# Patient Record
Sex: MALE | Race: WHITE | NOT HISPANIC OR LATINO
[De-identification: names, ages, dates, MRNs, and addresses within clinical notes are randomized per-mention and may not be internally consistent; named-entity substitution may affect disease eponyms.]

---

## 2020-01-01 ENCOUNTER — RESULT REVIEW (OUTPATIENT)
Age: 80
End: 2020-01-01

## 2020-01-01 ENCOUNTER — OUTPATIENT (OUTPATIENT)
Dept: OUTPATIENT SERVICES | Facility: HOSPITAL | Age: 80
LOS: 1 days | End: 2020-01-01
Payer: MEDICARE

## 2020-01-01 ENCOUNTER — TRANSCRIPTION ENCOUNTER (OUTPATIENT)
Age: 80
End: 2020-01-01

## 2020-01-01 ENCOUNTER — APPOINTMENT (OUTPATIENT)
Dept: MRI IMAGING | Facility: HOSPITAL | Age: 80
End: 2020-01-01
Payer: MEDICARE

## 2020-01-01 ENCOUNTER — INPATIENT (INPATIENT)
Facility: HOSPITAL | Age: 80
LOS: 12 days | DRG: 291 | End: 2020-11-06
Attending: INTERNAL MEDICINE | Admitting: INTERNAL MEDICINE
Payer: MEDICARE

## 2020-01-01 ENCOUNTER — APPOINTMENT (OUTPATIENT)
Dept: CT IMAGING | Facility: HOSPITAL | Age: 80
End: 2020-01-01
Payer: MEDICARE

## 2020-01-01 ENCOUNTER — INPATIENT (INPATIENT)
Facility: HOSPITAL | Age: 80
LOS: 4 days | Discharge: HOME | DRG: 186 | End: 2020-09-23
Attending: STUDENT IN AN ORGANIZED HEALTH CARE EDUCATION/TRAINING PROGRAM | Admitting: STUDENT IN AN ORGANIZED HEALTH CARE EDUCATION/TRAINING PROGRAM
Payer: MEDICARE

## 2020-01-01 VITALS
HEART RATE: 72 BPM | OXYGEN SATURATION: 94 % | HEIGHT: 72 IN | SYSTOLIC BLOOD PRESSURE: 135 MMHG | TEMPERATURE: 99 F | RESPIRATION RATE: 18 BRPM | WEIGHT: 139.99 LBS | DIASTOLIC BLOOD PRESSURE: 73 MMHG

## 2020-01-01 VITALS
DIASTOLIC BLOOD PRESSURE: 67 MMHG | RESPIRATION RATE: 20 BRPM | HEART RATE: 79 BPM | OXYGEN SATURATION: 89 % | SYSTOLIC BLOOD PRESSURE: 112 MMHG

## 2020-01-01 VITALS
SYSTOLIC BLOOD PRESSURE: 130 MMHG | DIASTOLIC BLOOD PRESSURE: 66 MMHG | TEMPERATURE: 98 F | OXYGEN SATURATION: 90 % | HEART RATE: 79 BPM

## 2020-01-01 VITALS
SYSTOLIC BLOOD PRESSURE: 96 MMHG | DIASTOLIC BLOOD PRESSURE: 46 MMHG | OXYGEN SATURATION: 57 % | RESPIRATION RATE: 26 BRPM | HEART RATE: 57 BPM

## 2020-01-01 DIAGNOSIS — R04.2 HEMOPTYSIS: ICD-10-CM

## 2020-01-01 DIAGNOSIS — Z00.8 ENCOUNTER FOR OTHER GENERAL EXAMINATION: ICD-10-CM

## 2020-01-01 DIAGNOSIS — R09.02 HYPOXEMIA: ICD-10-CM

## 2020-01-01 LAB
-  AMPICILLIN: SIGNIFICANT CHANGE UP
-  GENTAMICIN SYNERGY: SIGNIFICANT CHANGE UP
-  VANCOMYCIN: SIGNIFICANT CHANGE UP
A1C WITH ESTIMATED AVERAGE GLUCOSE RESULT: 5.4 % — SIGNIFICANT CHANGE UP (ref 4–5.6)
ABO RH CONFIRMATION: SIGNIFICANT CHANGE UP
ALBUMIN FLD-MCNC: 1.2 G/DL — SIGNIFICANT CHANGE UP
ALBUMIN FLD-MCNC: 1.3 G/DL — SIGNIFICANT CHANGE UP
ALBUMIN SERPL ELPH-MCNC: 1.4 G/DL — LOW (ref 3.3–5)
ALBUMIN SERPL ELPH-MCNC: 2 G/DL — LOW (ref 3.3–5)
ALBUMIN SERPL ELPH-MCNC: 2.9 G/DL — LOW (ref 3.3–5)
ALBUMIN SERPL ELPH-MCNC: 2.9 G/DL — LOW (ref 3.3–5)
ALP SERPL-CCNC: 100 U/L — SIGNIFICANT CHANGE UP (ref 40–120)
ALP SERPL-CCNC: 101 U/L — SIGNIFICANT CHANGE UP (ref 40–120)
ALP SERPL-CCNC: 103 U/L — SIGNIFICANT CHANGE UP (ref 40–120)
ALP SERPL-CCNC: 140 U/L — HIGH (ref 40–120)
ALP SERPL-CCNC: 94 U/L — SIGNIFICANT CHANGE UP (ref 40–120)
ALP SERPL-CCNC: 95 U/L — SIGNIFICANT CHANGE UP (ref 40–120)
ALT FLD-CCNC: 10 U/L — SIGNIFICANT CHANGE UP (ref 10–45)
ALT FLD-CCNC: 15 U/L — SIGNIFICANT CHANGE UP (ref 10–45)
ALT FLD-CCNC: 18 U/L — SIGNIFICANT CHANGE UP (ref 10–45)
ALT FLD-CCNC: 20 U/L — SIGNIFICANT CHANGE UP (ref 10–45)
ALT FLD-CCNC: 27 U/L — SIGNIFICANT CHANGE UP (ref 10–45)
ALT FLD-CCNC: 9 U/L — LOW (ref 10–45)
ANION GAP SERPL CALC-SCNC: 1 MMOL/L — LOW (ref 5–17)
ANION GAP SERPL CALC-SCNC: 10 MMOL/L — SIGNIFICANT CHANGE UP (ref 5–17)
ANION GAP SERPL CALC-SCNC: 11 MMOL/L — SIGNIFICANT CHANGE UP (ref 5–17)
ANION GAP SERPL CALC-SCNC: 12 MMOL/L — SIGNIFICANT CHANGE UP (ref 5–17)
ANION GAP SERPL CALC-SCNC: 4 MMOL/L — LOW (ref 5–17)
ANION GAP SERPL CALC-SCNC: 4 MMOL/L — LOW (ref 5–17)
ANION GAP SERPL CALC-SCNC: 5 MMOL/L — SIGNIFICANT CHANGE UP (ref 5–17)
ANION GAP SERPL CALC-SCNC: 5 MMOL/L — SIGNIFICANT CHANGE UP (ref 5–17)
ANION GAP SERPL CALC-SCNC: 6 MMOL/L — SIGNIFICANT CHANGE UP (ref 5–17)
ANION GAP SERPL CALC-SCNC: 7 MMOL/L — SIGNIFICANT CHANGE UP (ref 5–17)
ANION GAP SERPL CALC-SCNC: 8 MMOL/L — SIGNIFICANT CHANGE UP (ref 5–17)
ANION GAP SERPL CALC-SCNC: 9 MMOL/L — SIGNIFICANT CHANGE UP (ref 5–17)
APPEARANCE UR: ABNORMAL
APPEARANCE UR: CLEAR — SIGNIFICANT CHANGE UP
APTT BLD: 39.9 SEC — HIGH (ref 27.5–35.5)
APTT BLD: 48.7 SEC — HIGH (ref 27.5–35.5)
AST SERPL-CCNC: 11 U/L — SIGNIFICANT CHANGE UP (ref 10–40)
AST SERPL-CCNC: 13 U/L — SIGNIFICANT CHANGE UP (ref 10–40)
AST SERPL-CCNC: 24 U/L — SIGNIFICANT CHANGE UP (ref 10–40)
AST SERPL-CCNC: 28 U/L — SIGNIFICANT CHANGE UP (ref 10–40)
B PERT IGG+IGM PNL SER: ABNORMAL
B PERT IGG+IGM PNL SER: CLEAR — SIGNIFICANT CHANGE UP
BACTERIA # UR AUTO: ABNORMAL /HPF
BACTERIA # UR AUTO: ABNORMAL /HPF
BASOPHILS # BLD AUTO: 0.01 K/UL — SIGNIFICANT CHANGE UP (ref 0–0.2)
BASOPHILS # BLD AUTO: 0.02 K/UL — SIGNIFICANT CHANGE UP (ref 0–0.2)
BASOPHILS # BLD AUTO: 0.02 K/UL — SIGNIFICANT CHANGE UP (ref 0–0.2)
BASOPHILS # BLD AUTO: 0.03 K/UL — SIGNIFICANT CHANGE UP (ref 0–0.2)
BASOPHILS # BLD AUTO: 0.05 K/UL — SIGNIFICANT CHANGE UP (ref 0–0.2)
BASOPHILS NFR BLD AUTO: 0.1 % — SIGNIFICANT CHANGE UP (ref 0–2)
BASOPHILS NFR BLD AUTO: 0.2 % — SIGNIFICANT CHANGE UP (ref 0–2)
BASOPHILS NFR BLD AUTO: 0.2 % — SIGNIFICANT CHANGE UP (ref 0–2)
BASOPHILS NFR BLD AUTO: 0.4 % — SIGNIFICANT CHANGE UP (ref 0–2)
BASOPHILS NFR BLD AUTO: 0.5 % — SIGNIFICANT CHANGE UP (ref 0–2)
BILIRUB SERPL-MCNC: 0.2 MG/DL — SIGNIFICANT CHANGE UP (ref 0.2–1.2)
BILIRUB SERPL-MCNC: 0.2 MG/DL — SIGNIFICANT CHANGE UP (ref 0.2–1.2)
BILIRUB SERPL-MCNC: 0.3 MG/DL — SIGNIFICANT CHANGE UP (ref 0.2–1.2)
BILIRUB SERPL-MCNC: 0.4 MG/DL — SIGNIFICANT CHANGE UP (ref 0.2–1.2)
BILIRUB UR-MCNC: NEGATIVE — SIGNIFICANT CHANGE UP
BILIRUB UR-MCNC: NEGATIVE — SIGNIFICANT CHANGE UP
BLD GP AB SCN SERPL QL: SIGNIFICANT CHANGE UP
BLD GP AB SCN SERPL QL: SIGNIFICANT CHANGE UP
BUN SERPL-MCNC: 11 MG/DL — SIGNIFICANT CHANGE UP (ref 7–23)
BUN SERPL-MCNC: 12 MG/DL — SIGNIFICANT CHANGE UP (ref 7–23)
BUN SERPL-MCNC: 13 MG/DL — SIGNIFICANT CHANGE UP (ref 7–23)
BUN SERPL-MCNC: 13 MG/DL — SIGNIFICANT CHANGE UP (ref 7–23)
BUN SERPL-MCNC: 14 MG/DL — SIGNIFICANT CHANGE UP (ref 7–23)
BUN SERPL-MCNC: 14 MG/DL — SIGNIFICANT CHANGE UP (ref 7–23)
BUN SERPL-MCNC: 15 MG/DL — SIGNIFICANT CHANGE UP (ref 7–23)
BUN SERPL-MCNC: 17 MG/DL — SIGNIFICANT CHANGE UP (ref 7–23)
BUN SERPL-MCNC: 18 MG/DL — SIGNIFICANT CHANGE UP (ref 7–23)
BUN SERPL-MCNC: 19 MG/DL — SIGNIFICANT CHANGE UP (ref 7–23)
BUN SERPL-MCNC: 21 MG/DL — SIGNIFICANT CHANGE UP (ref 7–23)
BUN SERPL-MCNC: 22 MG/DL — SIGNIFICANT CHANGE UP (ref 7–23)
BUN SERPL-MCNC: 24 MG/DL — HIGH (ref 7–23)
BUN SERPL-MCNC: 24 MG/DL — HIGH (ref 7–23)
BUN SERPL-MCNC: 26 MG/DL — HIGH (ref 7–23)
CALCIUM SERPL-MCNC: 8.5 MG/DL — SIGNIFICANT CHANGE UP (ref 8.4–10.5)
CALCIUM SERPL-MCNC: 8.6 MG/DL — SIGNIFICANT CHANGE UP (ref 8.4–10.5)
CALCIUM SERPL-MCNC: 8.6 MG/DL — SIGNIFICANT CHANGE UP (ref 8.4–10.5)
CALCIUM SERPL-MCNC: 8.7 MG/DL — SIGNIFICANT CHANGE UP (ref 8.4–10.5)
CALCIUM SERPL-MCNC: 8.8 MG/DL — SIGNIFICANT CHANGE UP (ref 8.4–10.5)
CALCIUM SERPL-MCNC: 8.9 MG/DL — SIGNIFICANT CHANGE UP (ref 8.4–10.5)
CALCIUM SERPL-MCNC: 9 MG/DL — SIGNIFICANT CHANGE UP (ref 8.4–10.5)
CALCIUM SERPL-MCNC: 9.1 MG/DL — SIGNIFICANT CHANGE UP (ref 8.4–10.5)
CHLORIDE SERPL-SCNC: 100 MMOL/L — SIGNIFICANT CHANGE UP (ref 96–108)
CHLORIDE SERPL-SCNC: 100 MMOL/L — SIGNIFICANT CHANGE UP (ref 96–108)
CHLORIDE SERPL-SCNC: 101 MMOL/L — SIGNIFICANT CHANGE UP (ref 96–108)
CHLORIDE SERPL-SCNC: 101 MMOL/L — SIGNIFICANT CHANGE UP (ref 96–108)
CHLORIDE SERPL-SCNC: 102 MMOL/L — SIGNIFICANT CHANGE UP (ref 96–108)
CHLORIDE SERPL-SCNC: 104 MMOL/L — SIGNIFICANT CHANGE UP (ref 96–108)
CHLORIDE SERPL-SCNC: 104 MMOL/L — SIGNIFICANT CHANGE UP (ref 96–108)
CHLORIDE SERPL-SCNC: 105 MMOL/L — SIGNIFICANT CHANGE UP (ref 96–108)
CHLORIDE SERPL-SCNC: 106 MMOL/L — SIGNIFICANT CHANGE UP (ref 96–108)
CHLORIDE SERPL-SCNC: 109 MMOL/L — HIGH (ref 96–108)
CHLORIDE SERPL-SCNC: 90 MMOL/L — LOW (ref 96–108)
CHLORIDE SERPL-SCNC: 94 MMOL/L — LOW (ref 96–108)
CHLORIDE SERPL-SCNC: 95 MMOL/L — LOW (ref 96–108)
CHLORIDE SERPL-SCNC: 96 MMOL/L — SIGNIFICANT CHANGE UP (ref 96–108)
CHLORIDE SERPL-SCNC: 96 MMOL/L — SIGNIFICANT CHANGE UP (ref 96–108)
CHLORIDE SERPL-SCNC: 97 MMOL/L — SIGNIFICANT CHANGE UP (ref 96–108)
CHLORIDE SERPL-SCNC: 98 MMOL/L — SIGNIFICANT CHANGE UP (ref 96–108)
CK MB BLD-MCNC: 8.9 % — HIGH (ref 0–3.5)
CK MB CFR SERPL CALC: 1.6 NG/ML — SIGNIFICANT CHANGE UP (ref 0.5–10)
CK SERPL-CCNC: 18 U/L — LOW (ref 30–200)
CO2 BLDA-SCNC: 24 MMOL/L — SIGNIFICANT CHANGE UP (ref 22–30)
CO2 BLDA-SCNC: 26 MMOL/L — SIGNIFICANT CHANGE UP (ref 22–30)
CO2 BLDA-SCNC: 30 MMOL/L — SIGNIFICANT CHANGE UP (ref 22–30)
CO2 BLDA-SCNC: 32 MMOL/L — HIGH (ref 22–30)
CO2 SERPL-SCNC: 25 MMOL/L — SIGNIFICANT CHANGE UP (ref 22–31)
CO2 SERPL-SCNC: 26 MMOL/L — SIGNIFICANT CHANGE UP (ref 22–31)
CO2 SERPL-SCNC: 27 MMOL/L — SIGNIFICANT CHANGE UP (ref 22–31)
CO2 SERPL-SCNC: 27 MMOL/L — SIGNIFICANT CHANGE UP (ref 22–31)
CO2 SERPL-SCNC: 28 MMOL/L — SIGNIFICANT CHANGE UP (ref 22–31)
CO2 SERPL-SCNC: 29 MMOL/L — SIGNIFICANT CHANGE UP (ref 22–31)
CO2 SERPL-SCNC: 29 MMOL/L — SIGNIFICANT CHANGE UP (ref 22–31)
CO2 SERPL-SCNC: 30 MMOL/L — SIGNIFICANT CHANGE UP (ref 22–31)
CO2 SERPL-SCNC: 31 MMOL/L — SIGNIFICANT CHANGE UP (ref 22–31)
CO2 SERPL-SCNC: 31 MMOL/L — SIGNIFICANT CHANGE UP (ref 22–31)
CO2 SERPL-SCNC: 32 MMOL/L — HIGH (ref 22–31)
CO2 SERPL-SCNC: 32 MMOL/L — HIGH (ref 22–31)
CO2 SERPL-SCNC: 33 MMOL/L — HIGH (ref 22–31)
CO2 SERPL-SCNC: 33 MMOL/L — HIGH (ref 22–31)
CO2 SERPL-SCNC: 34 MMOL/L — HIGH (ref 22–31)
COLOR FLD: YELLOW — SIGNIFICANT CHANGE UP
COLOR FLD: YELLOW — SIGNIFICANT CHANGE UP
COLOR SPEC: YELLOW — SIGNIFICANT CHANGE UP
COLOR SPEC: YELLOW — SIGNIFICANT CHANGE UP
COMMENT - URINE: SIGNIFICANT CHANGE UP
CREAT SERPL-MCNC: 1.1 MG/DL — SIGNIFICANT CHANGE UP (ref 0.5–1.3)
CREAT SERPL-MCNC: 1.18 MG/DL — SIGNIFICANT CHANGE UP (ref 0.5–1.3)
CREAT SERPL-MCNC: 1.19 MG/DL — SIGNIFICANT CHANGE UP (ref 0.5–1.3)
CREAT SERPL-MCNC: 1.2 MG/DL — SIGNIFICANT CHANGE UP (ref 0.5–1.3)
CREAT SERPL-MCNC: 1.21 MG/DL — SIGNIFICANT CHANGE UP (ref 0.5–1.3)
CREAT SERPL-MCNC: 1.24 MG/DL — SIGNIFICANT CHANGE UP (ref 0.5–1.3)
CREAT SERPL-MCNC: 1.24 MG/DL — SIGNIFICANT CHANGE UP (ref 0.5–1.3)
CREAT SERPL-MCNC: 1.25 MG/DL — SIGNIFICANT CHANGE UP (ref 0.5–1.3)
CREAT SERPL-MCNC: 1.26 MG/DL — SIGNIFICANT CHANGE UP (ref 0.5–1.3)
CREAT SERPL-MCNC: 1.26 MG/DL — SIGNIFICANT CHANGE UP (ref 0.5–1.3)
CREAT SERPL-MCNC: 1.27 MG/DL — SIGNIFICANT CHANGE UP (ref 0.5–1.3)
CREAT SERPL-MCNC: 1.28 MG/DL — SIGNIFICANT CHANGE UP (ref 0.5–1.3)
CREAT SERPL-MCNC: 1.3 MG/DL — SIGNIFICANT CHANGE UP (ref 0.5–1.3)
CREAT SERPL-MCNC: 1.34 MG/DL — HIGH (ref 0.5–1.3)
CREAT SERPL-MCNC: 1.35 MG/DL — HIGH (ref 0.5–1.3)
CREAT SERPL-MCNC: 1.39 MG/DL — HIGH (ref 0.5–1.3)
CREAT SERPL-MCNC: 1.41 MG/DL — HIGH (ref 0.5–1.3)
CREAT SERPL-MCNC: 1.42 MG/DL — HIGH (ref 0.5–1.3)
CREAT SERPL-MCNC: 1.42 MG/DL — HIGH (ref 0.5–1.3)
CREAT SERPL-MCNC: 1.46 MG/DL — HIGH (ref 0.5–1.3)
CREAT SERPL-MCNC: 1.5 MG/DL — HIGH (ref 0.5–1.3)
CREAT SERPL-MCNC: 1.61 MG/DL — HIGH (ref 0.5–1.3)
CULTURE RESULTS: NO GROWTH — SIGNIFICANT CHANGE UP
CULTURE RESULTS: NO GROWTH — SIGNIFICANT CHANGE UP
CULTURE RESULTS: SIGNIFICANT CHANGE UP
D DIMER BLD IA.RAPID-MCNC: 3496 NG/ML DDU — HIGH
DIFF PNL FLD: ABNORMAL
DIFF PNL FLD: ABNORMAL
ENTEROCOC DNA BLD POS QL NAA+NON-PROBE: SIGNIFICANT CHANGE UP
ENTEROCOC DNA BLD POS QL NAA+NON-PROBE: SIGNIFICANT CHANGE UP
EOSINOPHIL # BLD AUTO: 0.06 K/UL — SIGNIFICANT CHANGE UP (ref 0–0.5)
EOSINOPHIL # BLD AUTO: 0.09 K/UL — SIGNIFICANT CHANGE UP (ref 0–0.5)
EOSINOPHIL # BLD AUTO: 0.11 K/UL — SIGNIFICANT CHANGE UP (ref 0–0.5)
EOSINOPHIL # BLD AUTO: 0.14 K/UL — SIGNIFICANT CHANGE UP (ref 0–0.5)
EOSINOPHIL # BLD AUTO: 0.34 K/UL — SIGNIFICANT CHANGE UP (ref 0–0.5)
EOSINOPHIL # FLD: 0 % — SIGNIFICANT CHANGE UP
EOSINOPHIL NFR BLD AUTO: 0.6 % — SIGNIFICANT CHANGE UP (ref 0–6)
EOSINOPHIL NFR BLD AUTO: 0.9 % — SIGNIFICANT CHANGE UP (ref 0–6)
EOSINOPHIL NFR BLD AUTO: 1.2 % — SIGNIFICANT CHANGE UP (ref 0–6)
EOSINOPHIL NFR BLD AUTO: 1.4 % — SIGNIFICANT CHANGE UP (ref 0–6)
EOSINOPHIL NFR BLD AUTO: 4.1 % — SIGNIFICANT CHANGE UP (ref 0–6)
EPI CELLS # UR: SIGNIFICANT CHANGE UP
EPI CELLS # UR: SIGNIFICANT CHANGE UP
ESTIMATED AVERAGE GLUCOSE: 108 MG/DL — SIGNIFICANT CHANGE UP (ref 68–114)
FLUID INTAKE SUBSTANCE CLASS: SIGNIFICANT CHANGE UP
FLUID INTAKE SUBSTANCE CLASS: SIGNIFICANT CHANGE UP
FLUID SEGMENTED GRANULOCYTES: 20 % — SIGNIFICANT CHANGE UP
FLUID SEGMENTED GRANULOCYTES: 5 % — SIGNIFICANT CHANGE UP
FOLATE+VIT B12 SERBLD-IMP: 0 % — SIGNIFICANT CHANGE UP
GAS PNL BLDA: SIGNIFICANT CHANGE UP
GLUCOSE BLDC GLUCOMTR-MCNC: 148 MG/DL — HIGH (ref 70–99)
GLUCOSE BLDC GLUCOMTR-MCNC: 50 MG/DL — CRITICAL LOW (ref 70–99)
GLUCOSE FLD-MCNC: 102 MG/DL — SIGNIFICANT CHANGE UP
GLUCOSE FLD-MCNC: 107 MG/DL — SIGNIFICANT CHANGE UP
GLUCOSE SERPL-MCNC: 108 MG/DL — HIGH (ref 70–99)
GLUCOSE SERPL-MCNC: 111 MG/DL — HIGH (ref 70–99)
GLUCOSE SERPL-MCNC: 113 MG/DL — HIGH (ref 70–99)
GLUCOSE SERPL-MCNC: 114 MG/DL — HIGH (ref 70–99)
GLUCOSE SERPL-MCNC: 122 MG/DL — HIGH (ref 70–99)
GLUCOSE SERPL-MCNC: 124 MG/DL — HIGH (ref 70–99)
GLUCOSE SERPL-MCNC: 127 MG/DL — HIGH (ref 70–99)
GLUCOSE SERPL-MCNC: 153 MG/DL — HIGH (ref 70–99)
GLUCOSE SERPL-MCNC: 43 MG/DL — CRITICAL LOW (ref 70–99)
GLUCOSE SERPL-MCNC: 71 MG/DL — SIGNIFICANT CHANGE UP (ref 70–99)
GLUCOSE SERPL-MCNC: 73 MG/DL — SIGNIFICANT CHANGE UP (ref 70–99)
GLUCOSE SERPL-MCNC: 78 MG/DL — SIGNIFICANT CHANGE UP (ref 70–99)
GLUCOSE SERPL-MCNC: 84 MG/DL — SIGNIFICANT CHANGE UP (ref 70–99)
GLUCOSE SERPL-MCNC: 85 MG/DL — SIGNIFICANT CHANGE UP (ref 70–99)
GLUCOSE SERPL-MCNC: 86 MG/DL — SIGNIFICANT CHANGE UP (ref 70–99)
GLUCOSE SERPL-MCNC: 87 MG/DL — SIGNIFICANT CHANGE UP (ref 70–99)
GLUCOSE SERPL-MCNC: 87 MG/DL — SIGNIFICANT CHANGE UP (ref 70–99)
GLUCOSE SERPL-MCNC: 92 MG/DL — SIGNIFICANT CHANGE UP (ref 70–99)
GLUCOSE SERPL-MCNC: 92 MG/DL — SIGNIFICANT CHANGE UP (ref 70–99)
GLUCOSE SERPL-MCNC: 95 MG/DL — SIGNIFICANT CHANGE UP (ref 70–99)
GLUCOSE SERPL-MCNC: 96 MG/DL — SIGNIFICANT CHANGE UP (ref 70–99)
GLUCOSE SERPL-MCNC: 97 MG/DL — SIGNIFICANT CHANGE UP (ref 70–99)
GLUCOSE UR QL: NEGATIVE — SIGNIFICANT CHANGE UP
GLUCOSE UR QL: NEGATIVE — SIGNIFICANT CHANGE UP
GRAM STN FLD: SIGNIFICANT CHANGE UP
HCT VFR BLD CALC: 20.1 % — CRITICAL LOW (ref 39–50)
HCT VFR BLD CALC: 22.4 % — LOW (ref 39–50)
HCT VFR BLD CALC: 24.3 % — LOW (ref 39–50)
HCT VFR BLD CALC: 24.6 % — LOW (ref 39–50)
HCT VFR BLD CALC: 24.7 % — LOW (ref 39–50)
HCT VFR BLD CALC: 24.9 % — LOW (ref 39–50)
HCT VFR BLD CALC: 25.2 % — LOW (ref 39–50)
HCT VFR BLD CALC: 25.4 % — LOW (ref 39–50)
HCT VFR BLD CALC: 25.5 % — LOW (ref 39–50)
HCT VFR BLD CALC: 25.9 % — LOW (ref 39–50)
HCT VFR BLD CALC: 26.2 % — LOW (ref 39–50)
HCT VFR BLD CALC: 26.7 % — LOW (ref 39–50)
HCT VFR BLD CALC: 27 % — LOW (ref 39–50)
HCT VFR BLD CALC: 27.1 % — LOW (ref 39–50)
HCT VFR BLD CALC: 27.7 % — LOW (ref 39–50)
HCT VFR BLD CALC: 27.8 % — LOW (ref 39–50)
HCT VFR BLD CALC: 28.3 % — LOW (ref 39–50)
HCT VFR BLD CALC: 28.3 % — LOW (ref 39–50)
HCT VFR BLD CALC: 28.6 % — LOW (ref 39–50)
HGB BLD-MCNC: 6.3 G/DL — CRITICAL LOW (ref 13–17)
HGB BLD-MCNC: 6.8 G/DL — CRITICAL LOW (ref 13–17)
HGB BLD-MCNC: 7.3 G/DL — LOW (ref 13–17)
HGB BLD-MCNC: 7.4 G/DL — LOW (ref 13–17)
HGB BLD-MCNC: 7.8 G/DL — LOW (ref 13–17)
HGB BLD-MCNC: 8 G/DL — LOW (ref 13–17)
HGB BLD-MCNC: 8.1 G/DL — LOW (ref 13–17)
HGB BLD-MCNC: 8.2 G/DL — LOW (ref 13–17)
HGB BLD-MCNC: 8.3 G/DL — LOW (ref 13–17)
HGB BLD-MCNC: 8.3 G/DL — LOW (ref 13–17)
HGB BLD-MCNC: 8.5 G/DL — LOW (ref 13–17)
HGB BLD-MCNC: 8.8 G/DL — LOW (ref 13–17)
HGB BLD-MCNC: 8.9 G/DL — LOW (ref 13–17)
HGB BLD-MCNC: 9 G/DL — LOW (ref 13–17)
HGB BLD-MCNC: 9.1 G/DL — LOW (ref 13–17)
HGB BLD-MCNC: 9.1 G/DL — LOW (ref 13–17)
HOROWITZ INDEX BLDA+IHG-RTO: SIGNIFICANT CHANGE UP
IMM GRANULOCYTES NFR BLD AUTO: 0.8 % — SIGNIFICANT CHANGE UP (ref 0–1.5)
IMM GRANULOCYTES NFR BLD AUTO: 0.9 % — SIGNIFICANT CHANGE UP (ref 0–1.5)
IMM GRANULOCYTES NFR BLD AUTO: 1.2 % — SIGNIFICANT CHANGE UP (ref 0–1.5)
INR BLD: 1.1 RATIO — SIGNIFICANT CHANGE UP (ref 0.88–1.16)
INR BLD: 1.16 RATIO — SIGNIFICANT CHANGE UP (ref 0.88–1.16)
INR BLD: 1.17 RATIO — HIGH (ref 0.88–1.16)
KETONES UR-MCNC: NEGATIVE — SIGNIFICANT CHANGE UP
KETONES UR-MCNC: NEGATIVE — SIGNIFICANT CHANGE UP
LACTATE SERPL-SCNC: 0.8 MMOL/L — SIGNIFICANT CHANGE UP (ref 0.7–2)
LDH SERPL L TO P-CCNC: 202 U/L — SIGNIFICANT CHANGE UP (ref 50–242)
LDH SERPL L TO P-CCNC: 217 U/L — SIGNIFICANT CHANGE UP (ref 50–242)
LDH SERPL L TO P-CCNC: 75 U/L — SIGNIFICANT CHANGE UP
LDH SERPL L TO P-CCNC: 80 U/L — SIGNIFICANT CHANGE UP
LEGIONELLA AG UR QL: NEGATIVE — SIGNIFICANT CHANGE UP
LEUKOCYTE ESTERASE UR-ACNC: ABNORMAL
LEUKOCYTE ESTERASE UR-ACNC: ABNORMAL
LYMPHOCYTES # BLD AUTO: 0.38 K/UL — LOW (ref 1–3.3)
LYMPHOCYTES # BLD AUTO: 0.41 K/UL — LOW (ref 1–3.3)
LYMPHOCYTES # BLD AUTO: 0.45 K/UL — LOW (ref 1–3.3)
LYMPHOCYTES # BLD AUTO: 0.46 K/UL — LOW (ref 1–3.3)
LYMPHOCYTES # BLD AUTO: 0.51 K/UL — LOW (ref 1–3.3)
LYMPHOCYTES # BLD AUTO: 3.4 % — LOW (ref 13–44)
LYMPHOCYTES # BLD AUTO: 3.9 % — LOW (ref 13–44)
LYMPHOCYTES # BLD AUTO: 4.6 % — LOW (ref 13–44)
LYMPHOCYTES # BLD AUTO: 6 % — LOW (ref 13–44)
LYMPHOCYTES # BLD AUTO: 6.2 % — LOW (ref 13–44)
LYMPHOCYTES # FLD: 15 % — SIGNIFICANT CHANGE UP
LYMPHOCYTES # FLD: 25 % — SIGNIFICANT CHANGE UP
MAGNESIUM SERPL-MCNC: 1.8 MG/DL — SIGNIFICANT CHANGE UP (ref 1.6–2.6)
MAGNESIUM SERPL-MCNC: 1.9 MG/DL — SIGNIFICANT CHANGE UP (ref 1.6–2.6)
MAGNESIUM SERPL-MCNC: 2.1 MG/DL — SIGNIFICANT CHANGE UP (ref 1.6–2.6)
MAGNESIUM SERPL-MCNC: 2.2 MG/DL — SIGNIFICANT CHANGE UP (ref 1.6–2.6)
MAGNESIUM SERPL-MCNC: 2.3 MG/DL — SIGNIFICANT CHANGE UP (ref 1.6–2.6)
MAGNESIUM SERPL-MCNC: 2.4 MG/DL — SIGNIFICANT CHANGE UP (ref 1.6–2.6)
MCHC RBC-ENTMCNC: 23.5 PG — LOW (ref 27–34)
MCHC RBC-ENTMCNC: 23.8 PG — LOW (ref 27–34)
MCHC RBC-ENTMCNC: 24 PG — LOW (ref 27–34)
MCHC RBC-ENTMCNC: 24.1 PG — LOW (ref 27–34)
MCHC RBC-ENTMCNC: 24.2 PG — LOW (ref 27–34)
MCHC RBC-ENTMCNC: 24.4 PG — LOW (ref 27–34)
MCHC RBC-ENTMCNC: 24.5 PG — LOW (ref 27–34)
MCHC RBC-ENTMCNC: 24.6 PG — LOW (ref 27–34)
MCHC RBC-ENTMCNC: 24.7 PG — LOW (ref 27–34)
MCHC RBC-ENTMCNC: 24.8 PG — LOW (ref 27–34)
MCHC RBC-ENTMCNC: 24.9 PG — LOW (ref 27–34)
MCHC RBC-ENTMCNC: 25.2 PG — LOW (ref 27–34)
MCHC RBC-ENTMCNC: 25.4 PG — LOW (ref 27–34)
MCHC RBC-ENTMCNC: 27.9 PG — SIGNIFICANT CHANGE UP (ref 27–34)
MCHC RBC-ENTMCNC: 28.2 PG — SIGNIFICANT CHANGE UP (ref 27–34)
MCHC RBC-ENTMCNC: 28.4 PG — SIGNIFICANT CHANGE UP (ref 27–34)
MCHC RBC-ENTMCNC: 28.6 PG — SIGNIFICANT CHANGE UP (ref 27–34)
MCHC RBC-ENTMCNC: 28.8 PG — SIGNIFICANT CHANGE UP (ref 27–34)
MCHC RBC-ENTMCNC: 28.9 PG — SIGNIFICANT CHANGE UP (ref 27–34)
MCHC RBC-ENTMCNC: 28.9 PG — SIGNIFICANT CHANGE UP (ref 27–34)
MCHC RBC-ENTMCNC: 29.6 GM/DL — LOW (ref 32–36)
MCHC RBC-ENTMCNC: 29.6 GM/DL — LOW (ref 32–36)
MCHC RBC-ENTMCNC: 30 GM/DL — LOW (ref 32–36)
MCHC RBC-ENTMCNC: 30.4 GM/DL — LOW (ref 32–36)
MCHC RBC-ENTMCNC: 30.5 GM/DL — LOW (ref 32–36)
MCHC RBC-ENTMCNC: 30.5 GM/DL — LOW (ref 32–36)
MCHC RBC-ENTMCNC: 30.7 GM/DL — LOW (ref 32–36)
MCHC RBC-ENTMCNC: 30.7 GM/DL — LOW (ref 32–36)
MCHC RBC-ENTMCNC: 30.8 GM/DL — LOW (ref 32–36)
MCHC RBC-ENTMCNC: 31 GM/DL — LOW (ref 32–36)
MCHC RBC-ENTMCNC: 31.1 GM/DL — LOW (ref 32–36)
MCHC RBC-ENTMCNC: 31.3 GM/DL — LOW (ref 32–36)
MCHC RBC-ENTMCNC: 31.3 GM/DL — LOW (ref 32–36)
MCHC RBC-ENTMCNC: 31.7 GM/DL — LOW (ref 32–36)
MCHC RBC-ENTMCNC: 31.8 GM/DL — LOW (ref 32–36)
MCHC RBC-ENTMCNC: 32.2 GM/DL — SIGNIFICANT CHANGE UP (ref 32–36)
MCHC RBC-ENTMCNC: 32.3 GM/DL — SIGNIFICANT CHANGE UP (ref 32–36)
MCHC RBC-ENTMCNC: 32.4 GM/DL — SIGNIFICANT CHANGE UP (ref 32–36)
MCHC RBC-ENTMCNC: 32.5 GM/DL — SIGNIFICANT CHANGE UP (ref 32–36)
MCHC RBC-ENTMCNC: 32.8 GM/DL — SIGNIFICANT CHANGE UP (ref 32–36)
MCHC RBC-ENTMCNC: 32.9 GM/DL — SIGNIFICANT CHANGE UP (ref 32–36)
MCHC RBC-ENTMCNC: 33 GM/DL — SIGNIFICANT CHANGE UP (ref 32–36)
MCV RBC AUTO: 76.6 FL — LOW (ref 80–100)
MCV RBC AUTO: 77.3 FL — LOW (ref 80–100)
MCV RBC AUTO: 78.3 FL — LOW (ref 80–100)
MCV RBC AUTO: 78.4 FL — LOW (ref 80–100)
MCV RBC AUTO: 78.6 FL — LOW (ref 80–100)
MCV RBC AUTO: 79.2 FL — LOW (ref 80–100)
MCV RBC AUTO: 79.4 FL — LOW (ref 80–100)
MCV RBC AUTO: 79.6 FL — LOW (ref 80–100)
MCV RBC AUTO: 80.1 FL — SIGNIFICANT CHANGE UP (ref 80–100)
MCV RBC AUTO: 80.2 FL — SIGNIFICANT CHANGE UP (ref 80–100)
MCV RBC AUTO: 80.4 FL — SIGNIFICANT CHANGE UP (ref 80–100)
MCV RBC AUTO: 80.5 FL — SIGNIFICANT CHANGE UP (ref 80–100)
MCV RBC AUTO: 80.6 FL — SIGNIFICANT CHANGE UP (ref 80–100)
MCV RBC AUTO: 81.3 FL — SIGNIFICANT CHANGE UP (ref 80–100)
MCV RBC AUTO: 82.2 FL — SIGNIFICANT CHANGE UP (ref 80–100)
MCV RBC AUTO: 86.9 FL — SIGNIFICANT CHANGE UP (ref 80–100)
MCV RBC AUTO: 87.4 FL — SIGNIFICANT CHANGE UP (ref 80–100)
MCV RBC AUTO: 87.7 FL — SIGNIFICANT CHANGE UP (ref 80–100)
MCV RBC AUTO: 87.9 FL — SIGNIFICANT CHANGE UP (ref 80–100)
MCV RBC AUTO: 87.9 FL — SIGNIFICANT CHANGE UP (ref 80–100)
MCV RBC AUTO: 88.1 FL — SIGNIFICANT CHANGE UP (ref 80–100)
MCV RBC AUTO: 89 FL — SIGNIFICANT CHANGE UP (ref 80–100)
MESOTHL CELL # FLD: 0 % — SIGNIFICANT CHANGE UP
METHOD TYPE: SIGNIFICANT CHANGE UP
MONOCYTES # BLD AUTO: 0.29 K/UL — SIGNIFICANT CHANGE UP (ref 0–0.9)
MONOCYTES # BLD AUTO: 0.35 K/UL — SIGNIFICANT CHANGE UP (ref 0–0.9)
MONOCYTES # BLD AUTO: 0.42 K/UL — SIGNIFICANT CHANGE UP (ref 0–0.9)
MONOCYTES # BLD AUTO: 0.61 K/UL — SIGNIFICANT CHANGE UP (ref 0–0.9)
MONOCYTES # BLD AUTO: 0.67 K/UL — SIGNIFICANT CHANGE UP (ref 0–0.9)
MONOCYTES NFR BLD AUTO: 3 % — SIGNIFICANT CHANGE UP (ref 2–14)
MONOCYTES NFR BLD AUTO: 4.3 % — SIGNIFICANT CHANGE UP (ref 2–14)
MONOCYTES NFR BLD AUTO: 5 % — SIGNIFICANT CHANGE UP (ref 2–14)
MONOCYTES NFR BLD AUTO: 5.5 % — SIGNIFICANT CHANGE UP (ref 2–14)
MONOCYTES NFR BLD AUTO: 6.8 % — SIGNIFICANT CHANGE UP (ref 2–14)
MONOS+MACROS # FLD: 65 % — SIGNIFICANT CHANGE UP
MONOS+MACROS # FLD: 70 % — SIGNIFICANT CHANGE UP
NEUTROPHILS # BLD AUTO: 10.83 K/UL — HIGH (ref 1.8–7.4)
NEUTROPHILS # BLD AUTO: 6.58 K/UL — SIGNIFICANT CHANGE UP (ref 1.8–7.4)
NEUTROPHILS # BLD AUTO: 6.93 K/UL — SIGNIFICANT CHANGE UP (ref 1.8–7.4)
NEUTROPHILS # BLD AUTO: 8.49 K/UL — HIGH (ref 1.8–7.4)
NEUTROPHILS # BLD AUTO: 8.81 K/UL — HIGH (ref 1.8–7.4)
NEUTROPHILS NFR BLD AUTO: 84.3 % — HIGH (ref 43–77)
NEUTROPHILS NFR BLD AUTO: 85.8 % — HIGH (ref 43–77)
NEUTROPHILS NFR BLD AUTO: 86.7 % — HIGH (ref 43–77)
NEUTROPHILS NFR BLD AUTO: 89.3 % — HIGH (ref 43–77)
NEUTROPHILS NFR BLD AUTO: 90.9 % — HIGH (ref 43–77)
NITRITE UR-MCNC: NEGATIVE — SIGNIFICANT CHANGE UP
NITRITE UR-MCNC: NEGATIVE — SIGNIFICANT CHANGE UP
NON-GYNECOLOGICAL CYTOLOGY STUDY: SIGNIFICANT CHANGE UP
NON-GYNECOLOGICAL CYTOLOGY STUDY: SIGNIFICANT CHANGE UP
NRBC # BLD: 0 /100 WBCS — SIGNIFICANT CHANGE UP (ref 0–0)
NRBC # FLD: 0 — SIGNIFICANT CHANGE UP
NT-PROBNP SERPL-SCNC: 1113 PG/ML — HIGH (ref 0–300)
NT-PROBNP SERPL-SCNC: 3345 PG/ML — HIGH (ref 0–300)
OB PNL STL: POSITIVE
ORGANISM # SPEC MICROSCOPIC CNT: SIGNIFICANT CHANGE UP
OTHER CELLS FLD MANUAL: 0 % — SIGNIFICANT CHANGE UP
PCO2 BLDA: 37 MMHG — SIGNIFICANT CHANGE UP (ref 32–46)
PCO2 BLDA: 39 MMHG — SIGNIFICANT CHANGE UP (ref 32–46)
PCO2 BLDA: 42 MMHG — SIGNIFICANT CHANGE UP (ref 32–46)
PCO2 BLDA: 47 MMHG — HIGH (ref 32–46)
PH BLDA: 7.41 — SIGNIFICANT CHANGE UP (ref 7.35–7.45)
PH BLDA: 7.41 — SIGNIFICANT CHANGE UP (ref 7.35–7.45)
PH BLDA: 7.42 — SIGNIFICANT CHANGE UP (ref 7.35–7.45)
PH BLDA: 7.45 — SIGNIFICANT CHANGE UP (ref 7.35–7.45)
PH FLD: 7.7 — SIGNIFICANT CHANGE UP
PH FLD: 7.8 — SIGNIFICANT CHANGE UP
PH UR: 6 — SIGNIFICANT CHANGE UP (ref 5–8)
PH UR: 6.5 — SIGNIFICANT CHANGE UP (ref 5–8)
PHOSPHATE SERPL-MCNC: 2.1 MG/DL — LOW (ref 2.5–4.5)
PHOSPHATE SERPL-MCNC: 2.6 MG/DL — SIGNIFICANT CHANGE UP (ref 2.5–4.5)
PHOSPHATE SERPL-MCNC: 2.8 MG/DL — SIGNIFICANT CHANGE UP (ref 2.5–4.5)
PHOSPHATE SERPL-MCNC: 2.9 MG/DL — SIGNIFICANT CHANGE UP (ref 2.5–4.5)
PHOSPHATE SERPL-MCNC: 3.1 MG/DL — SIGNIFICANT CHANGE UP (ref 2.5–4.5)
PHOSPHATE SERPL-MCNC: 3.1 MG/DL — SIGNIFICANT CHANGE UP (ref 2.5–4.5)
PHOSPHATE SERPL-MCNC: 3.2 MG/DL — SIGNIFICANT CHANGE UP (ref 2.5–4.5)
PHOSPHATE SERPL-MCNC: 3.3 MG/DL — SIGNIFICANT CHANGE UP (ref 2.5–4.5)
PHOSPHATE SERPL-MCNC: 3.3 MG/DL — SIGNIFICANT CHANGE UP (ref 2.5–4.5)
PHOSPHATE SERPL-MCNC: 3.7 MG/DL — SIGNIFICANT CHANGE UP (ref 2.5–4.5)
PHOSPHATE SERPL-MCNC: 4.2 MG/DL — SIGNIFICANT CHANGE UP (ref 2.5–4.5)
PLATELET # BLD AUTO: 176 K/UL — SIGNIFICANT CHANGE UP (ref 150–400)
PLATELET # BLD AUTO: 176 K/UL — SIGNIFICANT CHANGE UP (ref 150–400)
PLATELET # BLD AUTO: 186 K/UL — SIGNIFICANT CHANGE UP (ref 150–400)
PLATELET # BLD AUTO: 199 K/UL — SIGNIFICANT CHANGE UP (ref 150–400)
PLATELET # BLD AUTO: 215 K/UL — SIGNIFICANT CHANGE UP (ref 150–400)
PLATELET # BLD AUTO: 219 K/UL — SIGNIFICANT CHANGE UP (ref 150–400)
PLATELET # BLD AUTO: 230 K/UL — SIGNIFICANT CHANGE UP (ref 150–400)
PLATELET # BLD AUTO: 243 K/UL — SIGNIFICANT CHANGE UP (ref 150–400)
PLATELET # BLD AUTO: 247 K/UL — SIGNIFICANT CHANGE UP (ref 150–400)
PLATELET # BLD AUTO: 250 K/UL — SIGNIFICANT CHANGE UP (ref 150–400)
PLATELET # BLD AUTO: 253 K/UL — SIGNIFICANT CHANGE UP (ref 150–400)
PLATELET # BLD AUTO: 267 K/UL — SIGNIFICANT CHANGE UP (ref 150–400)
PLATELET # BLD AUTO: 281 K/UL — SIGNIFICANT CHANGE UP (ref 150–400)
PLATELET # BLD AUTO: 290 K/UL — SIGNIFICANT CHANGE UP (ref 150–400)
PLATELET # BLD AUTO: 293 K/UL — SIGNIFICANT CHANGE UP (ref 150–400)
PLATELET # BLD AUTO: 298 K/UL — SIGNIFICANT CHANGE UP (ref 150–400)
PLATELET # BLD AUTO: 301 K/UL — SIGNIFICANT CHANGE UP (ref 150–400)
PLATELET # BLD AUTO: 303 K/UL — SIGNIFICANT CHANGE UP (ref 150–400)
PLATELET # BLD AUTO: 305 K/UL — SIGNIFICANT CHANGE UP (ref 150–400)
PLATELET # BLD AUTO: 327 K/UL — SIGNIFICANT CHANGE UP (ref 150–400)
PLATELET # BLD AUTO: 328 K/UL — SIGNIFICANT CHANGE UP (ref 150–400)
PLATELET # BLD AUTO: 342 K/UL — SIGNIFICANT CHANGE UP (ref 150–400)
PO2 BLDA: 199 MMHG — HIGH (ref 74–108)
PO2 BLDA: 56 MMHG — LOW (ref 74–108)
PO2 BLDA: 62 MMHG — LOW (ref 74–108)
PO2 BLDA: 79 MMHG — SIGNIFICANT CHANGE UP (ref 74–108)
POTASSIUM SERPL-MCNC: 2.8 MMOL/L — CRITICAL LOW (ref 3.5–5.3)
POTASSIUM SERPL-MCNC: 2.9 MMOL/L — CRITICAL LOW (ref 3.5–5.3)
POTASSIUM SERPL-MCNC: 3 MMOL/L — LOW (ref 3.5–5.3)
POTASSIUM SERPL-MCNC: 3 MMOL/L — LOW (ref 3.5–5.3)
POTASSIUM SERPL-MCNC: 3.1 MMOL/L — LOW (ref 3.5–5.3)
POTASSIUM SERPL-MCNC: 3.2 MMOL/L — LOW (ref 3.5–5.3)
POTASSIUM SERPL-MCNC: 3.3 MMOL/L — LOW (ref 3.5–5.3)
POTASSIUM SERPL-MCNC: 3.3 MMOL/L — LOW (ref 3.5–5.3)
POTASSIUM SERPL-MCNC: 3.4 MMOL/L — LOW (ref 3.5–5.3)
POTASSIUM SERPL-MCNC: 3.5 MMOL/L — SIGNIFICANT CHANGE UP (ref 3.5–5.3)
POTASSIUM SERPL-MCNC: 3.6 MMOL/L — SIGNIFICANT CHANGE UP (ref 3.5–5.3)
POTASSIUM SERPL-MCNC: 3.8 MMOL/L — SIGNIFICANT CHANGE UP (ref 3.5–5.3)
POTASSIUM SERPL-MCNC: 3.8 MMOL/L — SIGNIFICANT CHANGE UP (ref 3.5–5.3)
POTASSIUM SERPL-MCNC: 3.9 MMOL/L — SIGNIFICANT CHANGE UP (ref 3.5–5.3)
POTASSIUM SERPL-MCNC: 3.9 MMOL/L — SIGNIFICANT CHANGE UP (ref 3.5–5.3)
POTASSIUM SERPL-MCNC: 4.1 MMOL/L — SIGNIFICANT CHANGE UP (ref 3.5–5.3)
POTASSIUM SERPL-MCNC: 4.2 MMOL/L — SIGNIFICANT CHANGE UP (ref 3.5–5.3)
POTASSIUM SERPL-MCNC: 4.2 MMOL/L — SIGNIFICANT CHANGE UP (ref 3.5–5.3)
POTASSIUM SERPL-SCNC: 2.8 MMOL/L — CRITICAL LOW (ref 3.5–5.3)
POTASSIUM SERPL-SCNC: 2.9 MMOL/L — CRITICAL LOW (ref 3.5–5.3)
POTASSIUM SERPL-SCNC: 3 MMOL/L — LOW (ref 3.5–5.3)
POTASSIUM SERPL-SCNC: 3 MMOL/L — LOW (ref 3.5–5.3)
POTASSIUM SERPL-SCNC: 3.1 MMOL/L — LOW (ref 3.5–5.3)
POTASSIUM SERPL-SCNC: 3.2 MMOL/L — LOW (ref 3.5–5.3)
POTASSIUM SERPL-SCNC: 3.3 MMOL/L — LOW (ref 3.5–5.3)
POTASSIUM SERPL-SCNC: 3.3 MMOL/L — LOW (ref 3.5–5.3)
POTASSIUM SERPL-SCNC: 3.4 MMOL/L — LOW (ref 3.5–5.3)
POTASSIUM SERPL-SCNC: 3.5 MMOL/L — SIGNIFICANT CHANGE UP (ref 3.5–5.3)
POTASSIUM SERPL-SCNC: 3.6 MMOL/L — SIGNIFICANT CHANGE UP (ref 3.5–5.3)
POTASSIUM SERPL-SCNC: 3.8 MMOL/L — SIGNIFICANT CHANGE UP (ref 3.5–5.3)
POTASSIUM SERPL-SCNC: 3.8 MMOL/L — SIGNIFICANT CHANGE UP (ref 3.5–5.3)
POTASSIUM SERPL-SCNC: 3.9 MMOL/L — SIGNIFICANT CHANGE UP (ref 3.5–5.3)
POTASSIUM SERPL-SCNC: 3.9 MMOL/L — SIGNIFICANT CHANGE UP (ref 3.5–5.3)
POTASSIUM SERPL-SCNC: 4.1 MMOL/L — SIGNIFICANT CHANGE UP (ref 3.5–5.3)
POTASSIUM SERPL-SCNC: 4.2 MMOL/L — SIGNIFICANT CHANGE UP (ref 3.5–5.3)
POTASSIUM SERPL-SCNC: 4.2 MMOL/L — SIGNIFICANT CHANGE UP (ref 3.5–5.3)
PROCALCITONIN SERPL-MCNC: 0.4 NG/ML — HIGH
PROT FLD-MCNC: 2.3 G/DL — SIGNIFICANT CHANGE UP
PROT FLD-MCNC: 2.6 G/DL — SIGNIFICANT CHANGE UP
PROT SERPL-MCNC: 5.7 G/DL — LOW (ref 6–8.3)
PROT SERPL-MCNC: 5.7 G/DL — LOW (ref 6–8.3)
PROT SERPL-MCNC: 5.8 G/DL — LOW (ref 6–8.3)
PROT SERPL-MCNC: 6.2 G/DL — SIGNIFICANT CHANGE UP (ref 6–8.3)
PROT SERPL-MCNC: 6.6 G/DL — SIGNIFICANT CHANGE UP (ref 6–8.3)
PROT SERPL-MCNC: 6.7 G/DL — SIGNIFICANT CHANGE UP (ref 6–8.3)
PROT UR-MCNC: 100
PROT UR-MCNC: 30 MG/DL
PROTHROM AB SERPL-ACNC: 13.2 SEC — SIGNIFICANT CHANGE UP (ref 10.6–13.6)
PROTHROM AB SERPL-ACNC: 13.9 SEC — HIGH (ref 10.6–13.6)
PROTHROM AB SERPL-ACNC: 14 SEC — HIGH (ref 10.6–13.6)
RBC # BLD: 2.6 M/UL — LOW (ref 4.2–5.8)
RBC # BLD: 2.86 M/UL — LOW (ref 4.2–5.8)
RBC # BLD: 2.9 M/UL — LOW (ref 4.2–5.8)
RBC # BLD: 2.94 M/UL — LOW (ref 4.2–5.8)
RBC # BLD: 3.03 M/UL — LOW (ref 4.2–5.8)
RBC # BLD: 3.09 M/UL — LOW (ref 4.2–5.8)
RBC # BLD: 3.09 M/UL — LOW (ref 4.2–5.8)
RBC # BLD: 3.11 M/UL — LOW (ref 4.2–5.8)
RBC # BLD: 3.12 M/UL — LOW (ref 4.2–5.8)
RBC # BLD: 3.13 M/UL — LOW (ref 4.2–5.8)
RBC # BLD: 3.15 M/UL — LOW (ref 4.2–5.8)
RBC # BLD: 3.17 M/UL — LOW (ref 4.2–5.8)
RBC # BLD: 3.18 M/UL — LOW (ref 4.2–5.8)
RBC # BLD: 3.23 M/UL — LOW (ref 4.2–5.8)
RBC # BLD: 3.25 M/UL — LOW (ref 4.2–5.8)
RBC # BLD: 3.25 M/UL — LOW (ref 4.2–5.8)
RBC # BLD: 3.27 M/UL — LOW (ref 4.2–5.8)
RBC # BLD: 3.32 M/UL — LOW (ref 4.2–5.8)
RBC # BLD: 3.35 M/UL — LOW (ref 4.2–5.8)
RBC # BLD: 3.36 M/UL — LOW (ref 4.2–5.8)
RBC # BLD: 3.61 M/UL — LOW (ref 4.2–5.8)
RBC # BLD: 3.61 M/UL — LOW (ref 4.2–5.8)
RBC # FLD: 14.5 % — SIGNIFICANT CHANGE UP (ref 10.3–14.5)
RBC # FLD: 14.5 % — SIGNIFICANT CHANGE UP (ref 10.3–14.5)
RBC # FLD: 14.6 % — HIGH (ref 10.3–14.5)
RBC # FLD: 14.7 % — HIGH (ref 10.3–14.5)
RBC # FLD: 14.7 % — HIGH (ref 10.3–14.5)
RBC # FLD: 17.4 % — HIGH (ref 10.3–14.5)
RBC # FLD: 17.5 % — HIGH (ref 10.3–14.5)
RBC # FLD: 17.6 % — HIGH (ref 10.3–14.5)
RBC # FLD: 17.8 % — HIGH (ref 10.3–14.5)
RBC # FLD: 17.8 % — HIGH (ref 10.3–14.5)
RBC # FLD: 17.9 % — HIGH (ref 10.3–14.5)
RBC # FLD: 18 % — HIGH (ref 10.3–14.5)
RBC # FLD: 18.5 % — HIGH (ref 10.3–14.5)
RBC # FLD: 18.5 % — HIGH (ref 10.3–14.5)
RBC # FLD: 18.7 % — HIGH (ref 10.3–14.5)
RBC # FLD: 18.8 % — HIGH (ref 10.3–14.5)
RBC # FLD: 19.1 % — HIGH (ref 10.3–14.5)
RBC # FLD: 19.5 % — HIGH (ref 10.3–14.5)
RBC CASTS # UR COMP ASSIST: ABNORMAL /HPF (ref 0–4)
RBC CASTS # UR COMP ASSIST: ABNORMAL /HPF (ref 0–4)
RCV VOL RI: 1000 /UL — HIGH (ref 0–0)
RCV VOL RI: 6 /UL — HIGH (ref 0–0)
SAO2 % BLDA: 85 % — LOW (ref 92–96)
SAO2 % BLDA: 90 % — LOW (ref 92–96)
SAO2 % BLDA: 94 % — SIGNIFICANT CHANGE UP (ref 92–96)
SAO2 % BLDA: 97 % — HIGH (ref 92–96)
SARS-COV-2 IGG SERPL QL IA: NEGATIVE — SIGNIFICANT CHANGE UP
SARS-COV-2 IGG SERPL QL IA: NEGATIVE — SIGNIFICANT CHANGE UP
SARS-COV-2 IGM SERPL IA-ACNC: 0.08 INDEX — SIGNIFICANT CHANGE UP
SARS-COV-2 IGM SERPL IA-ACNC: <0.1 INDEX — SIGNIFICANT CHANGE UP
SARS-COV-2 RNA SPEC QL NAA+PROBE: SIGNIFICANT CHANGE UP
SARS-COV-2 RNA SPEC QL NAA+PROBE: SIGNIFICANT CHANGE UP
SODIUM SERPL-SCNC: 129 MMOL/L — LOW (ref 135–145)
SODIUM SERPL-SCNC: 129 MMOL/L — LOW (ref 135–145)
SODIUM SERPL-SCNC: 130 MMOL/L — LOW (ref 135–145)
SODIUM SERPL-SCNC: 131 MMOL/L — LOW (ref 135–145)
SODIUM SERPL-SCNC: 131 MMOL/L — LOW (ref 135–145)
SODIUM SERPL-SCNC: 132 MMOL/L — LOW (ref 135–145)
SODIUM SERPL-SCNC: 133 MMOL/L — LOW (ref 135–145)
SODIUM SERPL-SCNC: 133 MMOL/L — LOW (ref 135–145)
SODIUM SERPL-SCNC: 134 MMOL/L — LOW (ref 135–145)
SODIUM SERPL-SCNC: 134 MMOL/L — LOW (ref 135–145)
SODIUM SERPL-SCNC: 136 MMOL/L — SIGNIFICANT CHANGE UP (ref 135–145)
SODIUM SERPL-SCNC: 138 MMOL/L — SIGNIFICANT CHANGE UP (ref 135–145)
SODIUM SERPL-SCNC: 139 MMOL/L — SIGNIFICANT CHANGE UP (ref 135–145)
SODIUM SERPL-SCNC: 140 MMOL/L — SIGNIFICANT CHANGE UP (ref 135–145)
SODIUM SERPL-SCNC: 141 MMOL/L — SIGNIFICANT CHANGE UP (ref 135–145)
SODIUM SERPL-SCNC: 142 MMOL/L — SIGNIFICANT CHANGE UP (ref 135–145)
SODIUM SERPL-SCNC: 145 MMOL/L — SIGNIFICANT CHANGE UP (ref 135–145)
SODIUM SERPL-SCNC: 149 MMOL/L — HIGH (ref 135–145)
SP GR SPEC: 1.01 — SIGNIFICANT CHANGE UP (ref 1.01–1.02)
SP GR SPEC: 1.01 — SIGNIFICANT CHANGE UP (ref 1.01–1.02)
SPECIMEN SOURCE: SIGNIFICANT CHANGE UP
TOTAL NUCLEATED CELL COUNT, BODY FLUID: 149 /UL — SIGNIFICANT CHANGE UP
TOTAL NUCLEATED CELL COUNT, BODY FLUID: 88 /UL — SIGNIFICANT CHANGE UP
TROPONIN I SERPL-MCNC: <.017 NG/ML — LOW (ref 0.02–0.06)
TROPONIN I SERPL-MCNC: <.017 NG/ML — LOW (ref 0.02–0.06)
TSH SERPL-MCNC: 3.87 UIU/ML — SIGNIFICANT CHANGE UP (ref 0.27–4.2)
TUBE TYPE: SIGNIFICANT CHANGE UP
TUBE TYPE: SIGNIFICANT CHANGE UP
UROBILINOGEN FLD QL: 1
UROBILINOGEN FLD QL: NEGATIVE — SIGNIFICANT CHANGE UP
WBC # BLD: 10.13 K/UL — SIGNIFICANT CHANGE UP (ref 3.8–10.5)
WBC # BLD: 10.77 K/UL — HIGH (ref 3.8–10.5)
WBC # BLD: 10.78 K/UL — HIGH (ref 3.8–10.5)
WBC # BLD: 11.17 K/UL — HIGH (ref 3.8–10.5)
WBC # BLD: 11.89 K/UL — HIGH (ref 3.8–10.5)
WBC # BLD: 12.12 K/UL — HIGH (ref 3.8–10.5)
WBC # BLD: 13.01 K/UL — HIGH (ref 3.8–10.5)
WBC # BLD: 7.67 K/UL — SIGNIFICANT CHANGE UP (ref 3.8–10.5)
WBC # BLD: 8.11 K/UL — SIGNIFICANT CHANGE UP (ref 3.8–10.5)
WBC # BLD: 8.22 K/UL — SIGNIFICANT CHANGE UP (ref 3.8–10.5)
WBC # BLD: 8.66 K/UL — SIGNIFICANT CHANGE UP (ref 3.8–10.5)
WBC # BLD: 8.76 K/UL — SIGNIFICANT CHANGE UP (ref 3.8–10.5)
WBC # BLD: 8.87 K/UL — SIGNIFICANT CHANGE UP (ref 3.8–10.5)
WBC # BLD: 8.99 K/UL — SIGNIFICANT CHANGE UP (ref 3.8–10.5)
WBC # BLD: 9.05 K/UL — SIGNIFICANT CHANGE UP (ref 3.8–10.5)
WBC # BLD: 9.11 K/UL — SIGNIFICANT CHANGE UP (ref 3.8–10.5)
WBC # BLD: 9.33 K/UL — SIGNIFICANT CHANGE UP (ref 3.8–10.5)
WBC # BLD: 9.5 K/UL — SIGNIFICANT CHANGE UP (ref 3.8–10.5)
WBC # BLD: 9.58 K/UL — SIGNIFICANT CHANGE UP (ref 3.8–10.5)
WBC # BLD: 9.61 K/UL — SIGNIFICANT CHANGE UP (ref 3.8–10.5)
WBC # BLD: 9.8 K/UL — SIGNIFICANT CHANGE UP (ref 3.8–10.5)
WBC # BLD: 9.85 K/UL — SIGNIFICANT CHANGE UP (ref 3.8–10.5)
WBC # FLD AUTO: 10.13 K/UL — SIGNIFICANT CHANGE UP (ref 3.8–10.5)
WBC # FLD AUTO: 10.77 K/UL — HIGH (ref 3.8–10.5)
WBC # FLD AUTO: 10.78 K/UL — HIGH (ref 3.8–10.5)
WBC # FLD AUTO: 11.17 K/UL — HIGH (ref 3.8–10.5)
WBC # FLD AUTO: 11.89 K/UL — HIGH (ref 3.8–10.5)
WBC # FLD AUTO: 12.12 K/UL — HIGH (ref 3.8–10.5)
WBC # FLD AUTO: 13.01 K/UL — HIGH (ref 3.8–10.5)
WBC # FLD AUTO: 7.67 K/UL — SIGNIFICANT CHANGE UP (ref 3.8–10.5)
WBC # FLD AUTO: 8.11 K/UL — SIGNIFICANT CHANGE UP (ref 3.8–10.5)
WBC # FLD AUTO: 8.22 K/UL — SIGNIFICANT CHANGE UP (ref 3.8–10.5)
WBC # FLD AUTO: 8.66 K/UL — SIGNIFICANT CHANGE UP (ref 3.8–10.5)
WBC # FLD AUTO: 8.76 K/UL — SIGNIFICANT CHANGE UP (ref 3.8–10.5)
WBC # FLD AUTO: 8.87 K/UL — SIGNIFICANT CHANGE UP (ref 3.8–10.5)
WBC # FLD AUTO: 8.99 K/UL — SIGNIFICANT CHANGE UP (ref 3.8–10.5)
WBC # FLD AUTO: 9.05 K/UL — SIGNIFICANT CHANGE UP (ref 3.8–10.5)
WBC # FLD AUTO: 9.11 K/UL — SIGNIFICANT CHANGE UP (ref 3.8–10.5)
WBC # FLD AUTO: 9.33 K/UL — SIGNIFICANT CHANGE UP (ref 3.8–10.5)
WBC # FLD AUTO: 9.5 K/UL — SIGNIFICANT CHANGE UP (ref 3.8–10.5)
WBC # FLD AUTO: 9.58 K/UL — SIGNIFICANT CHANGE UP (ref 3.8–10.5)
WBC # FLD AUTO: 9.61 K/UL — SIGNIFICANT CHANGE UP (ref 3.8–10.5)
WBC # FLD AUTO: 9.8 K/UL — SIGNIFICANT CHANGE UP (ref 3.8–10.5)
WBC # FLD AUTO: 9.85 K/UL — SIGNIFICANT CHANGE UP (ref 3.8–10.5)
WBC UR QL: ABNORMAL /HPF (ref 0–5)
WBC UR QL: ABNORMAL /HPF (ref 0–5)

## 2020-01-01 PROCEDURE — 71045 X-RAY EXAM CHEST 1 VIEW: CPT | Mod: 26

## 2020-01-01 PROCEDURE — 99233 SBSQ HOSP IP/OBS HIGH 50: CPT

## 2020-01-01 PROCEDURE — 82945 GLUCOSE OTHER FLUID: CPT

## 2020-01-01 PROCEDURE — 70450 CT HEAD/BRAIN W/O DYE: CPT | Mod: 26

## 2020-01-01 PROCEDURE — 89051 BODY FLUID CELL COUNT: CPT

## 2020-01-01 PROCEDURE — 70450 CT HEAD/BRAIN W/O DYE: CPT

## 2020-01-01 PROCEDURE — 99232 SBSQ HOSP IP/OBS MODERATE 35: CPT

## 2020-01-01 PROCEDURE — 87205 SMEAR GRAM STAIN: CPT

## 2020-01-01 PROCEDURE — 74174 CTA ABD&PLVS W/CONTRAST: CPT | Mod: 26

## 2020-01-01 PROCEDURE — 99291 CRITICAL CARE FIRST HOUR: CPT | Mod: CS

## 2020-01-01 PROCEDURE — 82272 OCCULT BLD FECES 1-3 TESTS: CPT

## 2020-01-01 PROCEDURE — 99239 HOSP IP/OBS DSCHRG MGMT >30: CPT

## 2020-01-01 PROCEDURE — 71250 CT THORAX DX C-: CPT | Mod: 26

## 2020-01-01 PROCEDURE — 99233 SBSQ HOSP IP/OBS HIGH 50: CPT | Mod: GC

## 2020-01-01 PROCEDURE — 71275 CT ANGIOGRAPHY CHEST: CPT | Mod: 26

## 2020-01-01 PROCEDURE — 88112 CYTOPATH CELL ENHANCE TECH: CPT | Mod: 26

## 2020-01-01 PROCEDURE — 99497 ADVNCD CARE PLAN 30 MIN: CPT | Mod: 25

## 2020-01-01 PROCEDURE — 88305 TISSUE EXAM BY PATHOLOGIST: CPT

## 2020-01-01 PROCEDURE — 87086 URINE CULTURE/COLONY COUNT: CPT

## 2020-01-01 PROCEDURE — 84484 ASSAY OF TROPONIN QUANT: CPT

## 2020-01-01 PROCEDURE — 84157 ASSAY OF PROTEIN OTHER: CPT

## 2020-01-01 PROCEDURE — 93005 ELECTROCARDIOGRAM TRACING: CPT

## 2020-01-01 PROCEDURE — 99223 1ST HOSP IP/OBS HIGH 75: CPT | Mod: 25

## 2020-01-01 PROCEDURE — 88112 CYTOPATH CELL ENHANCE TECH: CPT

## 2020-01-01 PROCEDURE — 71045 X-RAY EXAM CHEST 1 VIEW: CPT

## 2020-01-01 PROCEDURE — 93010 ELECTROCARDIOGRAM REPORT: CPT

## 2020-01-01 PROCEDURE — 81001 URINALYSIS AUTO W/SCOPE: CPT

## 2020-01-01 PROCEDURE — 99285 EMERGENCY DEPT VISIT HI MDM: CPT | Mod: 25

## 2020-01-01 PROCEDURE — 74174 CTA ABD&PLVS W/CONTRAST: CPT

## 2020-01-01 PROCEDURE — 80048 BASIC METABOLIC PNL TOTAL CA: CPT

## 2020-01-01 PROCEDURE — 86769 SARS-COV-2 COVID-19 ANTIBODY: CPT

## 2020-01-01 PROCEDURE — 87075 CULTR BACTERIA EXCEPT BLOOD: CPT

## 2020-01-01 PROCEDURE — 88305 TISSUE EXAM BY PATHOLOGIST: CPT | Mod: 26

## 2020-01-01 PROCEDURE — 85025 COMPLETE CBC W/AUTO DIFF WBC: CPT

## 2020-01-01 PROCEDURE — 80053 COMPREHEN METABOLIC PANEL: CPT

## 2020-01-01 PROCEDURE — 94640 AIRWAY INHALATION TREATMENT: CPT

## 2020-01-01 PROCEDURE — 72148 MRI LUMBAR SPINE W/O DYE: CPT

## 2020-01-01 PROCEDURE — 71045 X-RAY EXAM CHEST 1 VIEW: CPT | Mod: 26,77

## 2020-01-01 PROCEDURE — 99222 1ST HOSP IP/OBS MODERATE 55: CPT

## 2020-01-01 PROCEDURE — 84443 ASSAY THYROID STIM HORMONE: CPT

## 2020-01-01 PROCEDURE — 97162 PT EVAL MOD COMPLEX 30 MIN: CPT

## 2020-01-01 PROCEDURE — 87102 FUNGUS ISOLATION CULTURE: CPT

## 2020-01-01 PROCEDURE — 85027 COMPLETE CBC AUTOMATED: CPT

## 2020-01-01 PROCEDURE — 99285 EMERGENCY DEPT VISIT HI MDM: CPT | Mod: CS

## 2020-01-01 PROCEDURE — 93306 TTE W/DOPPLER COMPLETE: CPT | Mod: 26

## 2020-01-01 PROCEDURE — 36415 COLL VENOUS BLD VENIPUNCTURE: CPT

## 2020-01-01 PROCEDURE — 71275 CT ANGIOGRAPHY CHEST: CPT

## 2020-01-01 PROCEDURE — 94760 N-INVAS EAR/PLS OXIMETRY 1: CPT

## 2020-01-01 PROCEDURE — 82042 OTHER SOURCE ALBUMIN QUAN EA: CPT

## 2020-01-01 PROCEDURE — 83986 ASSAY PH BODY FLUID NOS: CPT

## 2020-01-01 PROCEDURE — 36000 PLACE NEEDLE IN VEIN: CPT

## 2020-01-01 PROCEDURE — U0003: CPT

## 2020-01-01 PROCEDURE — 99223 1ST HOSP IP/OBS HIGH 75: CPT

## 2020-01-01 PROCEDURE — 72148 MRI LUMBAR SPINE W/O DYE: CPT | Mod: 26

## 2020-01-01 PROCEDURE — 83615 LACTATE (LD) (LDH) ENZYME: CPT

## 2020-01-01 PROCEDURE — 87070 CULTURE OTHR SPECIMN AEROBIC: CPT

## 2020-01-01 PROCEDURE — 85610 PROTHROMBIN TIME: CPT

## 2020-01-01 RX ORDER — MORPHINE SULFATE 50 MG/1
2 CAPSULE, EXTENDED RELEASE ORAL
Refills: 0 | Status: DISCONTINUED | OUTPATIENT
Start: 2020-01-01 | End: 2020-01-01

## 2020-01-01 RX ORDER — PANTOPRAZOLE SODIUM 20 MG/1
40 TABLET, DELAYED RELEASE ORAL
Refills: 0 | Status: DISCONTINUED | OUTPATIENT
Start: 2020-01-01 | End: 2020-01-01

## 2020-01-01 RX ORDER — VANCOMYCIN HCL 1 G
VIAL (EA) INTRAVENOUS
Refills: 0 | Status: DISCONTINUED | OUTPATIENT
Start: 2020-01-01 | End: 2020-01-01

## 2020-01-01 RX ORDER — ENOXAPARIN SODIUM 100 MG/ML
60 INJECTION SUBCUTANEOUS EVERY 12 HOURS
Refills: 0 | Status: DISCONTINUED | OUTPATIENT
Start: 2020-01-01 | End: 2020-01-01

## 2020-01-01 RX ORDER — IPRATROPIUM/ALBUTEROL SULFATE 18-103MCG
3 AEROSOL WITH ADAPTER (GRAM) INHALATION EVERY 6 HOURS
Refills: 0 | Status: DISCONTINUED | OUTPATIENT
Start: 2020-01-01 | End: 2020-01-01

## 2020-01-01 RX ORDER — CEFTRIAXONE 500 MG/1
2000 INJECTION, POWDER, FOR SOLUTION INTRAMUSCULAR; INTRAVENOUS EVERY 12 HOURS
Refills: 0 | Status: DISCONTINUED | OUTPATIENT
Start: 2020-01-01 | End: 2020-01-01

## 2020-01-01 RX ORDER — IPRATROPIUM/ALBUTEROL SULFATE 18-103MCG
3 AEROSOL WITH ADAPTER (GRAM) INHALATION EVERY 4 HOURS
Refills: 0 | Status: DISCONTINUED | OUTPATIENT
Start: 2020-01-01 | End: 2020-01-01

## 2020-01-01 RX ORDER — MAGNESIUM SULFATE 500 MG/ML
1 VIAL (ML) INJECTION
Refills: 0 | Status: COMPLETED | OUTPATIENT
Start: 2020-01-01 | End: 2020-01-01

## 2020-01-01 RX ORDER — ASPIRIN/CALCIUM CARB/MAGNESIUM 324 MG
81 TABLET ORAL DAILY
Refills: 0 | Status: DISCONTINUED | OUTPATIENT
Start: 2020-01-01 | End: 2020-01-01

## 2020-01-01 RX ORDER — INFLUENZA VIRUS VACCINE 15; 15; 15; 15 UG/.5ML; UG/.5ML; UG/.5ML; UG/.5ML
0.5 SUSPENSION INTRAMUSCULAR ONCE
Refills: 0 | Status: DISCONTINUED | OUTPATIENT
Start: 2020-01-01 | End: 2020-01-01

## 2020-01-01 RX ORDER — POTASSIUM CHLORIDE 20 MEQ
40 PACKET (EA) ORAL EVERY 4 HOURS
Refills: 0 | Status: COMPLETED | OUTPATIENT
Start: 2020-01-01 | End: 2020-01-01

## 2020-01-01 RX ORDER — BUDESONIDE, MICRONIZED 100 %
0.25 POWDER (GRAM) MISCELLANEOUS
Refills: 0 | Status: DISCONTINUED | OUTPATIENT
Start: 2020-01-01 | End: 2020-01-01

## 2020-01-01 RX ORDER — POLYETHYLENE GLYCOL 3350 17 G/17G
17 POWDER, FOR SOLUTION ORAL
Qty: 0 | Refills: 0 | DISCHARGE

## 2020-01-01 RX ORDER — METOPROLOL TARTRATE 50 MG
25 TABLET ORAL
Refills: 0 | Status: DISCONTINUED | OUTPATIENT
Start: 2020-01-01 | End: 2020-01-01

## 2020-01-01 RX ORDER — POTASSIUM CHLORIDE 20 MEQ
40 PACKET (EA) ORAL ONCE
Refills: 0 | Status: COMPLETED | OUTPATIENT
Start: 2020-01-01 | End: 2020-01-01

## 2020-01-01 RX ORDER — MAGNESIUM HYDROXIDE 400 MG/1
30 TABLET, CHEWABLE ORAL AT BEDTIME
Refills: 0 | Status: DISCONTINUED | OUTPATIENT
Start: 2020-01-01 | End: 2020-01-01

## 2020-01-01 RX ORDER — AMLODIPINE BESYLATE 2.5 MG/1
1 TABLET ORAL
Qty: 0 | Refills: 0 | DISCHARGE

## 2020-01-01 RX ORDER — POTASSIUM CHLORIDE 20 MEQ
10 PACKET (EA) ORAL
Refills: 0 | Status: COMPLETED | OUTPATIENT
Start: 2020-01-01 | End: 2020-01-01

## 2020-01-01 RX ORDER — POTASSIUM CHLORIDE 20 MEQ
10 PACKET (EA) ORAL DAILY
Refills: 0 | Status: DISCONTINUED | OUTPATIENT
Start: 2020-01-01 | End: 2020-01-01

## 2020-01-01 RX ORDER — AMIODARONE HYDROCHLORIDE 400 MG/1
200 TABLET ORAL DAILY
Refills: 0 | Status: DISCONTINUED | OUTPATIENT
Start: 2020-01-01 | End: 2020-01-01

## 2020-01-01 RX ORDER — ALPRAZOLAM 0.25 MG
0.25 TABLET ORAL AT BEDTIME
Refills: 0 | Status: DISCONTINUED | OUTPATIENT
Start: 2020-01-01 | End: 2020-01-01

## 2020-01-01 RX ORDER — ACETAMINOPHEN 500 MG
650 TABLET ORAL EVERY 8 HOURS
Refills: 0 | Status: DISCONTINUED | OUTPATIENT
Start: 2020-01-01 | End: 2020-01-01

## 2020-01-01 RX ORDER — ENOXAPARIN SODIUM 100 MG/ML
40 INJECTION SUBCUTANEOUS DAILY
Refills: 0 | Status: DISCONTINUED | OUTPATIENT
Start: 2020-01-01 | End: 2020-01-01

## 2020-01-01 RX ORDER — POTASSIUM CHLORIDE 20 MEQ
10 PACKET (EA) ORAL
Refills: 0 | Status: DISCONTINUED | OUTPATIENT
Start: 2020-01-01 | End: 2020-01-01

## 2020-01-01 RX ORDER — CHLORHEXIDINE GLUCONATE 213 G/1000ML
1 SOLUTION TOPICAL
Refills: 0 | Status: DISCONTINUED | OUTPATIENT
Start: 2020-01-01 | End: 2020-01-01

## 2020-01-01 RX ORDER — ACETAMINOPHEN 500 MG
650 TABLET ORAL ONCE
Refills: 0 | Status: COMPLETED | OUTPATIENT
Start: 2020-01-01 | End: 2020-01-01

## 2020-01-01 RX ORDER — CEFEPIME 1 G/1
1000 INJECTION, POWDER, FOR SOLUTION INTRAMUSCULAR; INTRAVENOUS ONCE
Refills: 0 | Status: COMPLETED | OUTPATIENT
Start: 2020-01-01 | End: 2020-01-01

## 2020-01-01 RX ORDER — CEFTRIAXONE 500 MG/1
INJECTION, POWDER, FOR SOLUTION INTRAMUSCULAR; INTRAVENOUS
Refills: 0 | Status: DISCONTINUED | OUTPATIENT
Start: 2020-01-01 | End: 2020-01-01

## 2020-01-01 RX ORDER — NITROFURANTOIN MACROCRYSTAL 50 MG
1 CAPSULE ORAL
Qty: 0 | Refills: 0 | DISCHARGE
Start: 2020-01-01 | End: 2020-01-01

## 2020-01-01 RX ORDER — PANTOPRAZOLE SODIUM 20 MG/1
40 TABLET, DELAYED RELEASE ORAL DAILY
Refills: 0 | Status: DISCONTINUED | OUTPATIENT
Start: 2020-01-01 | End: 2020-01-01

## 2020-01-01 RX ORDER — SENNA PLUS 8.6 MG/1
2 TABLET ORAL
Qty: 0 | Refills: 0 | DISCHARGE

## 2020-01-01 RX ORDER — FERROUS SULFATE 325(65) MG
0 TABLET ORAL
Qty: 0 | Refills: 0 | DISCHARGE

## 2020-01-01 RX ORDER — IPRATROPIUM BROMIDE 0.2 MG/ML
2 SOLUTION, NON-ORAL INHALATION EVERY 6 HOURS
Refills: 0 | Status: DISCONTINUED | OUTPATIENT
Start: 2020-01-01 | End: 2020-01-01

## 2020-01-01 RX ORDER — SENNA PLUS 8.6 MG/1
2 TABLET ORAL AT BEDTIME
Refills: 0 | Status: DISCONTINUED | OUTPATIENT
Start: 2020-01-01 | End: 2020-01-01

## 2020-01-01 RX ORDER — ACETAMINOPHEN 500 MG
2 TABLET ORAL
Qty: 0 | Refills: 0 | DISCHARGE

## 2020-01-01 RX ORDER — MIRTAZAPINE 45 MG/1
0.5 TABLET, ORALLY DISINTEGRATING ORAL
Qty: 0 | Refills: 0 | DISCHARGE

## 2020-01-01 RX ORDER — SIMETHICONE 80 MG/1
80 TABLET, CHEWABLE ORAL DAILY
Refills: 0 | Status: DISCONTINUED | OUTPATIENT
Start: 2020-01-01 | End: 2020-01-01

## 2020-01-01 RX ORDER — APIXABAN 2.5 MG/1
5 TABLET, FILM COATED ORAL EVERY 12 HOURS
Refills: 0 | Status: DISCONTINUED | OUTPATIENT
Start: 2020-01-01 | End: 2020-01-01

## 2020-01-01 RX ORDER — MAGNESIUM HYDROXIDE 400 MG/1
30 TABLET, CHEWABLE ORAL
Qty: 0 | Refills: 0 | DISCHARGE

## 2020-01-01 RX ORDER — HYDROCORTISONE 0.5 %
1 CREAM (GRAM) TOPICAL
Qty: 0 | Refills: 0 | DISCHARGE

## 2020-01-01 RX ORDER — TAMSULOSIN HYDROCHLORIDE 0.4 MG/1
1 CAPSULE ORAL
Qty: 0 | Refills: 0 | DISCHARGE

## 2020-01-01 RX ORDER — FUROSEMIDE 40 MG
1 TABLET ORAL
Qty: 0 | Refills: 0 | DISCHARGE

## 2020-01-01 RX ORDER — BUDESONIDE AND FORMOTEROL FUMARATE DIHYDRATE 160; 4.5 UG/1; UG/1
2 AEROSOL RESPIRATORY (INHALATION)
Refills: 0 | Status: DISCONTINUED | OUTPATIENT
Start: 2020-01-01 | End: 2020-01-01

## 2020-01-01 RX ORDER — TAMSULOSIN HYDROCHLORIDE 0.4 MG/1
0.4 CAPSULE ORAL AT BEDTIME
Refills: 0 | Status: DISCONTINUED | OUTPATIENT
Start: 2020-01-01 | End: 2020-01-01

## 2020-01-01 RX ORDER — ATORVASTATIN CALCIUM 80 MG/1
1 TABLET, FILM COATED ORAL
Qty: 0 | Refills: 0 | DISCHARGE

## 2020-01-01 RX ORDER — IPRATROPIUM BROMIDE 0.2 MG/ML
1 SOLUTION, NON-ORAL INHALATION ONCE
Refills: 0 | Status: COMPLETED | OUTPATIENT
Start: 2020-01-01 | End: 2020-01-01

## 2020-01-01 RX ORDER — BUDESONIDE AND FORMOTEROL FUMARATE DIHYDRATE 160; 4.5 UG/1; UG/1
2 AEROSOL RESPIRATORY (INHALATION)
Qty: 0 | Refills: 0 | DISCHARGE

## 2020-01-01 RX ORDER — GABAPENTIN 400 MG/1
100 CAPSULE ORAL AT BEDTIME
Refills: 0 | Status: DISCONTINUED | OUTPATIENT
Start: 2020-01-01 | End: 2020-01-01

## 2020-01-01 RX ORDER — BUDESONIDE, MICRONIZED 100 %
2 POWDER (GRAM) MISCELLANEOUS
Qty: 0 | Refills: 0 | DISCHARGE

## 2020-01-01 RX ORDER — ONDANSETRON 8 MG/1
1 TABLET, FILM COATED ORAL
Qty: 0 | Refills: 0 | DISCHARGE

## 2020-01-01 RX ORDER — ASPIRIN/CALCIUM CARB/MAGNESIUM 324 MG
1 TABLET ORAL
Qty: 0 | Refills: 0 | DISCHARGE

## 2020-01-01 RX ORDER — DULOXETINE HYDROCHLORIDE 30 MG/1
1 CAPSULE, DELAYED RELEASE ORAL
Qty: 0 | Refills: 0 | DISCHARGE

## 2020-01-01 RX ORDER — FAMOTIDINE 10 MG/ML
20 INJECTION INTRAVENOUS ONCE
Refills: 0 | Status: DISCONTINUED | OUTPATIENT
Start: 2020-01-01 | End: 2020-01-01

## 2020-01-01 RX ORDER — LEVOTHYROXINE SODIUM 125 MCG
1 TABLET ORAL
Qty: 0 | Refills: 0 | DISCHARGE

## 2020-01-01 RX ORDER — MORPHINE SULFATE 50 MG/1
1 CAPSULE, EXTENDED RELEASE ORAL ONCE
Refills: 0 | Status: DISCONTINUED | OUTPATIENT
Start: 2020-01-01 | End: 2020-01-01

## 2020-01-01 RX ORDER — LEVOTHYROXINE SODIUM 125 MCG
100 TABLET ORAL DAILY
Refills: 0 | Status: DISCONTINUED | OUTPATIENT
Start: 2020-01-01 | End: 2020-01-01

## 2020-01-01 RX ORDER — CEFTRIAXONE 500 MG/1
2000 INJECTION, POWDER, FOR SOLUTION INTRAMUSCULAR; INTRAVENOUS EVERY 12 HOURS
Refills: 0 | Status: COMPLETED | OUTPATIENT
Start: 2020-01-01 | End: 2020-01-01

## 2020-01-01 RX ORDER — DEXTROSE 50 % IN WATER 50 %
25 SYRINGE (ML) INTRAVENOUS ONCE
Refills: 0 | Status: COMPLETED | OUTPATIENT
Start: 2020-01-01 | End: 2020-01-01

## 2020-01-01 RX ORDER — ACETYLCYSTEINE 200 MG/ML
4 VIAL (ML) MISCELLANEOUS THREE TIMES A DAY
Refills: 0 | Status: DISCONTINUED | OUTPATIENT
Start: 2020-01-01 | End: 2020-01-01

## 2020-01-01 RX ORDER — LIDOCAINE 4 G/100G
1 CREAM TOPICAL EVERY 24 HOURS
Refills: 0 | Status: DISCONTINUED | OUTPATIENT
Start: 2020-01-01 | End: 2020-01-01

## 2020-01-01 RX ORDER — METOPROLOL TARTRATE 50 MG
1 TABLET ORAL
Qty: 0 | Refills: 0 | DISCHARGE

## 2020-01-01 RX ORDER — ERYTHROPOIETIN 10000 [IU]/ML
1 INJECTION, SOLUTION INTRAVENOUS; SUBCUTANEOUS
Qty: 0 | Refills: 0 | DISCHARGE

## 2020-01-01 RX ORDER — POTASSIUM CHLORIDE 20 MEQ
1 PACKET (EA) ORAL
Qty: 0 | Refills: 0 | DISCHARGE

## 2020-01-01 RX ORDER — AMLODIPINE BESYLATE 2.5 MG/1
10 TABLET ORAL DAILY
Refills: 0 | Status: DISCONTINUED | OUTPATIENT
Start: 2020-01-01 | End: 2020-01-01

## 2020-01-01 RX ORDER — DULOXETINE HYDROCHLORIDE 30 MG/1
30 CAPSULE, DELAYED RELEASE ORAL DAILY
Refills: 0 | Status: DISCONTINUED | OUTPATIENT
Start: 2020-01-01 | End: 2020-01-01

## 2020-01-01 RX ORDER — FERROUS SULFATE 325(65) MG
1 TABLET ORAL
Qty: 0 | Refills: 0 | DISCHARGE

## 2020-01-01 RX ORDER — CEFTRIAXONE 500 MG/1
1000 INJECTION, POWDER, FOR SOLUTION INTRAMUSCULAR; INTRAVENOUS EVERY 24 HOURS
Refills: 0 | Status: DISCONTINUED | OUTPATIENT
Start: 2020-01-01 | End: 2020-01-01

## 2020-01-01 RX ORDER — HYDRALAZINE HCL 50 MG
10 TABLET ORAL ONCE
Refills: 0 | Status: COMPLETED | OUTPATIENT
Start: 2020-01-01 | End: 2020-01-01

## 2020-01-01 RX ORDER — IPRATROPIUM BROMIDE 0.2 MG/ML
2.5 SOLUTION, NON-ORAL INHALATION
Qty: 0 | Refills: 0 | DISCHARGE

## 2020-01-01 RX ORDER — FUROSEMIDE 40 MG
40 TABLET ORAL EVERY 12 HOURS
Refills: 0 | Status: DISCONTINUED | OUTPATIENT
Start: 2020-01-01 | End: 2020-01-01

## 2020-01-01 RX ORDER — ALBUTEROL 90 UG/1
2 AEROSOL, METERED ORAL ONCE
Refills: 0 | Status: COMPLETED | OUTPATIENT
Start: 2020-01-01 | End: 2020-01-01

## 2020-01-01 RX ORDER — ALBUTEROL 90 UG/1
2 AEROSOL, METERED ORAL EVERY 6 HOURS
Refills: 0 | Status: DISCONTINUED | OUTPATIENT
Start: 2020-01-01 | End: 2020-01-01

## 2020-01-01 RX ORDER — POTASSIUM PHOSPHATE, MONOBASIC POTASSIUM PHOSPHATE, DIBASIC 236; 224 MG/ML; MG/ML
15 INJECTION, SOLUTION INTRAVENOUS ONCE
Refills: 0 | Status: COMPLETED | OUTPATIENT
Start: 2020-01-01 | End: 2020-01-01

## 2020-01-01 RX ORDER — OMEPRAZOLE 10 MG/1
1 CAPSULE, DELAYED RELEASE ORAL
Qty: 0 | Refills: 0 | DISCHARGE

## 2020-01-01 RX ORDER — ACETAMINOPHEN 500 MG
650 TABLET ORAL EVERY 6 HOURS
Refills: 0 | Status: DISCONTINUED | OUTPATIENT
Start: 2020-01-01 | End: 2020-01-01

## 2020-01-01 RX ORDER — ACETYLCYSTEINE 200 MG/ML
2 VIAL (ML) MISCELLANEOUS EVERY 4 HOURS
Refills: 0 | Status: COMPLETED | OUTPATIENT
Start: 2020-01-01 | End: 2020-01-01

## 2020-01-01 RX ORDER — OXYMETAZOLINE HYDROCHLORIDE 0.5 MG/ML
2 SPRAY NASAL
Qty: 0 | Refills: 0 | DISCHARGE

## 2020-01-01 RX ORDER — FUROSEMIDE 40 MG
40 TABLET ORAL DAILY
Refills: 0 | Status: DISCONTINUED | OUTPATIENT
Start: 2020-01-01 | End: 2020-01-01

## 2020-01-01 RX ORDER — IPRATROPIUM BROMIDE 0.2 MG/ML
500 SOLUTION, NON-ORAL INHALATION
Refills: 0 | Status: DISCONTINUED | OUTPATIENT
Start: 2020-01-01 | End: 2020-01-01

## 2020-01-01 RX ORDER — ALBUMIN HUMAN 25 %
100 VIAL (ML) INTRAVENOUS EVERY 6 HOURS
Refills: 0 | Status: DISCONTINUED | OUTPATIENT
Start: 2020-01-01 | End: 2020-01-01

## 2020-01-01 RX ORDER — POLYETHYLENE GLYCOL 3350 17 G/17G
0 POWDER, FOR SOLUTION ORAL
Qty: 0 | Refills: 0 | DISCHARGE

## 2020-01-01 RX ORDER — MULTIVIT-MIN/FERROUS GLUCONATE 9 MG/15 ML
0 LIQUID (ML) ORAL
Qty: 0 | Refills: 0 | DISCHARGE

## 2020-01-01 RX ORDER — TAMSULOSIN HYDROCHLORIDE 0.4 MG/1
0.4 CAPSULE ORAL
Refills: 0 | Status: DISCONTINUED | OUTPATIENT
Start: 2020-01-01 | End: 2020-01-01

## 2020-01-01 RX ORDER — VANCOMYCIN HCL 1 G
1000 VIAL (EA) INTRAVENOUS DAILY
Refills: 0 | Status: DISCONTINUED | OUTPATIENT
Start: 2020-01-01 | End: 2020-01-01

## 2020-01-01 RX ORDER — AZITHROMYCIN 500 MG/1
TABLET, FILM COATED ORAL
Refills: 0 | Status: DISCONTINUED | OUTPATIENT
Start: 2020-01-01 | End: 2020-01-01

## 2020-01-01 RX ORDER — ATORVASTATIN CALCIUM 80 MG/1
40 TABLET, FILM COATED ORAL AT BEDTIME
Refills: 0 | Status: DISCONTINUED | OUTPATIENT
Start: 2020-01-01 | End: 2020-01-01

## 2020-01-01 RX ORDER — IPRATROPIUM BROMIDE 0.2 MG/ML
1 SOLUTION, NON-ORAL INHALATION EVERY 6 HOURS
Refills: 0 | Status: DISCONTINUED | OUTPATIENT
Start: 2020-01-01 | End: 2020-01-01

## 2020-01-01 RX ORDER — SALIVA SUBSTITUTE COMB NO.11 351 MG
5 POWDER IN PACKET (EA) MUCOUS MEMBRANE EVERY 12 HOURS
Refills: 0 | Status: DISCONTINUED | OUTPATIENT
Start: 2020-01-01 | End: 2020-01-01

## 2020-01-01 RX ORDER — VANCOMYCIN HCL 1 G
1000 VIAL (EA) INTRAVENOUS ONCE
Refills: 0 | Status: COMPLETED | OUTPATIENT
Start: 2020-01-01 | End: 2020-01-01

## 2020-01-01 RX ORDER — CEFTRIAXONE 500 MG/1
1000 INJECTION, POWDER, FOR SOLUTION INTRAMUSCULAR; INTRAVENOUS ONCE
Refills: 0 | Status: COMPLETED | OUTPATIENT
Start: 2020-01-01 | End: 2020-01-01

## 2020-01-01 RX ORDER — SODIUM CHLORIDE 9 MG/ML
1000 INJECTION, SOLUTION INTRAVENOUS
Refills: 0 | Status: DISCONTINUED | OUTPATIENT
Start: 2020-01-01 | End: 2020-01-01

## 2020-01-01 RX ORDER — SODIUM CHLORIDE 9 MG/ML
500 INJECTION INTRAMUSCULAR; INTRAVENOUS; SUBCUTANEOUS ONCE
Refills: 0 | Status: COMPLETED | OUTPATIENT
Start: 2020-01-01 | End: 2020-01-01

## 2020-01-01 RX ORDER — SODIUM CHLORIDE 9 MG/ML
3 INJECTION INTRAMUSCULAR; INTRAVENOUS; SUBCUTANEOUS EVERY 8 HOURS
Refills: 0 | Status: DISCONTINUED | OUTPATIENT
Start: 2020-01-01 | End: 2020-01-01

## 2020-01-01 RX ORDER — LEVALBUTEROL 1.25 MG/.5ML
3 SOLUTION, CONCENTRATE RESPIRATORY (INHALATION)
Qty: 0 | Refills: 0 | DISCHARGE

## 2020-01-01 RX ORDER — MIRTAZAPINE 45 MG/1
1 TABLET, ORALLY DISINTEGRATING ORAL
Qty: 0 | Refills: 0 | DISCHARGE

## 2020-01-01 RX ORDER — AZITHROMYCIN 500 MG/1
500 TABLET, FILM COATED ORAL EVERY 24 HOURS
Refills: 0 | Status: DISCONTINUED | OUTPATIENT
Start: 2020-01-01 | End: 2020-01-01

## 2020-01-01 RX ORDER — CEFEPIME 1 G/1
2000 INJECTION, POWDER, FOR SOLUTION INTRAMUSCULAR; INTRAVENOUS ONCE
Refills: 0 | Status: DISCONTINUED | OUTPATIENT
Start: 2020-01-01 | End: 2020-01-01

## 2020-01-01 RX ORDER — AMIODARONE HYDROCHLORIDE 400 MG/1
1 TABLET ORAL
Qty: 0 | Refills: 0 | DISCHARGE

## 2020-01-01 RX ORDER — LEVALBUTEROL 1.25 MG/.5ML
0.63 SOLUTION, CONCENTRATE RESPIRATORY (INHALATION)
Refills: 0 | Status: DISCONTINUED | OUTPATIENT
Start: 2020-01-01 | End: 2020-01-01

## 2020-01-01 RX ORDER — SODIUM CHLORIDE 9 MG/ML
1500 INJECTION INTRAMUSCULAR; INTRAVENOUS; SUBCUTANEOUS ONCE
Refills: 0 | Status: COMPLETED | OUTPATIENT
Start: 2020-01-01 | End: 2020-01-01

## 2020-01-01 RX ORDER — GABAPENTIN 400 MG/1
2 CAPSULE ORAL
Qty: 0 | Refills: 0 | DISCHARGE

## 2020-01-01 RX ORDER — AZITHROMYCIN 500 MG/1
500 TABLET, FILM COATED ORAL ONCE
Refills: 0 | Status: COMPLETED | OUTPATIENT
Start: 2020-01-01 | End: 2020-01-01

## 2020-01-01 RX ORDER — SIMETHICONE 80 MG/1
1 TABLET, CHEWABLE ORAL
Qty: 0 | Refills: 0 | DISCHARGE

## 2020-01-01 RX ORDER — AMPICILLIN TRIHYDRATE 250 MG
2 CAPSULE ORAL EVERY 6 HOURS
Refills: 0 | Status: DISCONTINUED | OUTPATIENT
Start: 2020-01-01 | End: 2020-01-01

## 2020-01-01 RX ORDER — ERYTHROPOIETIN 10000 [IU]/ML
0 INJECTION, SOLUTION INTRAVENOUS; SUBCUTANEOUS
Qty: 0 | Refills: 0 | DISCHARGE

## 2020-01-01 RX ORDER — FAMOTIDINE 10 MG/ML
20 INJECTION INTRAVENOUS ONCE
Refills: 0 | Status: COMPLETED | OUTPATIENT
Start: 2020-01-01 | End: 2020-01-01

## 2020-01-01 RX ADMIN — Medication 650 MILLIGRAM(S): at 06:16

## 2020-01-01 RX ADMIN — SENNA PLUS 2 TABLET(S): 8.6 TABLET ORAL at 23:20

## 2020-01-01 RX ADMIN — Medication 100 MICROGRAM(S): at 05:04

## 2020-01-01 RX ADMIN — LIDOCAINE 1 PATCH: 4 CREAM TOPICAL at 23:00

## 2020-01-01 RX ADMIN — PANTOPRAZOLE SODIUM 40 MILLIGRAM(S): 20 TABLET, DELAYED RELEASE ORAL at 06:09

## 2020-01-01 RX ADMIN — ALBUTEROL 2 PUFF(S): 90 AEROSOL, METERED ORAL at 16:20

## 2020-01-01 RX ADMIN — Medication 216 GRAM(S): at 05:42

## 2020-01-01 RX ADMIN — Medication 100 MICROGRAM(S): at 06:09

## 2020-01-01 RX ADMIN — LIDOCAINE 1 PATCH: 4 CREAM TOPICAL at 00:55

## 2020-01-01 RX ADMIN — LIDOCAINE 1 PATCH: 4 CREAM TOPICAL at 00:13

## 2020-01-01 RX ADMIN — DULOXETINE HYDROCHLORIDE 30 MILLIGRAM(S): 30 CAPSULE, DELAYED RELEASE ORAL at 10:37

## 2020-01-01 RX ADMIN — LIDOCAINE 1 PATCH: 4 CREAM TOPICAL at 14:30

## 2020-01-01 RX ADMIN — Medication 100 MILLIEQUIVALENT(S): at 10:31

## 2020-01-01 RX ADMIN — Medication 25 MILLIGRAM(S): at 06:07

## 2020-01-01 RX ADMIN — Medication 81 MILLIGRAM(S): at 11:13

## 2020-01-01 RX ADMIN — Medication 650 MILLIGRAM(S): at 20:45

## 2020-01-01 RX ADMIN — AMLODIPINE BESYLATE 10 MILLIGRAM(S): 2.5 TABLET ORAL at 05:59

## 2020-01-01 RX ADMIN — Medication 1000 MILLIGRAM(S): at 22:23

## 2020-01-01 RX ADMIN — Medication 216 GRAM(S): at 23:43

## 2020-01-01 RX ADMIN — CEFTRIAXONE 100 MILLIGRAM(S): 500 INJECTION, POWDER, FOR SOLUTION INTRAMUSCULAR; INTRAVENOUS at 17:48

## 2020-01-01 RX ADMIN — CEFTRIAXONE 100 MILLIGRAM(S): 500 INJECTION, POWDER, FOR SOLUTION INTRAMUSCULAR; INTRAVENOUS at 17:37

## 2020-01-01 RX ADMIN — Medication 1 PUFF(S): at 08:25

## 2020-01-01 RX ADMIN — Medication 20 MILLIGRAM(S): at 00:58

## 2020-01-01 RX ADMIN — Medication 650 MILLIGRAM(S): at 05:21

## 2020-01-01 RX ADMIN — TAMSULOSIN HYDROCHLORIDE 0.4 MILLIGRAM(S): 0.4 CAPSULE ORAL at 05:04

## 2020-01-01 RX ADMIN — Medication 40 MILLIGRAM(S): at 05:03

## 2020-01-01 RX ADMIN — Medication 1 PUFF(S): at 16:18

## 2020-01-01 RX ADMIN — Medication 10 MILLIEQUIVALENT(S): at 10:35

## 2020-01-01 RX ADMIN — Medication 40 MILLIGRAM(S): at 05:20

## 2020-01-01 RX ADMIN — DULOXETINE HYDROCHLORIDE 30 MILLIGRAM(S): 30 CAPSULE, DELAYED RELEASE ORAL at 11:28

## 2020-01-01 RX ADMIN — LIDOCAINE 1 PATCH: 4 CREAM TOPICAL at 11:48

## 2020-01-01 RX ADMIN — ALBUTEROL 2 PUFF(S): 90 AEROSOL, METERED ORAL at 21:25

## 2020-01-01 RX ADMIN — CHLORHEXIDINE GLUCONATE 1 APPLICATION(S): 213 SOLUTION TOPICAL at 05:06

## 2020-01-01 RX ADMIN — CEFTRIAXONE 100 MILLIGRAM(S): 500 INJECTION, POWDER, FOR SOLUTION INTRAMUSCULAR; INTRAVENOUS at 05:05

## 2020-01-01 RX ADMIN — ENOXAPARIN SODIUM 40 MILLIGRAM(S): 100 INJECTION SUBCUTANEOUS at 12:17

## 2020-01-01 RX ADMIN — Medication 1 PUFF(S): at 09:43

## 2020-01-01 RX ADMIN — Medication 40 MILLIGRAM(S): at 05:08

## 2020-01-01 RX ADMIN — AMLODIPINE BESYLATE 10 MILLIGRAM(S): 2.5 TABLET ORAL at 05:08

## 2020-01-01 RX ADMIN — Medication 216 GRAM(S): at 06:42

## 2020-01-01 RX ADMIN — LIDOCAINE 1 PATCH: 4 CREAM TOPICAL at 12:16

## 2020-01-01 RX ADMIN — PANTOPRAZOLE SODIUM 40 MILLIGRAM(S): 20 TABLET, DELAYED RELEASE ORAL at 05:05

## 2020-01-01 RX ADMIN — DULOXETINE HYDROCHLORIDE 30 MILLIGRAM(S): 30 CAPSULE, DELAYED RELEASE ORAL at 11:13

## 2020-01-01 RX ADMIN — ENOXAPARIN SODIUM 60 MILLIGRAM(S): 100 INJECTION SUBCUTANEOUS at 17:02

## 2020-01-01 RX ADMIN — ATORVASTATIN CALCIUM 40 MILLIGRAM(S): 80 TABLET, FILM COATED ORAL at 20:51

## 2020-01-01 RX ADMIN — Medication 40 MILLIEQUIVALENT(S): at 09:23

## 2020-01-01 RX ADMIN — Medication 50 MILLILITER(S): at 11:15

## 2020-01-01 RX ADMIN — TAMSULOSIN HYDROCHLORIDE 0.4 MILLIGRAM(S): 0.4 CAPSULE ORAL at 21:59

## 2020-01-01 RX ADMIN — CEFTRIAXONE 100 MILLIGRAM(S): 500 INJECTION, POWDER, FOR SOLUTION INTRAMUSCULAR; INTRAVENOUS at 18:00

## 2020-01-01 RX ADMIN — Medication 40 MILLIGRAM(S): at 17:49

## 2020-01-01 RX ADMIN — Medication 216 GRAM(S): at 16:44

## 2020-01-01 RX ADMIN — Medication 650 MILLIGRAM(S): at 11:47

## 2020-01-01 RX ADMIN — Medication 650 MILLIGRAM(S): at 03:56

## 2020-01-01 RX ADMIN — LIDOCAINE 1 PATCH: 4 CREAM TOPICAL at 16:24

## 2020-01-01 RX ADMIN — Medication 650 MILLIGRAM(S): at 22:39

## 2020-01-01 RX ADMIN — Medication 3 MILLILITER(S): at 21:22

## 2020-01-01 RX ADMIN — AMIODARONE HYDROCHLORIDE 200 MILLIGRAM(S): 400 TABLET ORAL at 06:07

## 2020-01-01 RX ADMIN — Medication 40 MILLIGRAM(S): at 05:55

## 2020-01-01 RX ADMIN — LIDOCAINE 1 PATCH: 4 CREAM TOPICAL at 06:27

## 2020-01-01 RX ADMIN — CEFTRIAXONE 100 MILLIGRAM(S): 500 INJECTION, POWDER, FOR SOLUTION INTRAMUSCULAR; INTRAVENOUS at 05:23

## 2020-01-01 RX ADMIN — MORPHINE SULFATE 1 MILLIGRAM(S): 50 CAPSULE, EXTENDED RELEASE ORAL at 10:39

## 2020-01-01 RX ADMIN — Medication 40 MILLIGRAM(S): at 17:26

## 2020-01-01 RX ADMIN — BUDESONIDE AND FORMOTEROL FUMARATE DIHYDRATE 2 PUFF(S): 160; 4.5 AEROSOL RESPIRATORY (INHALATION) at 21:42

## 2020-01-01 RX ADMIN — Medication 500 MICROGRAM(S): at 09:38

## 2020-01-01 RX ADMIN — LEVALBUTEROL 0.63 MILLIGRAM(S): 1.25 SOLUTION, CONCENTRATE RESPIRATORY (INHALATION) at 00:32

## 2020-01-01 RX ADMIN — Medication 100 MICROGRAM(S): at 05:02

## 2020-01-01 RX ADMIN — Medication 216 GRAM(S): at 00:27

## 2020-01-01 RX ADMIN — BUDESONIDE AND FORMOTEROL FUMARATE DIHYDRATE 2 PUFF(S): 160; 4.5 AEROSOL RESPIRATORY (INHALATION) at 21:13

## 2020-01-01 RX ADMIN — AMLODIPINE BESYLATE 10 MILLIGRAM(S): 2.5 TABLET ORAL at 05:52

## 2020-01-01 RX ADMIN — Medication 216 GRAM(S): at 12:10

## 2020-01-01 RX ADMIN — LEVALBUTEROL 0.63 MILLIGRAM(S): 1.25 SOLUTION, CONCENTRATE RESPIRATORY (INHALATION) at 09:44

## 2020-01-01 RX ADMIN — Medication 650 MILLIGRAM(S): at 15:30

## 2020-01-01 RX ADMIN — ALBUTEROL 2 PUFF(S): 90 AEROSOL, METERED ORAL at 22:09

## 2020-01-01 RX ADMIN — Medication 3 MILLILITER(S): at 05:07

## 2020-01-01 RX ADMIN — PANTOPRAZOLE SODIUM 40 MILLIGRAM(S): 20 TABLET, DELAYED RELEASE ORAL at 05:27

## 2020-01-01 RX ADMIN — Medication 500 MICROGRAM(S): at 15:45

## 2020-01-01 RX ADMIN — ENOXAPARIN SODIUM 60 MILLIGRAM(S): 100 INJECTION SUBCUTANEOUS at 19:00

## 2020-01-01 RX ADMIN — Medication 50 MILLILITER(S): at 17:48

## 2020-01-01 RX ADMIN — Medication 100 MICROGRAM(S): at 05:19

## 2020-01-01 RX ADMIN — CEFTRIAXONE 100 MILLIGRAM(S): 500 INJECTION, POWDER, FOR SOLUTION INTRAMUSCULAR; INTRAVENOUS at 17:20

## 2020-01-01 RX ADMIN — Medication 3 MILLILITER(S): at 04:47

## 2020-01-01 RX ADMIN — CEFTRIAXONE 100 MILLIGRAM(S): 500 INJECTION, POWDER, FOR SOLUTION INTRAMUSCULAR; INTRAVENOUS at 06:09

## 2020-01-01 RX ADMIN — SENNA PLUS 2 TABLET(S): 8.6 TABLET ORAL at 20:51

## 2020-01-01 RX ADMIN — BUDESONIDE AND FORMOTEROL FUMARATE DIHYDRATE 2 PUFF(S): 160; 4.5 AEROSOL RESPIRATORY (INHALATION) at 08:23

## 2020-01-01 RX ADMIN — Medication 100 MICROGRAM(S): at 05:23

## 2020-01-01 RX ADMIN — TAMSULOSIN HYDROCHLORIDE 0.4 MILLIGRAM(S): 0.4 CAPSULE ORAL at 06:10

## 2020-01-01 RX ADMIN — LEVALBUTEROL 0.63 MILLIGRAM(S): 1.25 SOLUTION, CONCENTRATE RESPIRATORY (INHALATION) at 15:22

## 2020-01-01 RX ADMIN — Medication 1 PUFF(S): at 05:39

## 2020-01-01 RX ADMIN — ENOXAPARIN SODIUM 40 MILLIGRAM(S): 100 INJECTION SUBCUTANEOUS at 11:48

## 2020-01-01 RX ADMIN — TAMSULOSIN HYDROCHLORIDE 0.4 MILLIGRAM(S): 0.4 CAPSULE ORAL at 05:18

## 2020-01-01 RX ADMIN — Medication 40 MILLIGRAM(S): at 05:41

## 2020-01-01 RX ADMIN — MORPHINE SULFATE 1 MILLIGRAM(S): 50 CAPSULE, EXTENDED RELEASE ORAL at 10:24

## 2020-01-01 RX ADMIN — LIDOCAINE 1 PATCH: 4 CREAM TOPICAL at 23:07

## 2020-01-01 RX ADMIN — BUDESONIDE AND FORMOTEROL FUMARATE DIHYDRATE 2 PUFF(S): 160; 4.5 AEROSOL RESPIRATORY (INHALATION) at 08:00

## 2020-01-01 RX ADMIN — Medication 250 MILLIGRAM(S): at 11:16

## 2020-01-01 RX ADMIN — MORPHINE SULFATE 2 MILLIGRAM(S): 50 CAPSULE, EXTENDED RELEASE ORAL at 23:00

## 2020-01-01 RX ADMIN — TAMSULOSIN HYDROCHLORIDE 0.4 MILLIGRAM(S): 0.4 CAPSULE ORAL at 05:52

## 2020-01-01 RX ADMIN — ALBUTEROL 2 PUFF(S): 90 AEROSOL, METERED ORAL at 09:32

## 2020-01-01 RX ADMIN — CEFTRIAXONE 100 MILLIGRAM(S): 500 INJECTION, POWDER, FOR SOLUTION INTRAMUSCULAR; INTRAVENOUS at 02:09

## 2020-01-01 RX ADMIN — Medication 5 MILLILITER(S): at 05:58

## 2020-01-01 RX ADMIN — PANTOPRAZOLE SODIUM 40 MILLIGRAM(S): 20 TABLET, DELAYED RELEASE ORAL at 12:11

## 2020-01-01 RX ADMIN — Medication 40 MILLIGRAM(S): at 17:11

## 2020-01-01 RX ADMIN — TAMSULOSIN HYDROCHLORIDE 0.4 MILLIGRAM(S): 0.4 CAPSULE ORAL at 05:19

## 2020-01-01 RX ADMIN — ENOXAPARIN SODIUM 40 MILLIGRAM(S): 100 INJECTION SUBCUTANEOUS at 13:49

## 2020-01-01 RX ADMIN — Medication 40 MILLIGRAM(S): at 05:01

## 2020-01-01 RX ADMIN — PANTOPRAZOLE SODIUM 40 MILLIGRAM(S): 20 TABLET, DELAYED RELEASE ORAL at 06:08

## 2020-01-01 RX ADMIN — Medication 1 PUFF(S): at 22:09

## 2020-01-01 RX ADMIN — Medication 10 MILLIGRAM(S): at 05:09

## 2020-01-01 RX ADMIN — TAMSULOSIN HYDROCHLORIDE 0.4 MILLIGRAM(S): 0.4 CAPSULE ORAL at 17:53

## 2020-01-01 RX ADMIN — Medication 100 MICROGRAM(S): at 05:08

## 2020-01-01 RX ADMIN — BUDESONIDE AND FORMOTEROL FUMARATE DIHYDRATE 2 PUFF(S): 160; 4.5 AEROSOL RESPIRATORY (INHALATION) at 21:02

## 2020-01-01 RX ADMIN — TAMSULOSIN HYDROCHLORIDE 0.4 MILLIGRAM(S): 0.4 CAPSULE ORAL at 05:01

## 2020-01-01 RX ADMIN — AMLODIPINE BESYLATE 10 MILLIGRAM(S): 2.5 TABLET ORAL at 05:04

## 2020-01-01 RX ADMIN — CHLORHEXIDINE GLUCONATE 1 APPLICATION(S): 213 SOLUTION TOPICAL at 05:10

## 2020-01-01 RX ADMIN — BUDESONIDE AND FORMOTEROL FUMARATE DIHYDRATE 2 PUFF(S): 160; 4.5 AEROSOL RESPIRATORY (INHALATION) at 08:26

## 2020-01-01 RX ADMIN — Medication 3 MILLILITER(S): at 08:13

## 2020-01-01 RX ADMIN — Medication 216 GRAM(S): at 12:14

## 2020-01-01 RX ADMIN — Medication 40 MILLIEQUIVALENT(S): at 22:40

## 2020-01-01 RX ADMIN — CHLORHEXIDINE GLUCONATE 1 APPLICATION(S): 213 SOLUTION TOPICAL at 05:52

## 2020-01-01 RX ADMIN — AMLODIPINE BESYLATE 10 MILLIGRAM(S): 2.5 TABLET ORAL at 06:23

## 2020-01-01 RX ADMIN — AMLODIPINE BESYLATE 10 MILLIGRAM(S): 2.5 TABLET ORAL at 06:08

## 2020-01-01 RX ADMIN — CEFTRIAXONE 100 MILLIGRAM(S): 500 INJECTION, POWDER, FOR SOLUTION INTRAMUSCULAR; INTRAVENOUS at 05:01

## 2020-01-01 RX ADMIN — ALBUTEROL 2 PUFF(S): 90 AEROSOL, METERED ORAL at 15:35

## 2020-01-01 RX ADMIN — Medication 216 GRAM(S): at 23:01

## 2020-01-01 RX ADMIN — Medication 100 MILLIEQUIVALENT(S): at 07:44

## 2020-01-01 RX ADMIN — Medication 40 MILLIGRAM(S): at 05:26

## 2020-01-01 RX ADMIN — ENOXAPARIN SODIUM 40 MILLIGRAM(S): 100 INJECTION SUBCUTANEOUS at 12:31

## 2020-01-01 RX ADMIN — Medication 20 MILLIGRAM(S): at 16:10

## 2020-01-01 RX ADMIN — PANTOPRAZOLE SODIUM 40 MILLIGRAM(S): 20 TABLET, DELAYED RELEASE ORAL at 06:54

## 2020-01-01 RX ADMIN — TAMSULOSIN HYDROCHLORIDE 0.4 MILLIGRAM(S): 0.4 CAPSULE ORAL at 05:27

## 2020-01-01 RX ADMIN — Medication 216 GRAM(S): at 11:48

## 2020-01-01 RX ADMIN — GABAPENTIN 100 MILLIGRAM(S): 400 CAPSULE ORAL at 21:11

## 2020-01-01 RX ADMIN — CEFTRIAXONE 1000 MILLIGRAM(S): 500 INJECTION, POWDER, FOR SOLUTION INTRAMUSCULAR; INTRAVENOUS at 02:26

## 2020-01-01 RX ADMIN — Medication 40 MILLIGRAM(S): at 05:59

## 2020-01-01 RX ADMIN — DULOXETINE HYDROCHLORIDE 30 MILLIGRAM(S): 30 CAPSULE, DELAYED RELEASE ORAL at 11:04

## 2020-01-01 RX ADMIN — Medication 10 MILLIEQUIVALENT(S): at 11:28

## 2020-01-01 RX ADMIN — Medication 10 MILLIGRAM(S): at 11:57

## 2020-01-01 RX ADMIN — Medication 40 MILLIEQUIVALENT(S): at 14:12

## 2020-01-01 RX ADMIN — Medication 50 MILLILITER(S): at 17:17

## 2020-01-01 RX ADMIN — Medication 650 MILLIGRAM(S): at 21:39

## 2020-01-01 RX ADMIN — BUDESONIDE AND FORMOTEROL FUMARATE DIHYDRATE 2 PUFF(S): 160; 4.5 AEROSOL RESPIRATORY (INHALATION) at 09:04

## 2020-01-01 RX ADMIN — Medication 10 MILLIEQUIVALENT(S): at 13:40

## 2020-01-01 RX ADMIN — MORPHINE SULFATE 2 MILLIGRAM(S): 50 CAPSULE, EXTENDED RELEASE ORAL at 23:58

## 2020-01-01 RX ADMIN — Medication 40 MILLIEQUIVALENT(S): at 15:26

## 2020-01-01 RX ADMIN — CEFTRIAXONE 100 MILLIGRAM(S): 500 INJECTION, POWDER, FOR SOLUTION INTRAMUSCULAR; INTRAVENOUS at 17:28

## 2020-01-01 RX ADMIN — CEFTRIAXONE 100 MILLIGRAM(S): 500 INJECTION, POWDER, FOR SOLUTION INTRAMUSCULAR; INTRAVENOUS at 03:50

## 2020-01-01 RX ADMIN — Medication 40 MILLIGRAM(S): at 17:48

## 2020-01-01 RX ADMIN — Medication 216 GRAM(S): at 23:07

## 2020-01-01 RX ADMIN — Medication 3 MILLILITER(S): at 16:42

## 2020-01-01 RX ADMIN — ATORVASTATIN CALCIUM 40 MILLIGRAM(S): 80 TABLET, FILM COATED ORAL at 21:59

## 2020-01-01 RX ADMIN — AMIODARONE HYDROCHLORIDE 200 MILLIGRAM(S): 400 TABLET ORAL at 05:19

## 2020-01-01 RX ADMIN — LIDOCAINE 1 PATCH: 4 CREAM TOPICAL at 12:11

## 2020-01-01 RX ADMIN — Medication 40 MILLIGRAM(S): at 06:54

## 2020-01-01 RX ADMIN — LIDOCAINE 1 PATCH: 4 CREAM TOPICAL at 05:30

## 2020-01-01 RX ADMIN — Medication 40 MILLIGRAM(S): at 06:09

## 2020-01-01 RX ADMIN — Medication 216 GRAM(S): at 05:45

## 2020-01-01 RX ADMIN — Medication 100 MILLIEQUIVALENT(S): at 08:56

## 2020-01-01 RX ADMIN — Medication 216 GRAM(S): at 12:09

## 2020-01-01 RX ADMIN — Medication 40 MILLIEQUIVALENT(S): at 17:52

## 2020-01-01 RX ADMIN — Medication 40 MILLIGRAM(S): at 16:44

## 2020-01-01 RX ADMIN — Medication 2 MILLILITER(S): at 08:23

## 2020-01-01 RX ADMIN — Medication 25 MILLIGRAM(S): at 06:54

## 2020-01-01 RX ADMIN — AMLODIPINE BESYLATE 10 MILLIGRAM(S): 2.5 TABLET ORAL at 05:19

## 2020-01-01 RX ADMIN — Medication 650 MILLIGRAM(S): at 16:45

## 2020-01-01 RX ADMIN — LEVALBUTEROL 0.63 MILLIGRAM(S): 1.25 SOLUTION, CONCENTRATE RESPIRATORY (INHALATION) at 08:26

## 2020-01-01 RX ADMIN — GABAPENTIN 100 MILLIGRAM(S): 400 CAPSULE ORAL at 21:59

## 2020-01-01 RX ADMIN — AMIODARONE HYDROCHLORIDE 200 MILLIGRAM(S): 400 TABLET ORAL at 07:46

## 2020-01-01 RX ADMIN — Medication 81 MILLIGRAM(S): at 13:40

## 2020-01-01 RX ADMIN — PANTOPRAZOLE SODIUM 40 MILLIGRAM(S): 20 TABLET, DELAYED RELEASE ORAL at 07:45

## 2020-01-01 RX ADMIN — Medication 650 MILLIGRAM(S): at 20:30

## 2020-01-01 RX ADMIN — CHLORHEXIDINE GLUCONATE 1 APPLICATION(S): 213 SOLUTION TOPICAL at 06:06

## 2020-01-01 RX ADMIN — Medication 100 MICROGRAM(S): at 06:05

## 2020-01-01 RX ADMIN — AMIODARONE HYDROCHLORIDE 200 MILLIGRAM(S): 400 TABLET ORAL at 05:08

## 2020-01-01 RX ADMIN — TAMSULOSIN HYDROCHLORIDE 0.4 MILLIGRAM(S): 0.4 CAPSULE ORAL at 21:18

## 2020-01-01 RX ADMIN — AMIODARONE HYDROCHLORIDE 200 MILLIGRAM(S): 400 TABLET ORAL at 06:09

## 2020-01-01 RX ADMIN — Medication 3 MILLILITER(S): at 13:00

## 2020-01-01 RX ADMIN — BUDESONIDE AND FORMOTEROL FUMARATE DIHYDRATE 2 PUFF(S): 160; 4.5 AEROSOL RESPIRATORY (INHALATION) at 08:18

## 2020-01-01 RX ADMIN — FAMOTIDINE 20 MILLIGRAM(S): 10 INJECTION INTRAVENOUS at 20:08

## 2020-01-01 RX ADMIN — Medication 3 MILLILITER(S): at 01:21

## 2020-01-01 RX ADMIN — AMLODIPINE BESYLATE 10 MILLIGRAM(S): 2.5 TABLET ORAL at 06:09

## 2020-01-01 RX ADMIN — PANTOPRAZOLE SODIUM 40 MILLIGRAM(S): 20 TABLET, DELAYED RELEASE ORAL at 05:08

## 2020-01-01 RX ADMIN — TAMSULOSIN HYDROCHLORIDE 0.4 MILLIGRAM(S): 0.4 CAPSULE ORAL at 07:45

## 2020-01-01 RX ADMIN — ENOXAPARIN SODIUM 60 MILLIGRAM(S): 100 INJECTION SUBCUTANEOUS at 05:47

## 2020-01-01 RX ADMIN — Medication 2 MILLILITER(S): at 16:42

## 2020-01-01 RX ADMIN — Medication 25 MILLIGRAM(S): at 17:52

## 2020-01-01 RX ADMIN — Medication 2 PUFF(S): at 15:35

## 2020-01-01 RX ADMIN — ENOXAPARIN SODIUM 40 MILLIGRAM(S): 100 INJECTION SUBCUTANEOUS at 11:16

## 2020-01-01 RX ADMIN — SENNA PLUS 2 TABLET(S): 8.6 TABLET ORAL at 21:10

## 2020-01-01 RX ADMIN — LIDOCAINE 1 PATCH: 4 CREAM TOPICAL at 12:34

## 2020-01-01 RX ADMIN — Medication 100 MILLIEQUIVALENT(S): at 06:37

## 2020-01-01 RX ADMIN — BUDESONIDE AND FORMOTEROL FUMARATE DIHYDRATE 2 PUFF(S): 160; 4.5 AEROSOL RESPIRATORY (INHALATION) at 08:16

## 2020-01-01 RX ADMIN — BUDESONIDE AND FORMOTEROL FUMARATE DIHYDRATE 2 PUFF(S): 160; 4.5 AEROSOL RESPIRATORY (INHALATION) at 08:13

## 2020-01-01 RX ADMIN — AMIODARONE HYDROCHLORIDE 200 MILLIGRAM(S): 400 TABLET ORAL at 06:54

## 2020-01-01 RX ADMIN — SODIUM CHLORIDE 1000 MILLILITER(S): 9 INJECTION INTRAMUSCULAR; INTRAVENOUS; SUBCUTANEOUS at 03:29

## 2020-01-01 RX ADMIN — LIDOCAINE 1 PATCH: 4 CREAM TOPICAL at 19:51

## 2020-01-01 RX ADMIN — Medication 3 MILLILITER(S): at 16:32

## 2020-01-01 RX ADMIN — Medication 10 MILLIGRAM(S): at 12:12

## 2020-01-01 RX ADMIN — SENNA PLUS 2 TABLET(S): 8.6 TABLET ORAL at 22:40

## 2020-01-01 RX ADMIN — Medication 10 MILLIGRAM(S): at 12:00

## 2020-01-01 RX ADMIN — GABAPENTIN 100 MILLIGRAM(S): 400 CAPSULE ORAL at 20:51

## 2020-01-01 RX ADMIN — BUDESONIDE AND FORMOTEROL FUMARATE DIHYDRATE 2 PUFF(S): 160; 4.5 AEROSOL RESPIRATORY (INHALATION) at 08:59

## 2020-01-01 RX ADMIN — Medication 81 MILLIGRAM(S): at 11:28

## 2020-01-01 RX ADMIN — Medication 3 MILLILITER(S): at 08:21

## 2020-01-01 RX ADMIN — BUDESONIDE AND FORMOTEROL FUMARATE DIHYDRATE 2 PUFF(S): 160; 4.5 AEROSOL RESPIRATORY (INHALATION) at 09:23

## 2020-01-01 RX ADMIN — Medication 0.25 MILLIGRAM(S): at 00:38

## 2020-01-01 RX ADMIN — ATORVASTATIN CALCIUM 40 MILLIGRAM(S): 80 TABLET, FILM COATED ORAL at 23:20

## 2020-01-01 RX ADMIN — Medication 3 MILLILITER(S): at 01:28

## 2020-01-01 RX ADMIN — Medication 250 MILLIGRAM(S): at 12:31

## 2020-01-01 RX ADMIN — Medication 216 GRAM(S): at 00:12

## 2020-01-01 RX ADMIN — Medication 650 MILLIGRAM(S): at 14:47

## 2020-01-01 RX ADMIN — Medication 650 MILLIGRAM(S): at 14:45

## 2020-01-01 RX ADMIN — Medication 216 GRAM(S): at 05:46

## 2020-01-01 RX ADMIN — LIDOCAINE 1 PATCH: 4 CREAM TOPICAL at 20:00

## 2020-01-01 RX ADMIN — Medication 50 MILLILITER(S): at 00:13

## 2020-01-01 RX ADMIN — BUDESONIDE AND FORMOTEROL FUMARATE DIHYDRATE 2 PUFF(S): 160; 4.5 AEROSOL RESPIRATORY (INHALATION) at 21:33

## 2020-01-01 RX ADMIN — Medication 100 MICROGRAM(S): at 05:52

## 2020-01-01 RX ADMIN — Medication 216 GRAM(S): at 17:37

## 2020-01-01 RX ADMIN — TAMSULOSIN HYDROCHLORIDE 0.4 MILLIGRAM(S): 0.4 CAPSULE ORAL at 17:11

## 2020-01-01 RX ADMIN — CEFTRIAXONE 100 MILLIGRAM(S): 500 INJECTION, POWDER, FOR SOLUTION INTRAMUSCULAR; INTRAVENOUS at 17:10

## 2020-01-01 RX ADMIN — Medication 216 GRAM(S): at 05:01

## 2020-01-01 RX ADMIN — Medication 100 MICROGRAM(S): at 06:08

## 2020-01-01 RX ADMIN — TAMSULOSIN HYDROCHLORIDE 0.4 MILLIGRAM(S): 0.4 CAPSULE ORAL at 20:51

## 2020-01-01 RX ADMIN — SENNA PLUS 2 TABLET(S): 8.6 TABLET ORAL at 21:05

## 2020-01-01 RX ADMIN — Medication 50 MILLILITER(S): at 06:05

## 2020-01-01 RX ADMIN — Medication 3 MILLILITER(S): at 04:46

## 2020-01-01 RX ADMIN — BUDESONIDE AND FORMOTEROL FUMARATE DIHYDRATE 2 PUFF(S): 160; 4.5 AEROSOL RESPIRATORY (INHALATION) at 08:42

## 2020-01-01 RX ADMIN — Medication 216 GRAM(S): at 05:51

## 2020-01-01 RX ADMIN — Medication 650 MILLIGRAM(S): at 20:34

## 2020-01-01 RX ADMIN — SODIUM CHLORIDE 1500 MILLILITER(S): 9 INJECTION INTRAMUSCULAR; INTRAVENOUS; SUBCUTANEOUS at 22:52

## 2020-01-01 RX ADMIN — Medication 650 MILLIGRAM(S): at 19:42

## 2020-01-01 RX ADMIN — PANTOPRAZOLE SODIUM 40 MILLIGRAM(S): 20 TABLET, DELAYED RELEASE ORAL at 05:59

## 2020-01-01 RX ADMIN — ENOXAPARIN SODIUM 40 MILLIGRAM(S): 100 INJECTION SUBCUTANEOUS at 12:09

## 2020-01-01 RX ADMIN — Medication 0.25 MILLIGRAM(S): at 22:01

## 2020-01-01 RX ADMIN — TAMSULOSIN HYDROCHLORIDE 0.4 MILLIGRAM(S): 0.4 CAPSULE ORAL at 17:32

## 2020-01-01 RX ADMIN — Medication 40 MILLIGRAM(S): at 05:18

## 2020-01-01 RX ADMIN — ATORVASTATIN CALCIUM 40 MILLIGRAM(S): 80 TABLET, FILM COATED ORAL at 21:11

## 2020-01-01 RX ADMIN — AMLODIPINE BESYLATE 10 MILLIGRAM(S): 2.5 TABLET ORAL at 05:03

## 2020-01-01 RX ADMIN — AZITHROMYCIN 255 MILLIGRAM(S): 500 TABLET, FILM COATED ORAL at 23:03

## 2020-01-01 RX ADMIN — SODIUM CHLORIDE 1500 MILLILITER(S): 9 INJECTION INTRAMUSCULAR; INTRAVENOUS; SUBCUTANEOUS at 21:09

## 2020-01-01 RX ADMIN — TAMSULOSIN HYDROCHLORIDE 0.4 MILLIGRAM(S): 0.4 CAPSULE ORAL at 06:04

## 2020-01-01 RX ADMIN — Medication 650 MILLIGRAM(S): at 12:17

## 2020-01-01 RX ADMIN — Medication 1 PUFF(S): at 08:16

## 2020-01-01 RX ADMIN — LEVALBUTEROL 0.63 MILLIGRAM(S): 1.25 SOLUTION, CONCENTRATE RESPIRATORY (INHALATION) at 16:18

## 2020-01-01 RX ADMIN — Medication 216 GRAM(S): at 06:09

## 2020-01-01 RX ADMIN — CEFTRIAXONE 100 MILLIGRAM(S): 500 INJECTION, POWDER, FOR SOLUTION INTRAMUSCULAR; INTRAVENOUS at 16:58

## 2020-01-01 RX ADMIN — Medication 2 MILLILITER(S): at 13:00

## 2020-01-01 RX ADMIN — Medication 216 GRAM(S): at 23:28

## 2020-01-01 RX ADMIN — Medication 100 MICROGRAM(S): at 05:18

## 2020-01-01 RX ADMIN — LIDOCAINE 1 PATCH: 4 CREAM TOPICAL at 19:26

## 2020-01-01 RX ADMIN — LEVALBUTEROL 0.63 MILLIGRAM(S): 1.25 SOLUTION, CONCENTRATE RESPIRATORY (INHALATION) at 05:41

## 2020-01-01 RX ADMIN — Medication 5 MILLILITER(S): at 17:12

## 2020-01-01 RX ADMIN — Medication 100 MILLIEQUIVALENT(S): at 12:50

## 2020-01-01 RX ADMIN — GABAPENTIN 100 MILLIGRAM(S): 400 CAPSULE ORAL at 21:18

## 2020-01-01 RX ADMIN — Medication 216 GRAM(S): at 11:59

## 2020-01-01 RX ADMIN — AMIODARONE HYDROCHLORIDE 200 MILLIGRAM(S): 400 TABLET ORAL at 05:02

## 2020-01-01 RX ADMIN — AMLODIPINE BESYLATE 10 MILLIGRAM(S): 2.5 TABLET ORAL at 05:23

## 2020-01-01 RX ADMIN — Medication 500 MICROGRAM(S): at 00:28

## 2020-01-01 RX ADMIN — Medication 216 GRAM(S): at 11:57

## 2020-01-01 RX ADMIN — BUDESONIDE AND FORMOTEROL FUMARATE DIHYDRATE 2 PUFF(S): 160; 4.5 AEROSOL RESPIRATORY (INHALATION) at 22:07

## 2020-01-01 RX ADMIN — POTASSIUM PHOSPHATE, MONOBASIC POTASSIUM PHOSPHATE, DIBASIC 62.5 MILLIMOLE(S): 236; 224 INJECTION, SOLUTION INTRAVENOUS at 08:50

## 2020-01-01 RX ADMIN — Medication 40 MILLIGRAM(S): at 16:58

## 2020-01-01 RX ADMIN — Medication 100 MILLIEQUIVALENT(S): at 14:39

## 2020-01-01 RX ADMIN — AZITHROMYCIN 255 MILLIGRAM(S): 500 TABLET, FILM COATED ORAL at 22:36

## 2020-01-01 RX ADMIN — TAMSULOSIN HYDROCHLORIDE 0.4 MILLIGRAM(S): 0.4 CAPSULE ORAL at 16:58

## 2020-01-01 RX ADMIN — Medication 25 MILLIGRAM(S): at 18:28

## 2020-01-01 RX ADMIN — Medication 3 MILLILITER(S): at 08:18

## 2020-01-01 RX ADMIN — LIDOCAINE 1 PATCH: 4 CREAM TOPICAL at 23:42

## 2020-01-01 RX ADMIN — SENNA PLUS 2 TABLET(S): 8.6 TABLET ORAL at 21:59

## 2020-01-01 RX ADMIN — Medication 50 MILLILITER(S): at 23:03

## 2020-01-01 RX ADMIN — Medication 216 GRAM(S): at 12:32

## 2020-01-01 RX ADMIN — Medication 20 MILLIGRAM(S): at 22:35

## 2020-01-01 RX ADMIN — Medication 40 MILLIGRAM(S): at 09:44

## 2020-01-01 RX ADMIN — Medication 216 GRAM(S): at 23:03

## 2020-01-01 RX ADMIN — TAMSULOSIN HYDROCHLORIDE 0.4 MILLIGRAM(S): 0.4 CAPSULE ORAL at 16:44

## 2020-01-01 RX ADMIN — Medication 10 MILLIEQUIVALENT(S): at 11:13

## 2020-01-01 RX ADMIN — ALBUTEROL 2 PUFF(S): 90 AEROSOL, METERED ORAL at 21:07

## 2020-01-01 RX ADMIN — Medication 3 MILLILITER(S): at 08:56

## 2020-01-01 RX ADMIN — Medication 1 PUFF(S): at 21:41

## 2020-01-01 RX ADMIN — Medication 100 MICROGRAM(S): at 05:59

## 2020-01-01 RX ADMIN — CEFTRIAXONE 100 MILLIGRAM(S): 500 INJECTION, POWDER, FOR SOLUTION INTRAMUSCULAR; INTRAVENOUS at 16:44

## 2020-01-01 RX ADMIN — Medication 40 MILLIGRAM(S): at 06:07

## 2020-01-01 RX ADMIN — Medication 216 GRAM(S): at 00:07

## 2020-01-01 RX ADMIN — AMIODARONE HYDROCHLORIDE 200 MILLIGRAM(S): 400 TABLET ORAL at 05:59

## 2020-01-01 RX ADMIN — SENNA PLUS 2 TABLET(S): 8.6 TABLET ORAL at 21:11

## 2020-01-01 RX ADMIN — Medication 100 MILLIEQUIVALENT(S): at 13:21

## 2020-01-01 RX ADMIN — AMLODIPINE BESYLATE 10 MILLIGRAM(S): 2.5 TABLET ORAL at 05:41

## 2020-01-01 RX ADMIN — Medication 650 MILLIGRAM(S): at 20:08

## 2020-01-01 RX ADMIN — Medication 216 GRAM(S): at 19:20

## 2020-01-01 RX ADMIN — LEVALBUTEROL 0.63 MILLIGRAM(S): 1.25 SOLUTION, CONCENTRATE RESPIRATORY (INHALATION) at 06:01

## 2020-01-01 RX ADMIN — LIDOCAINE 1 PATCH: 4 CREAM TOPICAL at 00:30

## 2020-01-01 RX ADMIN — ATORVASTATIN CALCIUM 40 MILLIGRAM(S): 80 TABLET, FILM COATED ORAL at 21:18

## 2020-01-01 RX ADMIN — Medication 25 MILLIGRAM(S): at 05:59

## 2020-01-01 RX ADMIN — CEFTRIAXONE 100 MILLIGRAM(S): 500 INJECTION, POWDER, FOR SOLUTION INTRAMUSCULAR; INTRAVENOUS at 05:51

## 2020-01-01 RX ADMIN — BUDESONIDE AND FORMOTEROL FUMARATE DIHYDRATE 2 PUFF(S): 160; 4.5 AEROSOL RESPIRATORY (INHALATION) at 21:40

## 2020-01-01 RX ADMIN — Medication 216 GRAM(S): at 11:34

## 2020-01-01 RX ADMIN — Medication 25 GRAM(S): at 08:35

## 2020-01-01 RX ADMIN — Medication 100 MILLIEQUIVALENT(S): at 14:14

## 2020-01-01 RX ADMIN — GABAPENTIN 100 MILLIGRAM(S): 400 CAPSULE ORAL at 23:20

## 2020-01-01 RX ADMIN — ENOXAPARIN SODIUM 60 MILLIGRAM(S): 100 INJECTION SUBCUTANEOUS at 05:23

## 2020-01-01 RX ADMIN — Medication 0.25 MILLIGRAM(S): at 22:00

## 2020-01-01 RX ADMIN — ALBUTEROL 2 PUFF(S): 90 AEROSOL, METERED ORAL at 21:32

## 2020-01-01 RX ADMIN — Medication 3 MILLILITER(S): at 21:24

## 2020-01-01 RX ADMIN — BUDESONIDE AND FORMOTEROL FUMARATE DIHYDRATE 2 PUFF(S): 160; 4.5 AEROSOL RESPIRATORY (INHALATION) at 21:24

## 2020-01-01 RX ADMIN — TAMSULOSIN HYDROCHLORIDE 0.4 MILLIGRAM(S): 0.4 CAPSULE ORAL at 06:38

## 2020-01-01 RX ADMIN — Medication 40 MILLIGRAM(S): at 06:04

## 2020-01-01 RX ADMIN — Medication 216 GRAM(S): at 17:20

## 2020-01-01 RX ADMIN — Medication 40 MILLIGRAM(S): at 17:37

## 2020-01-01 RX ADMIN — Medication 3 MILLILITER(S): at 12:26

## 2020-01-01 RX ADMIN — Medication 25 MILLIGRAM(S): at 18:05

## 2020-01-01 RX ADMIN — Medication 216 GRAM(S): at 17:32

## 2020-01-01 RX ADMIN — Medication 4 MILLILITER(S): at 01:28

## 2020-01-01 RX ADMIN — Medication 50 GRAM(S): at 20:48

## 2020-01-01 RX ADMIN — Medication 40 MILLIGRAM(S): at 05:23

## 2020-01-01 RX ADMIN — CEFEPIME 1000 MILLIGRAM(S): 1 INJECTION, POWDER, FOR SOLUTION INTRAMUSCULAR; INTRAVENOUS at 21:14

## 2020-01-01 RX ADMIN — LIDOCAINE 1 PATCH: 4 CREAM TOPICAL at 12:24

## 2020-01-01 RX ADMIN — AMIODARONE HYDROCHLORIDE 200 MILLIGRAM(S): 400 TABLET ORAL at 05:23

## 2020-01-01 RX ADMIN — Medication 216 GRAM(S): at 23:05

## 2020-01-01 RX ADMIN — BUDESONIDE AND FORMOTEROL FUMARATE DIHYDRATE 2 PUFF(S): 160; 4.5 AEROSOL RESPIRATORY (INHALATION) at 21:41

## 2020-01-01 RX ADMIN — LIDOCAINE 1 PATCH: 4 CREAM TOPICAL at 19:45

## 2020-01-01 RX ADMIN — Medication 0.25 MILLIGRAM(S): at 09:37

## 2020-01-01 RX ADMIN — Medication 2 PUFF(S): at 21:33

## 2020-01-01 RX ADMIN — TAMSULOSIN HYDROCHLORIDE 0.4 MILLIGRAM(S): 0.4 CAPSULE ORAL at 17:23

## 2020-01-01 RX ADMIN — Medication 50 MILLILITER(S): at 08:48

## 2020-01-01 RX ADMIN — LIDOCAINE 1 PATCH: 4 CREAM TOPICAL at 12:14

## 2020-01-01 RX ADMIN — Medication 0.25 MILLIGRAM(S): at 22:19

## 2020-01-01 RX ADMIN — TAMSULOSIN HYDROCHLORIDE 0.4 MILLIGRAM(S): 0.4 CAPSULE ORAL at 23:20

## 2020-01-01 RX ADMIN — Medication 20 MILLIGRAM(S): at 05:06

## 2020-01-01 RX ADMIN — ENOXAPARIN SODIUM 60 MILLIGRAM(S): 100 INJECTION SUBCUTANEOUS at 05:01

## 2020-01-01 RX ADMIN — MAGNESIUM HYDROXIDE 30 MILLILITER(S): 400 TABLET, CHEWABLE ORAL at 21:59

## 2020-01-01 RX ADMIN — Medication 100 MILLIEQUIVALENT(S): at 11:21

## 2020-01-01 RX ADMIN — Medication 3 MILLILITER(S): at 00:14

## 2020-01-01 RX ADMIN — ALBUTEROL 2 PUFF(S): 90 AEROSOL, METERED ORAL at 08:45

## 2020-01-01 RX ADMIN — Medication 3 MILLILITER(S): at 12:59

## 2020-01-01 RX ADMIN — ALBUTEROL 2 PUFF(S): 90 AEROSOL, METERED ORAL at 08:18

## 2020-01-01 RX ADMIN — Medication 650 MILLIGRAM(S): at 16:19

## 2020-01-01 RX ADMIN — Medication 10 MILLIEQUIVALENT(S): at 11:04

## 2020-01-01 RX ADMIN — BUDESONIDE AND FORMOTEROL FUMARATE DIHYDRATE 2 PUFF(S): 160; 4.5 AEROSOL RESPIRATORY (INHALATION) at 21:11

## 2020-01-01 RX ADMIN — Medication 216 GRAM(S): at 12:16

## 2020-01-01 RX ADMIN — Medication 25 MILLIGRAM(S): at 17:53

## 2020-01-01 RX ADMIN — LIDOCAINE 1 PATCH: 4 CREAM TOPICAL at 23:56

## 2020-01-01 RX ADMIN — ENOXAPARIN SODIUM 60 MILLIGRAM(S): 100 INJECTION SUBCUTANEOUS at 17:10

## 2020-01-01 RX ADMIN — Medication 20 MILLIGRAM(S): at 05:18

## 2020-01-01 RX ADMIN — AMLODIPINE BESYLATE 10 MILLIGRAM(S): 2.5 TABLET ORAL at 05:01

## 2020-01-01 RX ADMIN — CEFTRIAXONE 100 MILLIGRAM(S): 500 INJECTION, POWDER, FOR SOLUTION INTRAMUSCULAR; INTRAVENOUS at 05:42

## 2020-01-01 RX ADMIN — BUDESONIDE AND FORMOTEROL FUMARATE DIHYDRATE 2 PUFF(S): 160; 4.5 AEROSOL RESPIRATORY (INHALATION) at 08:17

## 2020-01-01 RX ADMIN — Medication 81 MILLIGRAM(S): at 11:04

## 2020-01-01 RX ADMIN — TAMSULOSIN HYDROCHLORIDE 0.4 MILLIGRAM(S): 0.4 CAPSULE ORAL at 21:40

## 2020-01-01 RX ADMIN — BUDESONIDE AND FORMOTEROL FUMARATE DIHYDRATE 2 PUFF(S): 160; 4.5 AEROSOL RESPIRATORY (INHALATION) at 21:50

## 2020-01-01 RX ADMIN — BUDESONIDE AND FORMOTEROL FUMARATE DIHYDRATE 2 PUFF(S): 160; 4.5 AEROSOL RESPIRATORY (INHALATION) at 09:43

## 2020-01-01 RX ADMIN — Medication 100 MICROGRAM(S): at 05:06

## 2020-01-01 RX ADMIN — PANTOPRAZOLE SODIUM 40 MILLIGRAM(S): 20 TABLET, DELAYED RELEASE ORAL at 05:20

## 2020-01-01 RX ADMIN — Medication 650 MILLIGRAM(S): at 06:30

## 2020-01-01 RX ADMIN — ENOXAPARIN SODIUM 40 MILLIGRAM(S): 100 INJECTION SUBCUTANEOUS at 12:14

## 2020-01-01 RX ADMIN — Medication 40 MILLIEQUIVALENT(S): at 14:30

## 2020-01-01 RX ADMIN — LIDOCAINE 1 PATCH: 4 CREAM TOPICAL at 21:16

## 2020-01-01 RX ADMIN — AZITHROMYCIN 255 MILLIGRAM(S): 500 TABLET, FILM COATED ORAL at 00:58

## 2020-01-01 RX ADMIN — CEFEPIME 100 MILLIGRAM(S): 1 INJECTION, POWDER, FOR SOLUTION INTRAMUSCULAR; INTRAVENOUS at 20:43

## 2020-01-01 RX ADMIN — LIDOCAINE 1 PATCH: 4 CREAM TOPICAL at 20:08

## 2020-01-01 RX ADMIN — LEVALBUTEROL 0.63 MILLIGRAM(S): 1.25 SOLUTION, CONCENTRATE RESPIRATORY (INHALATION) at 09:37

## 2020-01-01 RX ADMIN — AMIODARONE HYDROCHLORIDE 200 MILLIGRAM(S): 400 TABLET ORAL at 05:03

## 2020-01-01 RX ADMIN — CEFTRIAXONE 100 MILLIGRAM(S): 500 INJECTION, POWDER, FOR SOLUTION INTRAMUSCULAR; INTRAVENOUS at 05:18

## 2020-01-01 RX ADMIN — Medication 25 MILLIGRAM(S): at 05:03

## 2020-01-01 RX ADMIN — Medication 250 MILLIGRAM(S): at 21:14

## 2020-01-01 RX ADMIN — ALBUTEROL 2 PUFF(S): 90 AEROSOL, METERED ORAL at 09:05

## 2020-01-01 RX ADMIN — Medication 40 MILLIEQUIVALENT(S): at 12:13

## 2020-01-01 RX ADMIN — ALBUTEROL 2 PUFF(S): 90 AEROSOL, METERED ORAL at 04:58

## 2020-01-01 RX ADMIN — LIDOCAINE 1 PATCH: 4 CREAM TOPICAL at 02:00

## 2020-01-01 RX ADMIN — PANTOPRAZOLE SODIUM 40 MILLIGRAM(S): 20 TABLET, DELAYED RELEASE ORAL at 05:41

## 2020-01-01 RX ADMIN — LIDOCAINE 1 PATCH: 4 CREAM TOPICAL at 11:57

## 2020-01-01 RX ADMIN — BUDESONIDE AND FORMOTEROL FUMARATE DIHYDRATE 2 PUFF(S): 160; 4.5 AEROSOL RESPIRATORY (INHALATION) at 08:56

## 2020-01-01 RX ADMIN — Medication 100 MICROGRAM(S): at 05:46

## 2020-01-01 RX ADMIN — BUDESONIDE AND FORMOTEROL FUMARATE DIHYDRATE 2 PUFF(S): 160; 4.5 AEROSOL RESPIRATORY (INHALATION) at 21:14

## 2020-01-01 RX ADMIN — Medication 0.25 MILLIGRAM(S): at 21:45

## 2020-01-01 RX ADMIN — Medication 650 MILLIGRAM(S): at 04:00

## 2020-01-01 RX ADMIN — Medication 650 MILLIGRAM(S): at 17:15

## 2020-01-01 RX ADMIN — LIDOCAINE 1 PATCH: 4 CREAM TOPICAL at 20:34

## 2020-01-01 RX ADMIN — ALBUTEROL 2 PUFF(S): 90 AEROSOL, METERED ORAL at 22:14

## 2020-01-01 RX ADMIN — CEFTRIAXONE 100 MILLIGRAM(S): 500 INJECTION, POWDER, FOR SOLUTION INTRAMUSCULAR; INTRAVENOUS at 01:21

## 2020-01-01 RX ADMIN — Medication 40 MILLIGRAM(S): at 05:52

## 2020-01-01 RX ADMIN — TAMSULOSIN HYDROCHLORIDE 0.4 MILLIGRAM(S): 0.4 CAPSULE ORAL at 05:06

## 2020-01-01 RX ADMIN — TAMSULOSIN HYDROCHLORIDE 0.4 MILLIGRAM(S): 0.4 CAPSULE ORAL at 05:23

## 2020-01-01 RX ADMIN — Medication 40 MILLIGRAM(S): at 17:17

## 2020-01-01 RX ADMIN — TAMSULOSIN HYDROCHLORIDE 0.4 MILLIGRAM(S): 0.4 CAPSULE ORAL at 21:11

## 2020-01-01 RX ADMIN — TAMSULOSIN HYDROCHLORIDE 0.4 MILLIGRAM(S): 0.4 CAPSULE ORAL at 17:17

## 2020-01-01 RX ADMIN — BUDESONIDE AND FORMOTEROL FUMARATE DIHYDRATE 2 PUFF(S): 160; 4.5 AEROSOL RESPIRATORY (INHALATION) at 21:27

## 2020-01-01 RX ADMIN — Medication 500 MICROGRAM(S): at 06:01

## 2020-01-01 RX ADMIN — Medication 650 MILLIGRAM(S): at 05:19

## 2020-01-01 RX ADMIN — Medication 100 MICROGRAM(S): at 05:27

## 2020-01-01 RX ADMIN — PANTOPRAZOLE SODIUM 40 MILLIGRAM(S): 20 TABLET, DELAYED RELEASE ORAL at 05:23

## 2020-01-01 RX ADMIN — AZITHROMYCIN 255 MILLIGRAM(S): 500 TABLET, FILM COATED ORAL at 23:06

## 2020-01-01 RX ADMIN — PANTOPRAZOLE SODIUM 40 MILLIGRAM(S): 20 TABLET, DELAYED RELEASE ORAL at 06:16

## 2020-01-01 RX ADMIN — Medication 100 MILLIEQUIVALENT(S): at 12:33

## 2020-01-01 RX ADMIN — Medication 3 MILLILITER(S): at 04:42

## 2020-01-01 RX ADMIN — ALBUTEROL 2 PUFF(S): 90 AEROSOL, METERED ORAL at 16:43

## 2020-01-01 RX ADMIN — Medication 650 MILLIGRAM(S): at 20:09

## 2020-01-01 RX ADMIN — Medication 100 MICROGRAM(S): at 06:54

## 2020-01-01 RX ADMIN — Medication 50 GRAM(S): at 22:01

## 2020-01-01 RX ADMIN — Medication 216 GRAM(S): at 17:02

## 2020-01-01 RX ADMIN — Medication 1 PUFF(S): at 21:42

## 2020-01-01 RX ADMIN — ALBUTEROL 2 PUFF(S): 90 AEROSOL, METERED ORAL at 16:17

## 2020-01-01 RX ADMIN — LEVALBUTEROL 0.63 MILLIGRAM(S): 1.25 SOLUTION, CONCENTRATE RESPIRATORY (INHALATION) at 15:45

## 2020-01-01 RX ADMIN — CHLORHEXIDINE GLUCONATE 1 APPLICATION(S): 213 SOLUTION TOPICAL at 05:05

## 2020-01-01 RX ADMIN — Medication 10 MILLIGRAM(S): at 16:35

## 2020-01-01 RX ADMIN — ENOXAPARIN SODIUM 40 MILLIGRAM(S): 100 INJECTION SUBCUTANEOUS at 11:34

## 2020-01-01 RX ADMIN — LIDOCAINE 1 PATCH: 4 CREAM TOPICAL at 22:33

## 2020-01-01 RX ADMIN — AMIODARONE HYDROCHLORIDE 200 MILLIGRAM(S): 400 TABLET ORAL at 16:44

## 2020-01-01 RX ADMIN — Medication 216 GRAM(S): at 05:23

## 2020-01-01 RX ADMIN — Medication 100 MILLIEQUIVALENT(S): at 10:33

## 2020-01-01 RX ADMIN — Medication 100 MILLIEQUIVALENT(S): at 11:47

## 2020-01-01 RX ADMIN — Medication 100 MICROGRAM(S): at 07:46

## 2020-01-01 RX ADMIN — Medication 25 MILLIGRAM(S): at 05:08

## 2020-01-01 RX ADMIN — DULOXETINE HYDROCHLORIDE 30 MILLIGRAM(S): 30 CAPSULE, DELAYED RELEASE ORAL at 13:40

## 2020-01-01 RX ADMIN — AMIODARONE HYDROCHLORIDE 200 MILLIGRAM(S): 400 TABLET ORAL at 05:04

## 2020-01-01 RX ADMIN — AMLODIPINE BESYLATE 10 MILLIGRAM(S): 2.5 TABLET ORAL at 06:54

## 2020-01-01 RX ADMIN — Medication 216 GRAM(S): at 05:04

## 2020-01-01 RX ADMIN — SODIUM CHLORIDE 50 MILLILITER(S): 9 INJECTION, SOLUTION INTRAVENOUS at 10:00

## 2020-01-01 RX ADMIN — Medication 50 MILLILITER(S): at 12:31

## 2020-01-01 RX ADMIN — Medication 3 MILLILITER(S): at 16:12

## 2020-01-01 RX ADMIN — Medication 216 GRAM(S): at 17:10

## 2020-01-01 RX ADMIN — Medication 3 MILLILITER(S): at 21:02

## 2020-01-01 RX ADMIN — Medication 1 PUFF(S): at 15:22

## 2020-01-01 RX ADMIN — Medication 0.25 MILLIGRAM(S): at 21:05

## 2020-01-01 RX ADMIN — Medication 216 GRAM(S): at 16:59

## 2020-01-01 RX ADMIN — PANTOPRAZOLE SODIUM 40 MILLIGRAM(S): 20 TABLET, DELAYED RELEASE ORAL at 05:52

## 2020-01-01 RX ADMIN — SENNA PLUS 2 TABLET(S): 8.6 TABLET ORAL at 22:06

## 2020-01-01 RX ADMIN — Medication 216 GRAM(S): at 17:48

## 2020-01-01 RX ADMIN — Medication 40 MILLIGRAM(S): at 17:23

## 2020-01-01 RX ADMIN — PANTOPRAZOLE SODIUM 40 MILLIGRAM(S): 20 TABLET, DELAYED RELEASE ORAL at 13:51

## 2020-01-01 RX ADMIN — AMIODARONE HYDROCHLORIDE 200 MILLIGRAM(S): 400 TABLET ORAL at 05:41

## 2020-01-01 RX ADMIN — Medication 2 PUFF(S): at 08:18

## 2020-07-10 PROBLEM — Z00.00 ENCOUNTER FOR PREVENTIVE HEALTH EXAMINATION: Status: ACTIVE | Noted: 2020-01-01

## 2020-09-18 NOTE — ED PROVIDER NOTE - OBJECTIVE STATEMENT
80 yo male, recent aortic aneurysm repair 1 month ago, htn, bph, currently being tx for UTI ( on zosyn and Macrobid, last dose today) , [facility].  Denies any chest pain, sob. fevers, chills or any other complaints. Takes asa daily 82 yo male, recent aortic aneurysm repair 1 month ago, htn, bph, currently being tx for UTI/pneumonia  ( on zosyn and Macrobid, last dose today of macrobid) , [facility].  Denies any chest pain, sob. fevers, chills or any other complaints. Takes asa daily

## 2020-09-18 NOTE — ED PROVIDER NOTE - PROGRESS NOTE DETAILS
Reviewed CTA results with Dr Hu ( pts vascular surgeon), who also reviewed results with radiology Dr Dia. No intervention needed.   Recommended having pulm consul on patient regarding effusions.   SW EMMETT Nye in ICU,  Dr Ni not  in today, will have Dr James see patient in morning.   Pt kamari, NAD.   Discussed with Dr guillermo, will admit patient at this time.

## 2020-09-18 NOTE — H&P ADULT - HISTORY OF PRESENT ILLNESS
81 y/o male, recent aortic aneurysm repair 1 month ago ([facility] Dr Leija), HTN, Emphysema, BPH, currently being tx for UTI/pneumonia  (on zosyn and Macrobid, last dose today of macrobid), hypothyroidism comes from [place] [facility] due to coughing up blood as of this morning.  AAOx3 and provided history. Endorses chronic cough 2/2 emphysema, typically clear/white phlegm however today noticed blood.l denies additional symptoms including headache, fever, chills, cp, sob, abdominal pain, nausea, vomiting, diarrhea, hematuria, melena, syncope, or LOC. Continues to take ASA daily  Fam Hx: mother  age unknown - emphysema. father  70s - emphysema

## 2020-09-18 NOTE — H&P ADULT - NSHPSOCIALHISTORY_GEN_ALL_CORE
Lives in apt with dtr. independent in ADLs. requires assistance with IADLs. >20 cig/day x 40 years. quit 8 months ago. denies etoh, illicit drugs. difficulty with stairs ambulates with cane. does not drive

## 2020-09-18 NOTE — ED PROVIDER NOTE - CARE PLAN
Principal Discharge DX:	Hemoptysis   Principal Discharge DX:	Hemoptysis  Secondary Diagnosis:	Pleural effusion

## 2020-09-18 NOTE — ED ADULT NURSE NOTE - OBJECTIVE STATEMENT
Patient brought in via EMS from [facility] for coughing up blood since this morning. Patient had recent aortic aneurysm repair 1 month ago and is currently being treated for UTI/PNA with zosyn and macrobid. Patient denies chest pain, SOB, abd pain, nausea, vomiting, or other complaints at this time. Patient presents to ED with 14fr durand catheter placed 2 weeks ago.

## 2020-09-18 NOTE — ED PROVIDER NOTE - CLINICAL SUMMARY MEDICAL DECISION MAKING FREE TEXT BOX
80 yo male, recent aortic aneurysm repair 1 month ago, htn, bph, currently being tx for UTI ( on zosyn and Macrobid, last dose today) , [facility].  Denies any chest pain, sob. fevers, chills or any other complaints. Takes asa daily

## 2020-09-18 NOTE — ED ADULT NURSE REASSESSMENT NOTE - NS ED NURSE REASSESS COMMENT FT1
Patient resting in bed at this time with no complaints of pain or discomfort. Patient eating dinner tray

## 2020-09-18 NOTE — H&P ADULT - NSHPLABSRESULTS_GEN_ALL_CORE
LABS:                        9.1    9.80  )-----------( 250      ( 18 Sep 2020 11:34 )             28.3     18    129<L>  |  90<L>  |  15  ----------------------------<  92  3.3<L>   |  33<H>  |  1.50<H>    Ca    9.1      18 Sep 2020 11:34    TPro  6.7  /  Alb  2.0<L>  /  TBili  0.4  /  DBili  x   /  AST  24  /  ALT  27  /  AlkPhos  101  -18    PT/INR - ( 18 Sep 2020 11:34 )   PT: 13.2 sec;   INR: 1.10 ratio           Urinalysis Basic - ( 18 Sep 2020 12:10 )    Color: Yellow / Appearance: Slightly Turbid / S.010 / pH: x  Gluc: x / Ketone: Negative  / Bili: Negative / Urobili: 1   Blood: x / Protein: 30 mg/dL / Nitrite: Negative   Leuk Esterase: Moderate / RBC: 5-10 /HPF / WBC 11-25 /HPF   Sq Epi: x / Non Sq Epi: Neg.-Few / Bacteria: Few /HPF     CAPILLARY BLOOD GLUCOSE    Urinalysis Basic - ( 18 Sep 2020 12:10 )    Color: Yellow / Appearance: Slightly Turbid / S.010 / pH: x  Gluc: x / Ketone: Negative  / Bili: Negative / Urobili: 1   Blood: x / Protein: 30 mg/dL / Nitrite: Negative   Leuk Esterase: Moderate / RBC: 5-10 /HPF / WBC 11-25 /HPF   Sq Epi: x / Non Sq Epi: Neg.-Few / Bacteria: Few /HPF      RADIOLOGY & ADDITIONAL TESTS:    < from: CT Angio Abdomen and Pelvis w/ Oral Cont and w/ IV Cont (20 @ 13:32) >    Addendum:    Correction:  Stent grafts are present in the SMA, celiac axis and bilateral renal arteries. The SMA, celiac axis and left renal artery stent grafts are patent. The right is occluded.  Findings discussed with Dr. Benoit Carmona at 3:30 PM on 2020.  < end of copied text >    < from: CT Angio Abdomen and Pelvis w/ Oral Cont and w/ IV Cont (20 @ 13:32) >  IMPRESSION:  8 mm soft tissue focus in the proximal right mainstem bronchus, either mucous secretions or polypoid mass (2:51  Moderate to large bilateral pleural effusions, greater on the right, with underlying compressive atelectasis of the right middle lobe and bilateral lower lobes. Emphysema. The right pleural effusion is partially loculated.  Ascending aortic aneurysm repair. No evidence of fistula formation between the aorta and airway. High density material in the esophagus is likely ingested as it is present prior toIV contrast administration.  Aortic stent graft extending from the mid descending thoracic aorta to the common iliac arteries. An endoleak is present in the distal abdominal aorta likely fed by retrograde flow in the inferior mesenteric artery.  The SMA and celiac axis have been bypassed. There is an occluded right renal artery stent with right renal atrophy.  Moderately constipated colon. High density material in the esophagus is likely ingested as it is present prior to IV contrast administration.  Cardiomegaly. Trace pericardial effusion.  Nonobstructing left renal calculus.  Gil balloon is inflated in the prostate and should be repositioned. Fiducial markers.  Bladder wall thickening secondary to under distention or cystitis.  Findings were discussed with Dr. YAZ CUEVAS [phone] 2020 2:50 PM by Dr. Dia with read back confirmation.  < from: Xray Chest 1 View AP/PA (20 @ 12:29) >    IMPRESSION: Bilateral lung parenchymal airspace opacities areidentified. Right pleural effusion is noted. A small left pleural effusion cannot be excluded.    < end of copied text >    Consultant(s) Notes Reviewed:  [x ] YES  [ ] NO  Care Discussed with Consultants/Other Providers [ x] YES  [ ] NO  Imaging Personally Reviewed:  [ ] YES  [ ] NO

## 2020-09-18 NOTE — ED PROVIDER NOTE - ATTENDING CONTRIBUTION TO CARE
80 yo male, recent aortic aneurysm repair 1 month ago, htn, bph, currently being tx for UTI ( on zosyn and Macrobid, last dose today) , [facility].  Denies any chest pain, sob. fevers, chills or any other complaints. Takes asa daily  pt admitted to hospital   Dr. Hanna:  I have reviewed and discussed with the PA/ resident the case specifics, including the history, physical assessment, evaluation, conclusion, laboratory results, and medical plan. I agree with the contents, and conclusions. I have personally examined, and interviewed the patient.

## 2020-09-18 NOTE — H&P ADULT - NSHPPHYSICALEXAM_GEN_ALL_CORE
T(C): 36.8 (09-18-20 @ 19:15), Max: 37.1 (09-18-20 @ 11:35)  HR: 62 (09-18-20 @ 19:15) (62 - 72)  BP: 108/66 (09-18-20 @ 19:15) (108/66 - 135/73)  RR: 18 (09-18-20 @ 19:15) (18 - 18)  SpO2: 98% (09-18-20 @ 19:15) (94% - 98%)  Wt(kg): --Vital Signs Last 24 Hrs  T(C): 36.8 (18 Sep 2020 19:15), Max: 37.1 (18 Sep 2020 11:35)  T(F): 98.3 (18 Sep 2020 19:15), Max: 98.8 (18 Sep 2020 11:35)  HR: 62 (18 Sep 2020 19:15) (62 - 72)  BP: 108/66 (18 Sep 2020 19:15) (108/66 - 135/73)  BP(mean): 75 (18 Sep 2020 19:15) (75 - 75)  RR: 18 (18 Sep 2020 19:15) (18 - 18)  SpO2: 98% (18 Sep 2020 19:15) (94% - 98%)    PHYSICAL EXAM:  GENERAL: frail elderly, pleasant, NAD  HEAD:  Atraumatic, Normocephalic  EYES: EOMI, PERRLA, conjunctiva and sclera clear  ENMT: No tonsillar erythema, exudates, or enlargement; Moist mucous membranes  NECK: Supple, No JVD, Normal thyroid  NERVOUS SYSTEM:  Alert & Oriented X3, Good concentration  CHEST/LUNG: respirations unlabored. decreased breath sounds at bases with b/l crackles  HEART: RRR S1S2   ABDOMEN: Soft, Nontender, Nondistended; Bowel sounds present  EXTREMITIES:  2+ Peripheral Pulses, No clubbing, cyanosis, or edema  LYMPH: No lymphadenopathy noted  SKIN: + diffuse ecchymosis UE b/l. no LE c/c/e T(C): 36.8 (09-18-20 @ 19:15), Max: 37.1 (09-18-20 @ 11:35)  HR: 62 (09-18-20 @ 19:15) (62 - 72)  BP: 108/66 (09-18-20 @ 19:15) (108/66 - 135/73)  RR: 18 (09-18-20 @ 19:15) (18 - 18)  SpO2: 98% (09-18-20 @ 19:15) (94% - 98%)  Wt(kg): --Vital Signs Last 24 Hrs  T(C): 36.8 (18 Sep 2020 19:15), Max: 37.1 (18 Sep 2020 11:35)  T(F): 98.3 (18 Sep 2020 19:15), Max: 98.8 (18 Sep 2020 11:35)  HR: 62 (18 Sep 2020 19:15) (62 - 72)  BP: 108/66 (18 Sep 2020 19:15) (108/66 - 135/73)  BP(mean): 75 (18 Sep 2020 19:15) (75 - 75)  RR: 18 (18 Sep 2020 19:15) (18 - 18)  SpO2: 98% (18 Sep 2020 19:15) (94% - 98%)    PHYSICAL EXAM:  GENERAL: frail elderly, pleasant, NAD  HEAD:  Atraumatic, Normocephalic  EYES: EOMI, PERRLA, conjunctiva and sclera clear  ENMT: No tonsillar erythema, exudates, or enlargement; Moist mucous membranes  NECK: Supple, No JVD, Normal thyroid  NERVOUS SYSTEM:  Alert & Oriented X3, Good concentration  CHEST/LUNG: respirations unlabored. decreased breath sounds at bases with b/l crackles  HEART: RRR S1S2   ABDOMEN: Soft, Nontender, Nondistended; Bowel sounds present  EXTREMITIES:  2+ Peripheral Pulses, No clubbing, cyanosis, or edema  LYMPH: No lymphadenopathy noted  SKIN: + diffuse ecchymosis UE b/l. no LE c/c/e b/l

## 2020-09-18 NOTE — H&P ADULT - ASSESSMENT
79 y/o male, recent aortic aneurysm repair 1 month ago ([facility] Dr Hu), HTN, Emphysema, BPH, currently being tx for UTI/pneumonia  (on zosyn and Macrobid, last dose today of macrobid), hypothyroidism, comes from [place] [facility] c/o hemoptysis admitted found to have b/l pleural effusions.    #Recent aortic aneurysm repair 1 month ago ([facility] Dr Hu)  -Afebrile and hemodynamically stable  -Per ED and radiology, Dr. Hu ([facility]) made aware of findings - no acute intervention needed  -Cont tele monitoring   -AM CBC, BMP  -Trop neg x 1, cont to trend    #Bilateral pleural effusion  -CT reviewed. consult pulm in AM for possible tap     #HTN, CAD - well controlled  -cont home meds - metoprolol, amiodarone, amlodipine, lasix, kdur  -cont statin    #Emphysema, hx of tobacco use  - not on home O2, maintain O2 sat >90%, supplement with nasal canula prn  - concern for debility - PMR consult as indicated  -cont home meds    #BPH  -Monitor I/Os  -cont flomax    #UTI/pneumonia (on zosyn and Macrobid)  -Last day of macrobid today    #hypothyroidism   -cont synthroid  -check tsh    Vitals q8h  Diet: DASH  Activity: Bedrest   PT/OT as tolerated  DVT ppx: SCDs  GI ppx: cont ppi  Bowel regimen  Standard precautions: Aspiration, fall, safety, seizure, skin  Code Status: Full Code  HCP: dtr Kaur James  79 y/o male, recent aortic aneurysm repair 1 month ago ([facility] Dr Hu), HTN, Emphysema, BPH, currently being tx for UTI/pneumonia  (on zosyn and Macrobid, last dose today of macrobid), hypothyroidism, comes from [place] [facility] c/o hemoptysis admitted found to have b/l pleural effusions.    #Recent aortic aneurysm repair 1 month ago ([facility] Dr Hu), hemoptysis   -Per ED and radiology, Dr. Hu ([facility]) made aware of findings - no acute intervention needed  -Cont tele monitoring   -Hb 9.1, per chart 13.5 in 2014. Transfuse for Hb <7, keep active T&S  -Repeat CBC now and q12  -Trop neg x 1, cont to trend    #Bilateral pleural effusion  -CT reviewed. consult pulm in AM for possible tap     #Hyponatremia  -Na 129   -Fluid restrict   -Repeat BMP in AM    #Hypokalemia  -replete and monitor. check mag. keep K> 4.0, Mag >2.0  -cont kdur     #VENITA   -Cr 1.50, baseline 1.06 (2014)  -Cont lasix for now, hold if worsening renal function in AM    #HTN, CAD - well controlled  -cont home meds - metoprolol, amiodarone, amlodipine, lasix, kdur  -cont statin    #Emphysema, hx of tobacco use  - not on home O2, maintain O2 sat >90%, supplement with nasal canula prn  - concern for debility - PMR consult as indicated  -cont home meds    #BPH  -Monitor I/Os  -cont flomax    #UTI/pneumonia (on zosyn and Macrobid)  -Last day of macrobid today  -repeat UA mod LE , few bact    #hypothyroidism   -cont synthroid  -check tsh    Vitals q8h  Diet: DASH  Activity: Bedrest   PT/OT as tolerated  DVT ppx: SCDs  GI ppx: cont ppi  Bowel regimen  Standard precautions: Aspiration, fall, safety, seizure, skin  Code Status: Full Code  HCP: dtr Kaur James  81 y/o male, recent aortic aneurysm repair 1 month ago ([facility] Dr Hu), HTN, Emphysema, BPH, currently being tx for UTI/pneumonia  (on zosyn and Macrobid, last dose today of macrobid), hypothyroidism, comes from [place] [facility] c/o hemoptysis admitted found to have b/l pleural effusions.    #Recent aortic aneurysm repair 1 month ago ([facility] Dr Hu), hemoptysis   -Per ED and radiology, Dr. Hu ([facility]) made aware of findings - no acute intervention needed  -Cont tele monitoring   -Hb 9.1, per chart 13.5 in 2014. Transfuse for Hb <7, keep active T&S  -Repeat CBC now and q12  -Trop neg x 1, cont to trend  -check stool occult     #Bilateral pleural effusion  -CT reviewed. consult pulm in AM for possible tap     #Hyponatremia  -Na 129   -Fluid restrict   -Repeat BMP in AM    #Hypokalemia  -replete and monitor. check mag. keep K> 4.0, Mag >2.0  -cont kdur     #VENITA   -Cr 1.50, baseline 1.06 (2014)  -Cont lasix for now, hold if worsening renal function in AM    #HTN, CAD - well controlled  -cont home meds - metoprolol, amiodarone, amlodipine, lasix, kdur  -cont statin    #Emphysema, hx of tobacco use  - not on home O2, maintain O2 sat >90%, supplement with nasal canula prn  - concern for debility - PMR consult as indicated  -cont home meds    #BPH  -Monitor I/Os  -cont flomax    #UTI/pneumonia (on zosyn and Macrobid)  -Last day of macrobid today  -repeat UA mod LE , few bact    #hypothyroidism   -cont synthroid  -check tsh    Vitals q8h  Diet: DASH  Activity: Bedrest   PT/OT as tolerated  DVT ppx: SCDs  GI ppx: cont ppi  Bowel regimen  Standard precautions: Aspiration, fall, safety, seizure, skin  Code Status: Full Code  HCP: dtr Kaur Solorzanooy  79 y/o male, recent aortic aneurysm repair 1 month ago ([facility] Dr [facility]), HTN, Emphysema, BPH, currently being tx for UTI/pneumonia  (on zosyn and Macrobid, last dose today of macrobid), hypothyroidism, comes from [place] [facility] c/o hemoptysis admitted found to have b/l pleural effusions.    #Recent aortic aneurysm repair 08/07/20 ([facility] Dr Hu), hemoptysis   -Per ED and radiology, Dr. Hu ([facility]) made aware of findings - no acute intervention needed  -Cont tele monitoring   -Hb 9.1 today. Hb 9.26 9/12 per [facility].  Transfuse for Hb <7, keep active T&S  -Repeat CBC now and q12h  -Trop neg x 1, cont to trend  -check stool occult   -hold asa  -Per chart, hx of GIB last melanic stool 8/12, underwent EGD at [facility] on 8/13, negative for gastric ulcers, bleed. biopsy sent for h.pylori. advised to cont protonix 40mg qd.     #Bilateral pleural effusion  -CT reviewed. consult pulm in AM for possible tap    #Hyponatremia  -Na 129. Na 132 (9/12)  -Fluid restrict   -Repeat BMP in AM    #Hypokalemia  -replete and monitor. check mag. keep K> 4.0, Mag >2.0  -cont kdur     #VENITA, hx of urinary retention  -Cr 1.50 today, Cr 1.05 on 9/12  -Cont lasix for now, hold if worsening renal function in AM  -Monitor I/Os. 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 no evidence of hemodynamically significant renal artery stenosis on left     #HTN, CAD - well controlled  -cont home meds - metoprolol, amiodarone, amlodipine, lasix, kdur  -cont statin    #Emphysema, hx of tobacco use  - not on home O2, maintain O2 sat >90%, supplement with nasal canula prn  - concern for debility - PMR consult as indicated  -cont home nebs  -incentive spirometry     #BPH  -Monitor I/Os  -cont flomax    #UTI/pneumonia (on zosyn and Macrobid)  -Last day of macrobid today  -repeat UA mod LE , few bact    #hypothyroidism   -cont synthroid  -check tsh    Vitals q8h  Diet: DASH, nutrition consult  Activity: Bedrest   PT/OT as tolerated  DVT ppx: SCDs  GI ppx: cont ppi  Bowel regimen  Standard precautions: Aspiration, fall, safety, seizure, skin  Code Status: Full Code  HCP: dtr Kaur Ramirez

## 2020-09-18 NOTE — ED PROVIDER NOTE - PMH
Aortic aneurysm    BPH (benign prostatic hyperplasia)    Heart disease  sp cardiac cath with stent placement  Hypertension

## 2020-09-19 NOTE — CONSULT NOTE ADULT - SUBJECTIVE AND OBJECTIVE BOX
Initial HPI on admission:  HPI:  79 y/o male, recent aortic aneurysm repair 1 month ago ([facility]), HTN, Emphysema, BPH, currently being tx for UTI/pneumonia  (on zosyn and Macrobid, last dose today of macrobid), hypothyroidism comes from [place] [facility] due to coughing up blood as of this morning.  AAOx3 and provided history. Endorses chronic cough 2/2 emphysema, typically clear/white phlegm however today noticed blood.l denies additional symptoms including headache, fever, chills, cp, sob, abdominal pain, nausea, vomiting, diarrhea, hematuria, melena, syncope, or LOC. Continues to take ASA daily  Fam Hx: mother  age unknown - emphysema. father  70s - emphysema (18 Sep 2020 19:10)      BRIEF HOSPITAL COURSE: Former smoker. Two days ago noted dark sputum with streaks of blood at the NH. No more episodes of hemoptysis since admission to the hospital. No fevers. CT scan of the chest on admission showing right upper lobe atelectasis vs. mass. No history of abnormal bleeding. No other sites of bleeding.     PAST MEDICAL & SURGICAL HISTORY:  Aortic aneurysm    Heart disease  sp cardiac cath with stent placement    BPH (benign prostatic hyperplasia)    Hypertension    S/P coronary artery stent placement  1 stent- blocked artery      Allergies    No Known Allergies    Intolerances      FAMILY HISTORY:  No significant family history      Social history:      Smoking: Former smoker, quit in February     Review of Systems:  All other systems asked, rest per HPI    Medications:  acetaminophen   Tablet .. 650 milliGRAM(s) Oral every 8 hours PRN Mild Pain (1 - 3), Moderate Pain (4 - 6), Severe Pain (7 - 10)  aMIOdarone    Tablet 200 milliGRAM(s) Oral daily  amLODIPine   Tablet 10 milliGRAM(s) Oral daily  atorvastatin 40 milliGRAM(s) Oral at bedtime  buDESOnide    Inhalation Suspension 0.25 milliGRAM(s) Inhalation two times a day  DULoxetine 30 milliGRAM(s) Oral daily  furosemide    Tablet 40 milliGRAM(s) Oral daily  gabapentin 100 milliGRAM(s) Oral at bedtime  influenza   Vaccine 0.5 milliLiter(s) IntraMuscular once  ipratropium 0.02% for Nebulization - Peds 500 MICROGram(s) Inhalation four times a day  levalbuterol for Nebulization - Peds 0.63 milliGRAM(s) Nebulizer four times a day  levothyroxine 100 MICROGram(s) Oral daily  magnesium hydroxide Suspension 30 milliLiter(s) Oral at bedtime PRN Constipation  metoprolol tartrate 25 milliGRAM(s) Oral two times a day  pantoprazole    Tablet 40 milliGRAM(s) Oral before breakfast  potassium chloride    Tablet ER 40 milliEquivalent(s) Oral every 4 hours  potassium chloride    Tablet ER 10 milliEquivalent(s) Oral daily  potassium chloride  10 mEq/100 mL IVPB 10 milliEquivalent(s) IV Intermittent every 1 hour  senna 8.6 milliGRAM(s) Oral Tablet - Peds 2 Tablet(s) Oral at bedtime  simethicone 80 milliGRAM(s) Chew daily PRN Gas  tamsulosin 0.4 milliGRAM(s) Oral at bedtime    MEDICATIONS  (STANDING):  aMIOdarone    Tablet 200 milliGRAM(s) Oral daily  amLODIPine   Tablet 10 milliGRAM(s) Oral daily  atorvastatin 40 milliGRAM(s) Oral at bedtime  buDESOnide    Inhalation Suspension 0.25 milliGRAM(s) Inhalation two times a day  DULoxetine 30 milliGRAM(s) Oral daily  furosemide    Tablet 40 milliGRAM(s) Oral daily  gabapentin 100 milliGRAM(s) Oral at bedtime  influenza   Vaccine 0.5 milliLiter(s) IntraMuscular once  ipratropium 0.02% for Nebulization - Peds 500 MICROGram(s) Inhalation four times a day  levalbuterol for Nebulization - Peds 0.63 milliGRAM(s) Nebulizer four times a day  levothyroxine 100 MICROGram(s) Oral daily  metoprolol tartrate 25 milliGRAM(s) Oral two times a day  pantoprazole    Tablet 40 milliGRAM(s) Oral before breakfast  potassium chloride    Tablet ER 40 milliEquivalent(s) Oral every 4 hours  potassium chloride    Tablet ER 10 milliEquivalent(s) Oral daily  potassium chloride  10 mEq/100 mL IVPB 10 milliEquivalent(s) IV Intermittent every 1 hour  senna 8.6 milliGRAM(s) Oral Tablet - Peds 2 Tablet(s) Oral at bedtime  tamsulosin 0.4 milliGRAM(s) Oral at bedtime    MEDICATIONS  (PRN):  acetaminophen   Tablet .. 650 milliGRAM(s) Oral every 8 hours PRN Mild Pain (1 - 3), Moderate Pain (4 - 6), Severe Pain (7 - 10)  magnesium hydroxide Suspension 30 milliLiter(s) Oral at bedtime PRN Constipation  simethicone 80 milliGRAM(s) Chew daily PRN Gas      Home Medications:  Last Order Reconciliation Date: 20 @ 19:42 (Admission Reconciliation)  Afrin 0.05% nasal spray: 2 spray(s) nasal 2 times a day, As Needed (20 @ 16:43)  ALPRAZolam:  orally  (20 @ 16:43)  amiodarone:  orally  (20 @ 16:43)  amiodarone 200 mg oral tablet: 1 tab(s) orally once a day (20 @ 16:43)  amLODIPine:  orally  (20:43)  amLODIPine 10 mg oral tablet: 1 tab(s) orally once a day (20 @ 16:43)  Antioxidant Multiple Vitamins (A,D,E,K-intensive) and Minerals oral liquid: orally once a day (20 @ 16:43)  aspirin 81 mg oral tablet:  orally  (20 @ 16:43)  atorvastatin:  orally  (20 16:43)  atorvastatin 40 mg oral tablet: 1 tab(s) orally once a day (20 @ 16:43)  Bengay Ultra Strength 4%-10%-30% topical cream: Apply topically to affected area once a day (20 @ 16:43)  budesonide 0.5 mg/2 mL inhalation suspension: 2 milliliter(s) inhaled 2 times a day (20 @ 16:43)  carvedilol:  orally  (20 @ 16:43)  clopidogrel:  orally  (20 @ 16:43)  Cymbalta 30 mg oral delayed release capsule: 1 cap(s) orally once a day (20 @ 16:43)  enalapril:  orally  (20 @ 16:43)  Flomax 0.4 mg oral capsule: 1 cap(s) orally 2 times a day (20 @ 16:43)  furosemide 40 mg oral tablet: 1 tab(s) orally once a day (20 @ 16:43)  gabapentin 100 mg oral capsule: 2 cap(s) orally once (at bedtime) (20 @ 16:43)  ipratropium 500 mcg/2.5 mL inhalation solution: 2.5 milliliter(s) inhaled 4 times a day (20 @ 16:43)  K-Dur 10 oral tablet, extended release: 1 tab(s) orally once a day (20 @ 16:43)  levalbuterol 0.63 mg/3 mL inhalation solution: 3 milliliter(s) inhaled 4 times a day (20 @ 16:43)  levothyroxine 100 mcg (0.1 mg) oral tablet: 1 tab(s) orally once a day (20 @ 16:43)  Macrobid 100 mg oral capsule: 1 cap(s) orally 2 times a day (20 @ 16:43)  Metoprolol Tartrate 25 mg oral tablet: 1 tab(s) orally 2 times a day (20 @ 16:43)  Milk of Magnesia 8% oral suspension: 30 milliliter(s) orally once a day (at bedtime) (20 @ 16:43)  MiraLax oral powder for reconstitution:  (20 @ 16:43)  omeprazole 40 mg oral delayed release capsule: 1 cap(s) orally once a day (20 @ 16:43)  Senna Lax 8.6 mg oral tablet: 2 tab(s) orally once a day (at bedtime) (20 @ 16:43)  simethicone 80 mg oral tablet: 1 tab(s) orally 4 times a day, As Needed (20 @ 16:43)  Symbicort 160 mcg-4.5 mcg/inh inhalation aerosol: 2 puff(s) inhaled 2 times a day (20 @ 16:43)  Tylenol 500 mg oral tablet: 2 tab(s) orally every 8 hours, As Needed (20 @ 16:43)  Zofran 4 mg oral tablet: 1 tab(s) orally every 6 hours, As Needed (20 @ 16:43)      LABS:                        8.9    9.61  )-----------( 281      ( 19 Sep 2020 08:10 )             27.0         134<L>  |  94<L>  |  11  ----------------------------<  78  2.9<LL>   |  34<H>  |  1.24    Ca    9.0      19 Sep 2020 08:10    TPro  6.7  /  Alb  2.0<L>  /  TBili  0.4  /  DBili  x   /  AST  24  /  ALT  27  /  AlkPhos  101  18      CARDIAC MARKERS ( 18 Sep 2020 11:34 )  <.017 ng/mL / x     / x     / x     / x            PT/INR - ( 18 Sep 2020 11:34 )   PT: 13.2 sec;   INR: 1.10 ratio           Urinalysis Basic - ( 18 Sep 2020 12:10 )    Color: Yellow / Appearance: Slightly Turbid / S.010 / pH: x  Gluc: x / Ketone: Negative  / Bili: Negative / Urobili: 1   Blood: x / Protein: 30 mg/dL / Nitrite: Negative   Leuk Esterase: Moderate / RBC: 5-10 /HPF / WBC 11-25 /HPF   Sq Epi: x / Non Sq Epi: Neg.-Few / Bacteria: Few /HPF              CULTURES: (if applicable)    Culture - Urine (collected 20 @ 12:10)  Source: .Urine Clean Catch (Midstream)  Final Report (20 @ 11:29):    <10,000 CFU/mL Normal Urogenital Tari    VITALS:  T(C): 38 (20 @ 10:15), Max: 38 (20 @ 10:15)  T(F): 100.4 (20 @ 10:15), Max: 100.4 (20 @ 10:15)  HR: 72 (20 @ 10:15) (62 - 103)  BP: 129/62 (20 @ 10:15) (108/66 - 137/66)  BP(mean): 75 (20 @ 19:15) (75 - 75)  ABP: --  ABP(mean): --  RR: 18 (20 @ 10:15) (17 - 18)  SpO2: 95% (20 @ 10:15) (91% - 98%)  CVP(mm Hg): --  CVP(cm H2O): --    Ins and Outs     20 @ 07:01  -  20 @ 07:00  --------------------------------------------------------  IN: 0 mL / OUT: 900 mL / NET: -900 mL        Height (cm): 182.9 (20 @ 21:08)  Weight (kg): 62.5 (20 @ 21:08)  BMI (kg/m2): 18.7 (20 @ 21:08)        I&O's Detail    18 Sep 2020 07:01  -  19 Sep 2020 07:00  --------------------------------------------------------  IN:  Total IN: 0 mL    OUT:    Indwelling Catheter - Urethral (mL): 900 mL  Total OUT: 900 mL    Total NET: -900 mL      PHYSICAL EXAM:  GENERAL: Thin elderly male, not in distress  HEAD:  Atraumatic, Normocephalic  EYES: EOMI, PERRLA, conjunctiva and sclera clear  ENMT: No tonsillar erythema, exudates, or enlargement; Moist mucous membranes  NECK: Supple, No JVD, Normal thyroid  NERVOUS SYSTEM:  Alert & Oriented X3, Good concentration, moving all four extremities spontaneously  CHEST/LUNG: Bilateral air entry, no wheezing or ronchi, decreased air entry on the right  HEART: RRR S1S2   ABDOMEN: Soft, Nontender, Nondistended; Bowel sounds present  EXTREMITIES:  2+ Peripheral Pulses, No clubbing, cyanosis, or edema  LYMPH: No lymphadenopathy noted  SKIN: + diffuse ecchymosis bialteral arms and across chest

## 2020-09-19 NOTE — CONSULT NOTE ADULT - SUBJECTIVE AND OBJECTIVE BOX
Patient seen and examined.  Full consult dictated and will be available shortly.    Elderly male with multiple medical problems, recent aneurysm repair now admitted with hemoptysis.  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  He denies any acute complaints.  No abdominal pain, nausea or vomiting.  No melena or BRBPR.  EGD performed last month at [facility] negative.    VSS.  Abdomen soft, nontender BS+    Impression: Guaiac positive anemia    Plan:  1. Monitor Hgb/Hct and bowel movements  2. Continue PPI  3. Diet as tolerated for now

## 2020-09-19 NOTE — CONSULT NOTE ADULT - ASSESSMENT
81 y/o male with history of smoking, recent aortic aneurysm repair presenting with episodes of hemoptysis. Noted with right upper lobe atelectasis, ? mass vs. collapsed lung. Recently had CT scan at [facility]. Does not recall being told about abnormality of right upper lobe.     Assessment:  1. Hemoptysis  2. Right upper lobe collpase  3. Emphysema   4. Aortic aneurysm   5. Hypertension    Plan  - Monitor for further hemoptysis  - Appears had minimal episode of bleeding  - Recommend to obtain CT scan from [facility] performed recently to compare, ?endobronchial lesion if collapsed lung vs. mucus plug  - Chest PT  - Cont. home COPD meds, mainly Symbicort and prn albuterol   - Bp Control  - Discussed with Dr. South

## 2020-09-19 NOTE — PROGRESS NOTE ADULT - ASSESSMENT
79 y/o male, recent aortic aneurysm repair 1 month ago ([facility] Dr [facility]), HTN, Emphysema, BPH, currently being tx for UTI/pneumonia  (on zosyn and Macrobid, last dose today of macrobid), hypothyroidism, comes from [place] [facility] c/o hemoptysis admitted found to have b/l pleural effusions.    #Hemoptysis, Recent aortic aneurysm repair 08/07/20 ([facility])  -Per ED and radiology, Dr. Hu ([facility]) made aware of findings - no acute intervention needed  -Cont tele monitoring   -Hb 8.9 today. Hb 9.26 9/12 per NH.  Transfuse for Hb <7, keep active T&S  -Repeat CBC now and q12h  -Trop neg x 1, cont to trend  -stool occult positive, GI made aware  -hold asa  -Per chart, hx of GIB last melanic stool 8/12, underwent EGD at [facility] on 8/13, negative for gastric ulcers, bleed. biopsy sent for h.pylori. advised to cont protonix 40mg qd    #Bilateral pleural effusion, Soft tissue focus (muscous vs mass per imaging)  -CT reviewed   -Pulm consulted  -Will try to obtain previous imaging for comparison    #Hyponatremia  -Na 129. Na 132 (9/12)  -Fluid restrict   -Repeat BMP in AM    #Hypokalemia  -replete and monitor. check mag. keep K> 4.0, Mag >2.0    #VENITA, hx of urinary retention - improved  -Cr 1.24 today, Cr 1.05 on 9/12  -Cont lasix for now   -Monitor BMP  -Monitor I/Os. 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 no evidence of hemodynamically significant renal artery stenosis on left     #HTN, CAD - well controlled  -cont home meds - metoprolol, amiodarone, amlodipine, lasix, kdur  -cont statin    #Emphysema, hx of tobacco use  - not on home O2, maintain O2 sat >90%, supplement with nasal canula prn  - concern for debility - PMR consult as indicated  -cont home nebs  -incentive spirometry     #BPH  -Monitor I/Os  -cont flomax    #UTI/pneumonia (on zosyn and Macrobid)  -Last day of macrobid today  -repeat UA mod LE , few bact    #hypothyroidism   -cont synthroid  -check tsh    Vitals q8h  Diet: DASH, nutrition consult  Activity: Bedrest   PT/OT as tolerated  DVT ppx: SCDs for now  GI ppx: cont ppi  Bowel regimen  Standard precautions: Aspiration, fall, safety, seizure, skin  Code Status: Full Code  HCP: dtr Kaur Ramirez

## 2020-09-20 NOTE — PROGRESS NOTE ADULT - ASSESSMENT
PHYSICAL EXAM:  GENERAL: Thin elderly male, not in distress  HEAD:  Atraumatic, Normocephalic  EYES: EOMI, PERRLA, conjunctiva and sclera clear  ENMT: No tonsillar erythema, exudates, or enlargement; Moist mucous membranes  NECK: Supple, No JVD, Normal thyroid  NERVOUS SYSTEM:  Alert & Oriented X3, Good concentration, moving all four extremities spontaneously  CHEST/LUNG: Bilateral air entry, no wheezing or ronchi, decreased air entry on the right  HEART: RRR S1S2   ABDOMEN: Soft, Nontender, Nondistended; Bowel sounds present  EXTREMITIES:  2+ Peripheral Pulses, No clubbing, cyanosis, or edema  LYMPH: No lymphadenopathy noted  SKIN: + diffuse ecchymosis bialteral arms and across chest    81 y/o male with history of smoking, recent aortic aneurysm repair presenting with episodes of hemoptysis. Noted with right upper lobe atelectasis, ? mass vs. collapsed lung. Recently had CT scan at [facility]. Does not recall being told about abnormality of right upper lobe.     As per discharge records obtained from [facility]. Recent bronchoscopy performed at [facility] in June. No mention of endobronchial lesions. Awaiting CT scan of the chest.     Assessment:  1. Hemoptysis - ? Bronchitis vs. PNA  2. Right upper lobe collapse  3. Emphysema   4. Aortic aneurysm   5. Hypertension    Plan  - Monitor for further hemoptysis  - Appears had minimal episode of bleeding  - Awaiting to obtain CT scan from [facility] performed recently to compare, ?endobronchial lesion if collapsed lung vs. mucus plug  - Chest PT  - Cont. home COPD meds, mainly Symbicort and prn albuterol   - Bp Control  - Discussed with Dr. South

## 2020-09-20 NOTE — PROGRESS NOTE ADULT - ASSESSMENT
81 y/o male, recent aortic aneurysm repair 1 month ago ([facility]), HTN, Emphysema, BPH, currently being tx for UTI/pneumonia  (on zosyn and Macrobid, last dose today of macrobid), hypothyroidism, comes from [place] [facility] c/o hemoptysis admitted found to have b/l pleural effusions.    #Hemoptysis, Recent aortic aneurysm repair 08/07/20 ([facility] Dr [facility])  -Per ED and radiology, Dr. Hu ([facility]) made aware of findings - no acute intervention needed  -Cont tele monitoring   -Hb 8.1 today. Hb 9.26 9/12 per NH.  Transfuse for Hb <7, keep active T&S  -Trop neg x 1, cont to trend  -stool occult positive, GI following, recent EGD done last month  -C/w ASA 81mg daily  -Per chart, hx of GIB last melanic stool 8/12, underwent EGD at [facility] on 8/13, negative for gastric ulcers, bleed. biopsy sent for h.pylori. advised to cont protonix 40mg qd    #Bilateral pleural effusion, Soft tissue focus (muscous vs mass per imaging)  -CT reviewed   -Pulm consulted  -Will try to obtain previous imaging for comparison tomorrow     #Hyponatremia  -Fluid restrict   -Repeat BMP in AM    #Hypokalemia  -replete and monitor. check mag. keep K> 4.0, Mag >2.0    #VENITA, hx of urinary retention - improved  -Cr 1.24 today, Cr 1.05 on 9/12  -Cont lasix for now   -Monitor BMP  -Monitor I/Os. PVR as indicated   -renal duplex 8/11/20 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 no evidence of hemodynamically significant renal artery stenosis on left     #HTN, CAD - well controlled  -cont home meds - metoprolol, amiodarone, amlodipine, lasix, kdur  -cont statin    #Emphysema, hx of tobacco use  - not on home O2, maintain O2 sat >90%, supplement with nasal canula prn  - concern for debility - PMR consult as indicated  -cont home nebs  -incentive spirometry     #BPH  -Monitor I/Os  -cont flomax    #UTI/pneumonia (on zosyn and Macrobid)  -Completed course of macrobid   -repeat UA mod LE , few bact    #hypothyroidism   -cont synthroid 100mcg daily  -TSH 3.87    Vitals q8h  Diet: DASH, nutrition consult  Activity: Bedrest   PT/OT as tolerated  DVT ppx: SCDs for now, will consider prophylaxis tomorrow if Hg remains stable  GI ppx: cont ppi  Bowel regimen  Standard precautions: Aspiration, fall, safety, seizure, skin  Code Status: Full Code  HCP: dtr Kaur Ramirez  79 y/o male, recent aortic aneurysm repair 1 month ago ([facility]), HTN, Emphysema, BPH, currently being tx for UTI/pneumonia  (on zosyn and Macrobid, last dose today of macrobid), hypothyroidism, comes from [place] [facility] c/o hemoptysis admitted found to have b/l pleural effusions.    #Hemoptysis, Recent aortic aneurysm repair 08/07/20 ([facility])  -Per ED and radiology, Dr. Hu ([facility]) made aware of findings - no acute intervention needed  -Cont tele monitoring   -Hb 8.1 today. Hb 9.26 9/12 per NH.  Transfuse for Hb <7, keep active T&S  -Trop neg x 1, cont to trend  -stool occult positive, GI following, recent EGD done last month  -C/w ASA 81mg daily  -Per chart, hx of GIB last melanic stool 8/12, underwent EGD at [facility] on 8/13, negative for gastric ulcers, bleed. biopsy sent for h.pylori. advised to cont protonix 40mg qd    #Bilateral pleural effusion, Soft tissue focus (muscous vs mass per imaging)  -CT reviewed   -Pulm consulted  -Will try to obtain previous imaging for comparison tomorrow     #Hyponatremia  -Fluid restrict   -Repeat BMP in AM    #Hypokalemia  -replete and monitor. check mag. keep K> 4.0, Mag >2.0    #VENITA, hx of urinary retention - improved  -Cr 1.24 today, Cr 1.05 on 9/12  -Cont lasix for now   -Monitor BMP  -Monitor I/Os. 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 no evidence of hemodynamically significant renal artery stenosis on left     #HTN, CAD - well controlled  -cont home meds - metoprolol, amiodarone, amlodipine, lasix, kdur  -cont statin    #Emphysema, hx of tobacco use  - not on home O2, maintain O2 sat >90%, supplement with nasal canula prn  - concern for debility - PMR consult as indicated  -cont home nebs  -incentive spirometry     #BPH  -Monitor I/Os  -cont flomax    #UTI/pneumonia (on zosyn and Macrobid)  -Completed course of macrobid   -repeat UA mod LE , few bact    #hypothyroidism   -cont synthroid 100mcg daily  -TSH 3.87    #severe protein calorie malnutrition  -dietary consult appreciated  -2xprotein at breakfast    Vitals q8h  Diet: DASH, nutrition consult  Activity: Bedrest   PT/OT as tolerated  DVT ppx: SCDs for now, will consider prophylaxis tomorrow if Hg remains stable  GI ppx: cont ppi  Bowel regimen  Standard precautions: Aspiration, fall, safety, seizure, skin  Code Status: Full Code  HCP: dtr Kaur Ramirez

## 2020-09-20 NOTE — DIETITIAN INITIAL EVALUATION ADULT. - ADD RECOMMEND
Honor pt's food preferences as feasible with prescribed diet. Obtain and record PO intake and weights daily.

## 2020-09-20 NOTE — DIETITIAN INITIAL EVALUATION ADULT. - ENERGY NEEDS
Ht: 72 in (183 cm)   Wt: 136.6 Lbs (62 Kg)     IBW: 178 Lbs +/-10%   %IBW: 76.7%     BMI: 18.6                    Edema: None     Skin: WDL except ecchymotic

## 2020-09-20 NOTE — CHART NOTE - TREATMENT: THE FOLLOWING DIET HAS BEEN RECOMMENDED
Change diet to Regular add 2 x protein @ breakfast   Pt dislikes commercial supplements.  Honor pt's food preferences as feasible with prescribed diet.   Obtain and record PO intake and weights daily

## 2020-09-20 NOTE — DIETITIAN INITIAL EVALUATION ADULT. - PERTINENT LABORATORY DATA
20 : H.1 L, Hct: 25.5 L, Na:  131 L, K: 4.1, BUN:  12, Creat:  1.26, Glucose:  84,  TSH: 3.87, eGFR: 54 L.

## 2020-09-21 NOTE — PROGRESS NOTE ADULT - ASSESSMENT
79 y/o male, recent aortic aneurysm repair 1 month ago ([facility] Dr Hu), HTN, Emphysema, BPH, currently being tx for UTI/pneumonia  (on zosyn and Macrobid, last dose today of macrobid), hypothyroidism, comes from [place] [facility] c/o hemoptysis admitted found to have b/l pleural effusions.    #Hemoptysis, Recent aortic aneurysm repair 08/07/20 ([facility] Dr Hu)  -Per ED and radiology, Dr. Hu ([facility]) made aware of findings - no acute intervention needed  -Cont tele monitoring   -Monitor H/H. Stable. Transfuse for Hb <7  -stool occult positive, GI following, recent EGD done last month  -C/w ASA 81mg daily  -Per chart, hx of GIB last melanic stool 8/12, underwent EGD at [facility] on 8/13, negative for gastric ulcers, bleed.  Biopsy sent for h.pylori. advised to cont protonix 40mg qd    #Bilateral pleural effusion, Soft tissue focus mucous vs mass per imaging)  -CT reviewed   -Pulm consulted, recommendations appreciated    #Hyponatremia  -Fluid restrict   -Repeat BMP in AM    #VENITA, hx of urinary retention - improved  -Baseline Cr 1.05 on 9/12  -Cont Lasix for now   -Monitor BMP  -Monitor I/Os. 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 no evidence of hemodynamically significant renal artery stenosis on left     #HTN, CAD - well controlled  -cont home meds - metoprolol, amiodarone, amlodipine, Lasix, Imdur  -cont statin    #Emphysema, hx of tobacco use  - not on home O2, maintain O2 sat >90%, supplement with nasal canula prn  - concern for debility - PMR consult as indicated  -cont home nebs  -incentive spirometry     #BPH  -Monitor I/Os  -cont flomax    #UTI/pneumonia (on zosyn and Macrobid)  -Completed course of macrobid   -repeat UA mod LE , few bact  -Afebrile, no leukocytosis. Monitor off abx    #hypothyroidism   -cont synthroid 100mcg daily  -TSH 3.87    #severe protein calorie malnutrition  -dietary consult appreciated  -2xprotein at breakfast    Vitals q8h  Diet: DASH, nutrition consult  Activity: Bedrest   PT/OT as tolerated  DVT ppx: SCDs for now, will consider prophylaxis tomorrow if Hg remains stable  GI ppx: cont ppi  Bowel regimen  Standard precautions: Aspiration, fall, safety, seizure, skin  Code Status: Full Code  HCP: dtr Kaur Ramirez  79 y/o male, recent aortic aneurysm repair 1 month ago ([facility] Dr Hu), HTN, Emphysema, BPH, currently being tx for UTI/pneumonia  (on zosyn and Macrobid, last dose today of macrobid), hypothyroidism, comes from [place] [facility] c/o hemoptysis admitted found to have b/l pleural effusions.    #Hemoptysis, Recent aortic aneurysm repair 08/07/20 ([facility] Dr Hu)  -Per ED and radiology, Dr. Hu ([facility]) made aware of findings - no acute intervention needed  -Cont tele monitoring   -Monitor H/H. Stable. Transfuse for Hb <7  -stool occult positive, GI following, recent EGD done last month  -C/w ASA 81mg daily  -Per chart, hx of GIB last melanic stool 8/12, underwent EGD at [facility] on 8/13, negative for gastric ulcers, bleed.  Biopsy sent for h.pylori. advised to cont protonix 40mg qd    #Bilateral pleural effusion, Soft tissue focus mucous vs mass per imaging)  -Right pl effusion partially loculated on CT imaging, per pulm plan for bedside US and consider thoracentesis. Possible bronch this week   -Pulm consulted, recommendations appreciated    #Hyponatremia  -Fluid restrict   -Repeat BMP in AM    #VENITA, hx of urinary retention - improved  -Baseline Cr 1.05 on 9/12  -Cont Lasix for now   -Monitor BMP  -Monitor I/Os. PVR as indicated   -renal duplex 8/11/20 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 no evidence of hemodynamically significant renal artery stenosis on left     #HTN, CAD - well controlled  -cont home meds - metoprolol, amiodarone, amlodipine, Lasix, Imdur  -cont statin    #Emphysema, hx of tobacco use  - not on home O2, maintain O2 sat >90%, supplement with nasal canula prn  -cont home nebs  -incentive spirometry     #BPH  -Monitor I/Os  -cont flomax    #UTI/pneumonia (on zosyn and Macrobid)  -Completed course of macrobid   -repeat UA mod LE , few bact  -Afebrile, no leukocytosis. Monitor off abx    #hypothyroidism   -cont synthroid 100mcg daily  -TSH 3.87    #severe protein calorie malnutrition  -dietary consult appreciated  -2xprotein at breakfast    DVT prophylaxis: SCDs    Code Status: Full Code  HCP: moises Ramirez  79 y/o male, recent aortic aneurysm repair 1 month ago ([facility] Dr Hu), HTN, Emphysema, BPH, currently being tx for UTI/pneumonia  (on zosyn and Macrobid, last dose today of macrobid), hypothyroidism, comes from [place] [facility] c/o hemoptysis admitted found to have b/l pleural effusions.    #Hemoptysis, Recent aortic aneurysm repair 08/07/20 ([facility] Dr Hu)  -Per ED and radiology, Dr. Hu ([facility]) made aware of findings - no acute intervention needed  -Cont tele monitoring   -Monitor H/H. Stable. Transfuse for Hb <7  -stool occult positive, GI following, recent EGD done last month  -C/w ASA 81mg daily  -Per chart, hx of GIB last melanic stool 8/12, underwent EGD at [facility] on 8/13, negative for gastric ulcers, bleed.  Biopsy sent for h.pylori. advised to cont protonix 40mg qd    #Bilateral pleural effusion, Soft tissue focus mucous vs mass per imaging)  -Right pl effusion partially loculated on CT imaging, per pulm plan for bedside US and consider thoracentesis. Possible bronch this week   -Pulm consulted, recommendations appreciated    #Hyponatremia  -Fluid restrict   -Repeat BMP in AM    #VENITA, hx of urinary retention - improved  -Baseline Cr 1.05 on 9/12  -Cont Lasix for now   -Monitor BMP  -Monitor I/Os. 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 no evidence of hemodynamically significant renal artery stenosis on left     #HTN, CAD - well controlled  -cont home meds - metoprolol, amiodarone, amlodipine, Lasix, Imdur  -cont statin    #Emphysema, hx of tobacco use  - not on home O2, maintain O2 sat >90%, supplement with nasal canula prn  -cont home nebs  -incentive spirometry     #BPH  -Monitor I/Os  -cont flomax    #UTI/pneumonia (on zosyn and Macrobid)  -Completed course of macrobid   -repeat UA mod LE , few bact  -Afebrile, no leukocytosis. Monitor off abx    #hypothyroidism   -cont synthroid 100mcg daily  -TSH 3.87    #severe protein calorie malnutrition  -dietary consult appreciated  -2xprotein at breakfast    DVT prophylaxis: SCDs    Code Status: Full Code  HCP: dtr Kaur Ramirez   Attempted to reach daughter, awaiting call back. 79 y/o male, recent aortic aneurysm repair 1 month ago ([facility] Dr Hu), HTN, Emphysema, BPH, currently being tx for UTI/pneumonia  (on zosyn and Macrobid, last dose today of macrobid), hypothyroidism, comes from [place] [facility] c/o hemoptysis admitted found to have b/l pleural effusions.    #Hemoptysis, Recent aortic aneurysm repair 08/07/20 ([facility] Dr Hu)  -Per ED and radiology, Dr. Hu ([facility]) made aware of findings - no acute intervention needed  -Cont tele monitoring   -Monitor H/H. Stable. Transfuse for Hb <7  -stool occult positive, GI following, recent EGD done last month  -C/w ASA 81mg daily  -Per chart, hx of GIB last melanic stool 8/12, underwent EGD at [facility] on 8/13, negative for gastric ulcers, bleed.  Biopsy sent for h.pylori. advised to cont protonix 40mg qd    #Acute hypoxic respiratory failure  #Bilateral pleural effusion, Soft tissue focus mucous vs mass per imaging)  -Right pl effusion partially loculated on CT imaging, per pulm plan for bedside US and consider thoracentesis. Possible bronch this week   -Pulm consulted, recommendations appreciated    #Hyponatremia  -Fluid restrict   -Repeat BMP in AM    #VENITA, hx of urinary retention - improved  -Baseline Cr 1.05 on 9/12  -Cont Lasix for now   -Monitor BMP  -Monitor I/Os. 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 no evidence of hemodynamically significant renal artery stenosis on left     #HTN, CAD - well controlled  -cont home meds - metoprolol, amiodarone, amlodipine, Lasix, Imdur  -cont statin    #Emphysema, hx of tobacco use  - not on home O2, maintain O2 sat >90%, supplement with nasal canula prn  -cont home nebs  -incentive spirometry     #BPH  -Monitor I/Os  -cont flomax    #UTI/pneumonia (on zosyn and Macrobid)  -Completed course of macrobid   -repeat UA mod LE , few bact  -Afebrile, no leukocytosis. Monitor off abx    #hypothyroidism   -cont synthroid 100mcg daily  -TSH 3.87    #severe protein calorie malnutrition  -dietary consult appreciated  -2xprotein at breakfast    DVT prophylaxis: SCDs    Code Status: Full Code  HCP: dtr Kaur Ramirez   Attempted to reach daughter, awaiting call back.

## 2020-09-21 NOTE — PROGRESS NOTE ADULT - ASSESSMENT
Physical Examination:  GENERAL:               Alert, Oriented, No acute distress.    HEENT:                     No JVD, Moist MM  PULM:                     Bilateral air entry, diminished to auscultation bilaterally, no significant sputum production, + Rales, No Rhonchi, No Wheezing  CVS:                         S1, S2,  + Murmur  ABD:                        Soft, nondistended, nontender, normoactive bowel sounds,   NEURO:                  Alert, oriented, interactive, nonfocal, follows commands  PSYC:                      Calm, + Insight.        Assessment    79 y/o male with history of smoking, recent aortic aneurysm repair presenting with episodes of hemoptysis. Noted with right upper lobe atelectasis, ? mass vs. collapsed lung. Recently had CT scan at [facility]. Does not recall being told about abnormality of right upper lobe.     As per discharge records obtained from [facility]. Recent bronchoscopy performed at [facility] in June. No mention of endobronchial lesions. Awaiting CT scan of the chest.     Problem List  1. Hemoptysis - ? Bronchitis vs. PNA  2. Right upper lobe collapse  3. Emphysema Ex smoker  4. Aortic aneurysm , s/p repair   5. B/L Right > Left Pleural Effusion  6. Abnormal CT chest with ? Endobronchial lesion vs secretions.  7.Hypertension    Plan  - Monitor for further hemoptysis  - Appears had minimal episode of bleeding  - Awaiting to obtain CT scan from [facility] performed recently to compare, ?endobronchial lesion if collapsed lung vs. mucus plug  - Noted Right pl effusion abnorml apears to be partially loculated, will do bedside US and consider Thoracentesis Possible bronch this week   - Chest PT  - Cont. home COPD meds, mainly Symbicort and prn albuterol   - Bp Control  - Discussed with Dr. Desouza  - DTYAZMIN Monte - awaiting call back . [phone]

## 2020-09-21 NOTE — PHYSICAL THERAPY INITIAL EVALUATION ADULT - ADDITIONAL COMMENTS
pt from [facility], states that he ambulates short distances w/rolling walker in therapy and is otherwise in w/c. pt requires assist w/most ADL's

## 2020-09-22 NOTE — PROGRESS NOTE ADULT - ASSESSMENT
81 y/o male, recent aortic aneurysm repair 1 month ago ([facility] Dr Hu), HTN, Emphysema, BPH, currently being tx for UTI/pneumonia  (on zosyn and Macrobid, last dose today of macrobid), hypothyroidism, comes from [place] [facility] c/o hemoptysis admitted found to have b/l pleural effusions.    #Hemoptysis, Recent aortic aneurysm repair 08/07/20 ([facility] Dr Hu)  -Per ED and radiology, Dr. Hu ([facility]) made aware of findings - no acute intervention needed  -Cont tele monitoring   -Monitor H/H. Stable. Transfuse for Hb <7  -stool occult positive, GI following, recent EGD done last month  -C/w ASA 81mg daily  -Per chart, hx of GIB last melanic stool 8/12, underwent EGD at [facility] on 8/13, negative for gastric ulcers, bleed.  Biopsy sent for h.pylori. advised to cont protonix 40mg qd    #Acute hypoxic respiratory failure  #Bilateral pleural effusion, Soft tissue focus mucous vs mass per imaging)  -Right pl effusion partially loculated on CT imaging  -S/p thoracentesis on 9/21. Possible bronch this week   -Pulm consulted, recommendations appreciated  -Continue supplemental O2, titrate off with appropriate SpO2. Patient does not use supplemental O2 at home    #Hyponatremia  -Fluid restrict   -Repeat BMP in AM    #VENITA, hx of urinary retention  -Baseline Cr 1.05 on 9/12  -Cont Lasix for now   -Monitor BMP  -Monitor I/Os. 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 no evidence of hemodynamically significant renal artery stenosis on left     #HTN, CAD - well controlled  -cont home meds - metoprolol, amiodarone, amlodipine, Lasix, Imdur  -cont statin    #Emphysema, hx of tobacco use  - not on home O2, maintain O2 sat >90%, supplement with nasal canula prn  -cont home meds - Albuterol, Ipratropium, Symbicort   -incentive spirometry     #BPH  -Monitor I/Os  -cont flomax    #UTI/pneumonia (on zosyn and Macrobid)  -Completed course of macrobid   -repeat UA mod LE , few bact  -Afebrile, no leukocytosis. Monitor off abx    #hypothyroidism   -cont synthroid 100mcg daily  -TSH 3.87    #severe protein calorie malnutrition  -dietary consult appreciated  -2xprotein at breakfast. Patient dislikes supplemental shakes    DVT prophylaxis: SCDs    Code Status: Full Code  PT recommending return to [facility]    HCP: dtr Kaur Ramirez  79 y/o male, recent aortic aneurysm repair 1 month ago ([facility] Dr Hu), HTN, Emphysema, BPH, currently being tx for UTI/pneumonia  (on zosyn and Macrobid, last dose today of macrobid), hypothyroidism, comes from [place] [facility] c/o hemoptysis admitted found to have b/l pleural effusions.    #Hemoptysis, Recent aortic aneurysm repair 08/07/20 ([facility] Dr Hu)  -Per ED and radiology, Dr. Hu ([facility]) made aware of findings - no acute intervention needed  -Cont tele monitoring   -Monitor H/H. Stable. Transfuse for Hb <7  -stool occult positive, GI following, recent EGD done last month  -C/w ASA 81mg daily  -Per chart, hx of GIB last melanic stool 8/12, underwent EGD at [facility] on 8/13, negative for gastric ulcers, bleed.  Biopsy sent for h.pylori. advised to cont protonix 40mg qd    #Acute hypoxic respiratory failure  #Bilateral pleural effusion, Soft tissue focus mucous vs mass per imaging)  -Right pl effusion partially loculated on CT imaging  -S/p thoracentesis on 9/21. Possible bronch this week   -Pulm consulted, recommendations appreciated  -Continue supplemental O2, titrate off with appropriate SpO2. Patient does not use supplemental O2 at home    #Hyponatremia  -Fluid restrict   -Repeat BMP in AM    #VENITA, hx of urinary retention  -Baseline Cr 1.05 on 9/12  -Cont Lasix for now   -Monitor BMP  -Monitor I/Os. 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 no evidence of hemodynamically significant renal artery stenosis on left     #HTN, CAD - well controlled  -cont home meds - metoprolol, amiodarone, amlodipine, Lasix, Imdur  -cont statin    #Emphysema, hx of tobacco use  - not on home O2, maintain O2 sat >90%, supplement with nasal canula prn  -cont home meds - Albuterol, Ipratropium, Symbicort   -incentive spirometry     #BPH  -Monitor I/Os  -cont flomax    #UTI/pneumonia (on zosyn and Macrobid)  -Completed course of macrobid   -repeat UA mod LE , few bact  -Afebrile, no leukocytosis. Monitor off abx    #hypothyroidism   -cont synthroid 100mcg daily  -TSH 3.87    #severe protein calorie malnutrition  -dietary consult appreciated  -2xprotein at breakfast. Patient dislikes supplemental shakes    DVT prophylaxis: SCDs    Code Status: Full Code  PT recommending return to [facility]    HCP: dtr Kaur Ramirez   Left message again for patient's daughter, awaiting call back. 79 y/o male, recent aortic aneurysm repair 1 month ago ([facility] Dr Hu), HTN, Emphysema, BPH, currently being tx for UTI/pneumonia  (on zosyn and Macrobid, last dose today of macrobid), hypothyroidism, comes from [place] [facility] c/o hemoptysis admitted found to have b/l pleural effusions.    #Hemoptysis, Recent aortic aneurysm repair 08/07/20 ([facility] Dr Hu)  -Per ED and radiology, Dr. Hu ([facility]) made aware of findings - no acute intervention needed  -Cont tele monitoring   -Monitor H/H. Stable. Transfuse for Hb <7  -stool occult positive, GI following, recent EGD done last month  -C/w ASA 81mg daily  -Per chart, hx of GIB last melanic stool 8/12, underwent EGD at [facility] on 8/13, negative for gastric ulcers, bleed.  Biopsy sent for h.pylori. advised to cont protonix 40mg qd    #Acute hypoxic respiratory failure  #Bilateral pleural effusion, Soft tissue focus mucous vs mass per imaging)  -Right pl effusion partially loculated on CT imaging  -S/p thoracentesis on 9/21. Possible bronch this week   -Pulm consulted, recommendations appreciated  -Continue supplemental O2, titrate off with appropriate SpO2. Patient does not use supplemental O2 at home    #Hyponatremia  -Fluid restrict   -Repeat BMP in AM    #VENITA, hx of urinary retention  -Baseline Cr 1.05 on 9/12  -Cont Lasix for now   -Monitor BMP  -Monitor I/Os. 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 no evidence of hemodynamically significant renal artery stenosis on left     #HTN, CAD - well controlled  -cont home meds - metoprolol, amiodarone, amlodipine, Lasix, Imdur  -cont statin    #Emphysema, hx of tobacco use  - not on home O2, maintain O2 sat >90%, supplement with nasal canula prn  -cont home meds - Albuterol, Ipratropium, Symbicort   -incentive spirometry     #BPH  -Monitor I/Os  -cont flomax    #UTI/pneumonia (on zosyn and Macrobid)  -Completed course of macrobid   -repeat UA mod LE , few bact  -Afebrile, no leukocytosis. Monitor off abx    #hypothyroidism   -cont synthroid 100mcg daily  -TSH 3.87    #severe protein calorie malnutrition  -dietary consult appreciated  -2xprotein at breakfast. Patient dislikes supplemental shakes    DVT prophylaxis: SCDs    Code Status: Full Code  PT recommending return to [facility]    HCP: dtr Kaur Ramirez   Discussed case with patients daughter, aware and in agreement with above.

## 2020-09-22 NOTE — PROGRESS NOTE ADULT - ASSESSMENT
Physical Examination:  GENERAL:               Alert, Oriented, No acute distress.    HEENT:                     No JVD, Moist MM  PULM:                     Bilateral air entry, diminished to auscultation bilaterally, no significant sputum production, + Rales, No Rhonchi, No Wheezing  CVS:                         S1, S2,  + Murmur  ABD:                        Soft, nondistended, nontender, normoactive bowel sounds,   NEURO:                  Alert, oriented, interactive, nonfocal, follows commands  PSYC:                      Calm, + Insight.        Assessment    81 y/o male with history of smoking, recent aortic aneurysm repair presenting with episodes of hemoptysis. Noted with right upper lobe atelectasis, ? mass vs. collapsed lung. Recently had CT scan at [facility]. Does not recall being told about abnormality of right upper lobe.     As per discharge records obtained from [facility]. Recent bronchoscopy performed at [facility] in June. No mention of endobronchial lesions. Awaiting CT scan of the chest.     Problem List  1. Hemoptysis - ? Bronchitis vs. PNA h/o in past with Bronch 6/3/2020- RML Blood clot.\  2. Right upper lobe collapse ? plug vs from effusion  3. Emphysema Ex smoker  4. Aortic aneurysm , s/p repair   5. B/L Right > Left Pleural Effusion s/p Right Thoracentesis 9/21 Transudate  6. Abnormal CT chest with ? Endobronchial lesion vs secretions.  7.Hypertension    Plan  - Monitor for further hemoptysis  - Appears had minimal episode of bleeding and currently resolved  - After discussion with Dr. Perez, with h/o bronch with no endobronchial lesion and Transudative pleural effusion, low prob of acute malignancy at this time.     With risks > Benefits of acute bronchoscopy     will hold off invasive procedure and opt for repeat CT in 1 month.     - Chest PT  - Cont. home COPD meds, mainly Symbicort and prn albuterol   - Bp Control  - Discussed with Dr. Desouza - can start dispo planning back to [facility]  - DTR Kaur Paged again 9/22 - awaiting call back . [phone]

## 2020-09-22 NOTE — PROGRESS NOTE ADULT - ASSESSMENT
Pt sitting up in bed, eating breakfast. Denies any abdominal pain or hemoptysis.         HPI:  81 y/o male, recent aortic aneurysm repair 1 month ago ([facility]), HTN, Emphysema, BPH, currently being tx for UTI/pneumonia  (on zosyn and Macrobid, last dose today of macrobid), hypothyroidism comes from [place] [facility] due to coughing up blood as of this morning.  AAOx3 and provided history. Endorses chronic cough 2/2 emphysema, typically clear/white phlegm however today noticed blood.l denies additional symptoms including headache, fever, chills, cp, sob, abdominal pain, nausea, vomiting, diarrhea, hematuria, melena, syncope, or LOC. Continues to take ASA daily  Fam Hx: mother  age unknown - emphysema. father  70s - emphysema (18 Sep 2020 19:10)      No Known Allergies      acetaminophen   Tablet .. 650 milliGRAM(s) Oral every 8 hours PRN  ALBUTerol    90 MICROgram(s) HFA Inhaler 2 Puff(s) Inhalation every 6 hours  aMIOdarone    Tablet 200 milliGRAM(s) Oral daily  amLODIPine   Tablet 10 milliGRAM(s) Oral daily  aspirin  chewable 81 milliGRAM(s) Oral daily  atorvastatin 40 milliGRAM(s) Oral at bedtime  budesonide  80 MICROgram(s)/formoterol 4.5 MICROgram(s) Inhaler 2 Puff(s) Inhalation two times a day  DULoxetine 30 milliGRAM(s) Oral daily  furosemide    Tablet 40 milliGRAM(s) Oral daily  gabapentin 100 milliGRAM(s) Oral at bedtime  influenza   Vaccine 0.5 milliLiter(s) IntraMuscular once  ipratropium 17 MICROgram(s) HFA Inhaler 2 Puff(s) Inhalation every 6 hours  levothyroxine 100 MICROGram(s) Oral daily  magnesium hydroxide Suspension 30 milliLiter(s) Oral at bedtime  metoprolol tartrate 25 milliGRAM(s) Oral two times a day  pantoprazole    Tablet 40 milliGRAM(s) Oral before breakfast  potassium chloride    Tablet ER 10 milliEquivalent(s) Oral daily  senna 8.6 milliGRAM(s) Oral Tablet - Peds 2 Tablet(s) Oral at bedtime  simethicone 80 milliGRAM(s) Chew daily PRN  tamsulosin 0.4 milliGRAM(s) Oral at bedtime        FAMILY HISTORY:  No significant family history          Review of Systems:    General:  No wt loss, fevers, chills, night sweats,fatigue,   Eyes:  Good vision, no reported pain  ENT:  No sore throat, pain, runny nose, dysphagia  CV:  No pain, palpitatioins, hypo/hypertension  Resp:  No dyspnea, cough, tachypnea, wheezing  GI:  No pain, No nausea, No vomiting, No diarrhea, No constipatiion, No weight loss, No fever, No pruritis, No rectal bleeding, No tarry stools, No dysphagia,  :  No pain, bleeding, incontinence, nocturia  Muscle:  No pain, weakness  Breast:  No pain, abscess, mass, discharge  Neuro:  No weakness, tingling, memory problems  Psych:  No fatigue, insomnia, mood problems, depression  Endocrine:  No polyuria, polydypsia, cold/heat intolerance  Heme:  No petechiae, ecchymosis, easy bruisability  Skin:  No rash, tattoos, scars, edema    Relevant Family History:       Relevant Social History:       Physical Exam:    Vital Signs:  Vital Signs Last 24 Hrs  T(C): 36.2 (22 Sep 2020 10:42), Max: 37.1 (22 Sep 2020 02:36)  T(F): 97.2 (22 Sep 2020 10:42), Max: 98.7 (22 Sep 2020 02:36)  HR: 69 (22 Sep 2020 10:42) (69 - 82)  BP: 99/44 (22 Sep 2020 10:42) (99/44 - 133/70)  BP(mean): --  RR: 16 (22 Sep 2020 10:42) (16 - 16)  SpO2: 97% (22 Sep 2020 10:42) (94% - 97%)  Daily     Daily     General:  Appears stated age, well-groomed, well-nourished, no distress  HEENT:  NC/AT,  conjunctivae clear and pink, no thyromegaly, nodules, adenopathy, no JVD  Chest:  Full & symmetric excursion, no increased effort, breath sounds clear  Cardiovascular:  Regular rhythm, S1, S2, no murmur/rub/S3/S4, no abdominal bruit, no edema  Abdomen:  Soft, non-tender, non-distended, normoactive bowel sounds,  no masses ,no hepatosplenomeagaly, no signs of chronic liver disease  Extremities:  no cyanosis,clubbing or edema  Skin:  No rash/erythema/ecchymoses/petechiae/wounds/abscess/warm/dry  Neuro/Psych:  Alert, oriented, no asterixis, no tremor, no encephalopathy    Laboratory:                            8.8    9.05  )-----------( 267      ( 22 Sep 2020 06:08 )             27.1     09-22    133<L>  |  97  |  14  ----------------------------<  96  3.9   |  32<H>  |  1.42<H>    Ca    8.8      22 Sep 2020 06:08      Imaging:      Assessment:  81 y/o male, recent aortic aneurysm repair 1 month ago ([facility]), HTN, Emphysema, BPH, currently being tx for UTI/pneumonia  (on zosyn and Macrobid, last dose today of macrobid), hypothyroidism comes from [place] [facility] due to coughing up blood as of this morning.  AAOx3 and provided history. Endorses chronic cough 2/2 emphysema, typically clear/white phlegm however today noticed blood. denies additional symptoms including headache, fever, chills, cp, sob, abdominal pain, nausea, vomiting, diarrhea, hematuria, melena, syncope, or LOC. Continues to take ASA daily. GI consulted for hemoptysis and (+) FOBT    Anemia  -No further hemoptysis or FOBT   -Trending H/H, currently stable H/H 8.8/27.1  -c/w PPI     Pt seen on AM rounds with Dr. Lagunas

## 2020-09-23 NOTE — DISCHARGE NOTE PROVIDER - NSDCCPCAREPLAN_GEN_ALL_CORE_FT
PRINCIPAL DISCHARGE DIAGNOSIS  Diagnosis: Hemoptysis  Assessment and Plan of Treatment: You were admitted for blood in your sputum and low oxygen saturation due to fluids in the lung.   -You had a thoracentesis by a pulmonologist. You had fluid removed from the lungs  -Continue your Lasix  -Continue your home inhalers  -Follow up with your primary care physician within the week  -Follow up with your pulmonologist within the week  -You will need an outpatient CT chest in 1 month      SECONDARY DISCHARGE DIAGNOSES  Diagnosis: Protein calorie malnutrition  Assessment and Plan of Treatment: It is important to have supplemental protein drinks.   -Follow up with your primary care physician within the week

## 2020-09-23 NOTE — DISCHARGE NOTE PROVIDER - NSDCMRMEDTOKEN_GEN_ALL_CORE_FT
Afrin 0.05% nasal spray: 2 spray(s) nasal 2 times a day, As Needed  amiodarone 200 mg oral tablet: 1 tab(s) orally once a day  amLODIPine 10 mg oral tablet: 1 tab(s) orally once a day  Antioxidant Multiple Vitamins (A,D,E,K-intensive) and Minerals oral liquid: orally once a day  aspirin 81 mg oral tablet:  orally   atorvastatin 40 mg oral tablet: 1 tab(s) orally once a day  budesonide 0.5 mg/2 mL inhalation suspension: 2 milliliter(s) inhaled 2 times a day  Cymbalta 30 mg oral delayed release capsule: 1 cap(s) orally once a day  Flomax 0.4 mg oral capsule: 1 cap(s) orally 2 times a day  furosemide 40 mg oral tablet: 1 tab(s) orally once a day  gabapentin 100 mg oral capsule: 2 cap(s) orally once (at bedtime)  ipratropium 500 mcg/2.5 mL inhalation solution: 2.5 milliliter(s) inhaled 4 times a day  K-Dur 10 oral tablet, extended release: 1 tab(s) orally once a day  levalbuterol 0.63 mg/3 mL inhalation solution: 3 milliliter(s) inhaled 4 times a day  levothyroxine 100 mcg (0.1 mg) oral tablet: 1 tab(s) orally once a day  Metoprolol Tartrate 25 mg oral tablet: 1 tab(s) orally 2 times a day  Milk of Magnesia 8% oral suspension: 30 milliliter(s) orally once a day (at bedtime)  MiraLax oral powder for reconstitution:   omeprazole 40 mg oral delayed release capsule: 1 cap(s) orally once a day  Senna Lax 8.6 mg oral tablet: 2 tab(s) orally once a day (at bedtime)  Symbicort 160 mcg-4.5 mcg/inh inhalation aerosol: 2 puff(s) inhaled 2 times a day  Tylenol 500 mg oral tablet: 2 tab(s) orally every 8 hours, As Needed

## 2020-09-23 NOTE — DISCHARGE NOTE NURSING/CASE MANAGEMENT/SOCIAL WORK - PATIENT PORTAL LINK FT
You can access the FollowMyHealth Patient Portal offered by [facility] by registering at the following website: [email or web address]. By joining [employer]’s FollowMyHealth portal, you will also be able to view your health information using other applications (apps) compatible with our system.

## 2020-09-23 NOTE — PROGRESS NOTE ADULT - ASSESSMENT
79 y/o male, recent aortic aneurysm repair 1 month ago ([facility]), HTN, Emphysema, BPH, currently being tx for UTI/pneumonia  (on zosyn and Macrobid, last dose today of macrobid), hypothyroidism comes from [place] [facility] due to coughing up blood as of this morning.  AAOx3 and provided history. Endorses chronic cough 2/2 emphysema, typically clear/white phlegm however today noticed blood. Denies additional symptoms including headache, fever, chills, cp, sob, abdominal pain, nausea, vomiting, diarrhea, hematuria, melena, syncope, or LOC. Continues to take ASA daily. GI consulted for hemoptysis and (+) FOBT. Patient had no episodes of hemoptysis, H/H currently stable, no intervention needed at this time.    Anemia  -No further hemoptysis or FOBT   -Trending H/H, currently stable H/H 9.0/27.8  -c/w PPI     Pt seen on rounds with Dr. Bebo Osborn NP  Gastroenterology  GI cell [phone]

## 2020-09-23 NOTE — PROGRESS NOTE ADULT - ASSESSMENT
Physical Examination:  GENERAL:               Alert, Oriented, No acute distress.    HEENT:                     No JVD, Moist MM  PULM:                     Bilateral air entry, diminished to auscultation bilaterally, no significant sputum production, + Rales, No Rhonchi, No Wheezing  CVS:                         S1, S2,  + Murmur  ABD:                        Soft, nondistended, nontender, normoactive bowel sounds,   NEURO:                  Alert, oriented, interactive, nonfocal, follows commands  PSYC:                      Calm, + Insight.        Assessment    81 y/o male with history of smoking, recent aortic aneurysm repair presenting with episodes of hemoptysis. Noted with right upper lobe atelectasis, ? mass vs. collapsed lung. Recently had CT scan at [facility]. Does not recall being told about abnormality of right upper lobe.     As per discharge records obtained from [facility]. Recent bronchoscopy performed at [facility] in June. No mention of endobronchial lesions. Awaiting CT scan of the chest.     Problem List  1. Hemoptysis - ? Bronchitis vs. PNA h/o in past with Bronch 6/3/2020- RML Blood clot.\par2. Right upper lobe collapse ? plug vs from effusion  3. Emphysema Ex smoker  4. Aortic aneurysm , s/p repair   5. B/L Right > Left Pleural Effusion s/p Right Thoracentesis 9/21 Transudate  6. Abnormal CT chest with ? Endobronchial lesion vs secretions.  7.Hypertension    Plan  - Monitor for further hemoptysis  - Appears had minimal episode of bleeding and currently resolved  - After discussion with Dr. Perez, with h/o bronch with no endobronchial lesion and Transudative pleural effusion, low prob of acute malignancy at this time.     With risks > Benefits of acute bronchoscopy     will hold off invasive procedure and opt for repeat CT in 1 month.     - Chest PT  - Cont. home COPD meds, mainly Symbicort and prn albuterol   - Bp Control  - Discussed with Dr. Desouza - can start dispo planning back to [facility]  - DTR Kaur Paged again 9/23 - awaiting call back . [phone]

## 2020-09-23 NOTE — PROGRESS NOTE ADULT - SUBJECTIVE AND OBJECTIVE BOX
Sitting up in the chair. Denies any abdominal pain, no further hemoptysis or bright red stool      HPI:  79 y/o male, recent aortic aneurysm repair 1 month ago ([facility]), HTN, Emphysema, BPH, currently being tx for UTI/pneumonia  (on zosyn and Macrobid, last dose today of macrobid), hypothyroidism comes from [place] [facility] due to coughing up blood as of this morning.  AAOx3 and provided history. Endorses chronic cough 2/2 emphysema, typically clear/white phlegm however today noticed blood. denies additional symptoms including headache, fever, chills, cp, sob, abdominal pain, nausea, vomiting, diarrhea, hematuria, melena, syncope, or LOC. Continues to take ASA daily  Fam Hx: mother  age unknown - emphysema. father  70s - emphysema (18 Sep 2020 19:10)      No Known Allergies      acetaminophen   Tablet .. 650 milliGRAM(s) Oral every 8 hours PRN  aMIOdarone    Tablet 200 milliGRAM(s) Oral daily  amLODIPine   Tablet 10 milliGRAM(s) Oral daily  aspirin  chewable 81 milliGRAM(s) Oral daily  atorvastatin 40 milliGRAM(s) Oral at bedtime  budesonide  80 MICROgram(s)/formoterol 4.5 MICROgram(s) Inhaler 2 Puff(s) Inhalation two times a day  DULoxetine 30 milliGRAM(s) Oral daily  furosemide    Tablet 40 milliGRAM(s) Oral daily  gabapentin 100 milliGRAM(s) Oral at bedtime  influenza   Vaccine 0.5 milliLiter(s) IntraMuscular once  ipratropium 17 MICROgram(s) HFA Inhaler 1 Puff(s) Inhalation every 6 hours  levalbuterol for Nebulization - Peds 0.63 milliGRAM(s) Nebulizer four times a day  levothyroxine 100 MICROGram(s) Oral daily  magnesium hydroxide Suspension 30 milliLiter(s) Oral at bedtime PRN  metoprolol tartrate 25 milliGRAM(s) Oral two times a day  pantoprazole    Tablet 40 milliGRAM(s) Oral before breakfast  potassium chloride    Tablet ER 10 milliEquivalent(s) Oral daily  senna 8.6 milliGRAM(s) Oral Tablet - Peds 2 Tablet(s) Oral at bedtime  simethicone 80 milliGRAM(s) Chew daily PRN  tamsulosin 0.4 milliGRAM(s) Oral at bedtime        FAMILY HISTORY:  No significant family history          Review of Systems:    General:  No wt loss, fevers, chills, night sweats,fatigue,   Eyes:  Good vision, no reported pain  ENT:  No sore throat, pain, runny nose, dysphagia  CV:  No pain, palpitatioins, hypo/hypertension  Resp:  No dyspnea, cough, tachypnea, wheezing  GI:  No pain, No nausea, No vomiting, No diarrhea, No constipatiion, No weight loss, No fever, No pruritis, No rectal bleeding, No tarry stools, No dysphagia,  :  No pain, bleeding, incontinence, nocturia  Muscle:  No pain, weakness  Breast:  No pain, abscess, mass, discharge  Neuro:  No weakness, tingling, memory problems  Psych:  No fatigue, insomnia, mood problems, depression  Endocrine:  No polyuria, polydypsia, cold/heat intolerance  Heme:  No petechiae, ecchymosis, easy bruisability  Skin:  No rash, tattoos, scars, edema    Relevant Family History:       Relevant Social History:       Physical Exam:    Vital Signs:  Vital Signs Last 24 Hrs  T(C): 36.6 (21 Sep 2020 09:24), Max: 36.9 (20 Sep 2020 20:49)  T(F): 97.9 (21 Sep 2020 09:24), Max: 98.4 (20 Sep 2020 20:49)  HR: 65 (21 Sep 2020 09:24) (65 - 83)  BP: 117/68 (21 Sep 2020 09:24) (114/64 - 135/69)  BP(mean): --  RR: 16 (21 Sep 2020 09:24) (16 - 17)  SpO2: 97% (21 Sep 2020 09:24) (92% - 97%)  Daily     Daily Weight in k (21 Sep 2020 05:17)    General:  Appears stated age, well-groomed, well-nourished, no distress  HEENT:  NC/AT,  conjunctivae clear and pink, no thyromegaly, nodules, adenopathy, no JVD  Chest:  Full & symmetric excursion, no increased effort, breath sounds clear  Cardiovascular:  Regular rhythm, S1, S2, no murmur/rub/S3/S4, no abdominal bruit, no edema  Abdomen:  Soft, non-tender, non-distended, normoactive bowel sounds,  no masses ,no hepatosplenomeagaly, no signs of chronic liver disease  Extremities:  no cyanosis,clubbing or edema  Skin:  No rash/erythema/ecchymoses/petechiae/wounds/abscess/warm/dry  Neuro/Psych:  Alert, oriented, no asterixis, no tremor, no encephalopathy    Laboratory:                            9.1    9.50  )-----------( 253      ( 21 Sep 2020 07:00 )             27.7     09-    132<L>  |  96  |  13  ----------------------------<  86  3.9   |  28  |  1.28    Ca    8.9      21 Sep 2020 07:00              Imaging:      Assessment:   79 y/o male, recent aortic aneurysm repair 1 month ago ([facility]), HTN, Emphysema, BPH, currently being tx for UTI/pneumonia  (on zosyn and Macrobid, last dose today of macrobid), hypothyroidism comes from [place] [facility] due to coughing up blood as of this morning.  AAOx3 and provided history. Endorses chronic cough 2/2 emphysema, typically clear/white phlegm however today noticed blood. denies additional symptoms including headache, fever, chills, cp, sob, abdominal pain, nausea, vomiting, diarrhea, hematuria, melena, syncope, or LOC. Continues to take ASA daily. GI consulted for hemoptysis and (+) FOBT    Anemia  -No further hemoptysis or FOBT   -Trending H/H, currently stable H/H 9.1/27.7  -c/w PPI     Pt seen on AM rounds with Dr. Lagunas       Plan:       
Follow-up Pulmonary Progress Note  Chief Complaint : Hemoptysis        patient seen and examined  sates no cough or hemoptysis in weeks  feels well  on 2 L n/c  comfortable.       Allergies :No Known Allergies      PAST MEDICAL & SURGICAL HISTORY:  Aortic aneurysm    Heart disease  sp cardiac cath with stent placement    BPH (benign prostatic hyperplasia)    Hypertension    S/P coronary artery stent placement  1 stent- blocked artery        Medications:  MEDICATIONS  (STANDING):  aMIOdarone    Tablet 200 milliGRAM(s) Oral daily  amLODIPine   Tablet 10 milliGRAM(s) Oral daily  aspirin  chewable 81 milliGRAM(s) Oral daily  atorvastatin 40 milliGRAM(s) Oral at bedtime  budesonide  80 MICROgram(s)/formoterol 4.5 MICROgram(s) Inhaler 2 Puff(s) Inhalation two times a day  DULoxetine 30 milliGRAM(s) Oral daily  furosemide    Tablet 40 milliGRAM(s) Oral daily  gabapentin 100 milliGRAM(s) Oral at bedtime  influenza   Vaccine 0.5 milliLiter(s) IntraMuscular once  ipratropium 17 MICROgram(s) HFA Inhaler 1 Puff(s) Inhalation every 6 hours  levalbuterol for Nebulization - Peds 0.63 milliGRAM(s) Nebulizer four times a day  levothyroxine 100 MICROGram(s) Oral daily  metoprolol tartrate 25 milliGRAM(s) Oral two times a day  pantoprazole    Tablet 40 milliGRAM(s) Oral before breakfast  potassium chloride    Tablet ER 10 milliEquivalent(s) Oral daily  senna 8.6 milliGRAM(s) Oral Tablet - Peds 2 Tablet(s) Oral at bedtime  tamsulosin 0.4 milliGRAM(s) Oral at bedtime    MEDICATIONS  (PRN):  acetaminophen   Tablet .. 650 milliGRAM(s) Oral every 8 hours PRN Mild Pain (1 - 3), Moderate Pain (4 - 6), Severe Pain (7 - 10)  magnesium hydroxide Suspension 30 milliLiter(s) Oral at bedtime PRN Constipation  simethicone 80 milliGRAM(s) Chew daily PRN Gas      LABS:                        9.1    9.50  )-----------( 253      ( 21 Sep 2020 07:00 )             27.7     09-21    132<L>  |  96  |  13  ----------------------------<  86  3.9   |  28  |  1.28    Ca    8.9      21 Sep 2020 07:00      CULTURES: (if applicable)    Culture - Urine (collected 09-18-20 @ 12:10)  Source: .Urine Clean Catch (Midstream)  Final Report (09-19-20 @ 11:29):    <10,000 CFU/mL Normal Urogenital Tari    < from: CT Angio Abdomen and Pelvis w/ Oral Cont and w/ IV Cont (09.18.20 @ 13:32) >    EXAM:  CT ANGIO CHEST (W)AW IC    EXAM:  CT ANGIO ABD PELV OC (W)AW IC      *** ADDENDUM 09/18/2020  ***    Addendum:    Correction:    Stent grafts are present in the SMA, celiac axis and bilateral renal arteries. The SMA, celiac axis and left renal artery stent grafts are patent. The right is occluded.    Findings discussed with Dr. Benoit Carmona at 3:30 PM on 9/18/2020.    *** END OF ADDENDUM 09/18/2020  ***      PROCEDURE DATE:  09/18/2020        INTERPRETATION:  CLINICAL INDICATION: 80 years  Male with r/o dissection. Aortic aneurysm repair one month ago now coughing blood    COMPARISON: None.    PROCEDURE:  CT Angiography of the Chest, Abdomen and Pelvis.  Precontrast imaging was performed through the chest followed by arterial phase imaging of the chest, abdomen and pelvis.  Intravenous contrast: 90 ml Omnipaque 350. 10 ml discarded.  Oral contrast: None.  Sagittal and coronal reformats were performed as well as 3D (MIP) reconstructions.    FINDINGS:  CHEST:  LUNGS AND LARGE AIRWAYS: 8 mm soft tissue focus in the proximal right mainstem bronchus, either mucous secretions or polypoid mass (2:51). Otherwise patent central airways. No extravasated contrast and the airways. Right middle lobe and right lower lobe compressive atelectasis. Partial left lower lobe compressive atelectasis. Bilateral upper lobe centrilobular emphysema.  PLEURA: Moderate to large bilateral pleural effusions, greater on the right. The right pneumothorax is partially loculated medially in the upperhemithorax. No pneumothorax.  VESSELS: Ascending aorta repair. Stent graft extending from the mid descending thoracic aorta into the abdomen complete. Please refer to abdominal vessel findings. HEART: Moderate cardiomegaly. Coronary atherosclerosis. No pericardial effusion.  MEDIASTINUM AND MICHELLE: No lymphadenopathy. There is high density material in the esophagus on both the pre and postcontrast imaging likely related to ingested material.  CHEST WALL AND LOWER NECK: Within normal limits.    ABDOMEN AND PELVIS:  LIVER: Within normal limits.  BILE DUCTS: Normal caliber.  GALLBLADDER: Within normal limits.  SPLEEN: Within normal limits.  PANCREAS: Within normal limits.  ADRENALS: Within normal limits.  KIDNEYS/URETERS: Atrophic right kidney with little arterial enhancement secondary to occluded graft. The left kidney is perfused. There is a 4 mm nonobstructing left lower pole intrarenal calculus. There is no hydroureteronephrosis bilaterally.    BLADDER: Diffuse wall thickening.  REPRODUCTIVE ORGANS: Gil catheter balloon is inflated in the prostate. The prostate gland is normal in size. Fiducial markers are present.    BOWEL: No bowel obstruction. Appendix is not visualized and cannot be assessed.. Moderate retained fecal matter throughout the colon.  PERITONEUM: Mild ascites. No pneumoperitoneum.  VESSELS: Aortic stent graft extending from the aortic root into the abdominal aorta. The graft bifurcates below the diaphragm. And extends into the common iliac arteries bilaterally. There has been bypass of the SMA and celiac axis. There is a stent in the right renal artery which is occluded. There is an endoleak in the mid to distal abdominal aorta measuring up to 1.4 cm (series 3:162), that appears to be fed by retrograde flow inthe inferior mesenteric artery. The aortic stent graft is otherwise patent. The native infrarenal abdominal aortic aneurysm measures up to 6.1 cm with thrombus outside of the stent. At the level of the diaphragmatic hiatus the native aneurysm measures up to 7 cm. The stented left common iliac artery lumen measures 2.3 cm and the right 1.5 cm.  RETROPERITONEUM/LYMPH NODES: No lymphadenopathy.  ABDOMINAL WALL: Within normal limits.  BONES: Within normal limits.    IMPRESSION:    8 mm soft tissue focus in the proximal right mainstem bronchus, either mucous secretions or polypoid mass (2:51    Moderate to large bilateral pleural effusions, greater on the right, with underlying compressive atelectasis of the right middle lobe and bilateral lower lobes. Emphysema. The right pleural effusion is partially loculated.    Ascending aortic aneurysm repair. No evidence of fistula formation between the aorta and airway. High density material in the esophagus is likely ingested as it is present prior toIV contrast administration.    Aortic stent graft extending from the mid descending thoracic aorta to the common iliac arteries. An endoleak is present in the distal abdominal aorta likely fed by retrograde flow in the inferior mesenteric artery.    The SMA and celiac axis have been bypassed. There is an occluded right renal artery stent with right renal atrophy.    Moderately constipated colon. High density material in the esophagus is likely ingested as it is present prior to IV contrast administration.    Cardiomegaly. Trace pericardial effusion.    Nonobstructing left renal calculus.    Gil balloon is inflated in the prostate and should be repositioned. Fiducial markers.    Bladder wall thickening secondary to under distention or cystitis.    Findings were discussed with Dr. YAZ CUEVAS [phone] 9/18/2020 2:50 PM by Dr. Dia with read back confirmation.      ***Please see the addendum at the top of this report. It may contain additional important information or changes.****        < end of copied text >      VITALS:  T(C): 36.6 (09-21-20 @ 09:24), Max: 36.9 (09-20-20 @ 20:49)  T(F): 97.9 (09-21-20 @ 09:24), Max: 98.4 (09-20-20 @ 20:49)  HR: 65 (09-21-20 @ 09:24) (65 - 83)  BP: 117/68 (09-21-20 @ 09:24) (114/64 - 135/69)  BP(mean): --  ABP: --  ABP(mean): --  RR: 16 (09-21-20 @ 09:24) (16 - 17)  SpO2: 97% (09-21-20 @ 09:24) (92% - 97%)  CVP(mm Hg): --  CVP(cm H2O): --    Ins and Outs     09-20-20 @ 07:01  -  09-21-20 @ 07:00  --------------------------------------------------------  IN: 220 mL / OUT: 1401 mL / NET: -1181 mL    09-21-20 @ 07:01  -  09-21-20 @ 12:15  --------------------------------------------------------  IN: 240 mL / OUT: 0 mL / NET: 240 mL        Height (cm): 182.9 (09-18-20 @ 21:08)  Weight (kg): 62.5 (09-18-20 @ 21:08)  BMI (kg/m2): 18.7 (09-18-20 @ 21:08)        I&O's Detail    20 Sep 2020 07:01  -  21 Sep 2020 07:00  --------------------------------------------------------  IN:    Oral Fluid: 220 mL  Total IN: 220 mL    OUT:    Indwelling Catheter - Urethral (mL): 1200 mL    Voided (mL): 201 mL  Total OUT: 1401 mL    Total NET: -1181 mL      21 Sep 2020 07:01  -  21 Sep 2020 12:15  --------------------------------------------------------  IN:    Oral Fluid: 240 mL  Total IN: 240 mL    OUT:  Total OUT: 0 mL    Total NET: 240 mL      
Follow-up Pulmonary Progress Note  Chief Complaint : Hemoptysis        pt feels well  no cp, sob, palp  no more hemoptysis    risks/benefits for invasive procedure of bronchoscopy explained at this time will hold of as no more hemoptysis noted.         Allergies :No Known Allergies      PAST MEDICAL & SURGICAL HISTORY:  Aortic aneurysm    Heart disease  sp cardiac cath with stent placement    BPH (benign prostatic hyperplasia)    Hypertension    S/P coronary artery stent placement  1 stent- blocked artery        Medications:  MEDICATIONS  (STANDING):  ALBUTerol    90 MICROgram(s) HFA Inhaler 2 Puff(s) Inhalation every 6 hours  aMIOdarone    Tablet 200 milliGRAM(s) Oral daily  amLODIPine   Tablet 10 milliGRAM(s) Oral daily  aspirin  chewable 81 milliGRAM(s) Oral daily  atorvastatin 40 milliGRAM(s) Oral at bedtime  budesonide  80 MICROgram(s)/formoterol 4.5 MICROgram(s) Inhaler 2 Puff(s) Inhalation two times a day  DULoxetine 30 milliGRAM(s) Oral daily  furosemide    Tablet 40 milliGRAM(s) Oral daily  gabapentin 100 milliGRAM(s) Oral at bedtime  influenza   Vaccine 0.5 milliLiter(s) IntraMuscular once  ipratropium 17 MICROgram(s) HFA Inhaler 2 Puff(s) Inhalation every 6 hours  levothyroxine 100 MICROGram(s) Oral daily  magnesium hydroxide Suspension 30 milliLiter(s) Oral at bedtime  metoprolol tartrate 25 milliGRAM(s) Oral two times a day  pantoprazole    Tablet 40 milliGRAM(s) Oral before breakfast  potassium chloride    Tablet ER 10 milliEquivalent(s) Oral daily  senna 8.6 milliGRAM(s) Oral Tablet - Peds 2 Tablet(s) Oral at bedtime  tamsulosin 0.4 milliGRAM(s) Oral at bedtime    MEDICATIONS  (PRN):  acetaminophen   Tablet .. 650 milliGRAM(s) Oral every 8 hours PRN Mild Pain (1 - 3), Moderate Pain (4 - 6), Severe Pain (7 - 10)  simethicone 80 milliGRAM(s) Chew daily PRN Gas      LABS:                        8.8    9.05  )-----------( 267      ( 22 Sep 2020 06:08 )             27.1     09-22    133<L>  |  97  |  14  ----------------------------<  96  3.9   |  32<H>  |  1.42<H>    Ca    8.8      22 Sep 2020 06:08      Fluid characteristics  -- 09-21 @ 13:06  pH 7.7  LDH 80  tprot 2.3    Cell count  Appearance Clear  Fluid type --  BF lymph 25  color Yellow  eosinophil 0  PMN 5  Mesothelial 0  Monocyte 70  Other body cells 0                      CULTURES: (if applicable)    Culture - Fungal, Body Fluid (collected 09-21-20 @ 13:06)  Source: .Body Fluid Pleural Fluid  Preliminary Report (09-22-20 @ 10:08):    Testing in progress    Culture - Body Fluid with Gram Stain (collected 09-21-20 @ 13:06)  Source: .Body Fluid Pleural Fluid  Gram Stain (09-21-20 @ 21:19):    No polymorphonuclear leukocytes seen    No organisms seen    by cytocentrifuge    Culture - Urine (collected 09-18-20 @ 12:10)  Source: .Urine Clean Catch (Midstream)  Final Report (09-19-20 @ 11:29):    <10,000 CFU/mL Normal Urogenital Tari        < from: Xray Chest 1 View-PORTABLE IMMEDIATE (Xray Chest 1 View-PORTABLE IMMEDIATE .) (09.21.20 @ 15:15) >  EXAM:  XR CHEST PORTABLE IMMED 1V      PROCEDURE DATE:  09/21/2020        INTERPRETATION:  AP chest on September 21, 2020 at 2:34 PM. Patient had thoracentesis.    Heart enlargement, prominent aorta, and stent graft in the descending thoracic aortaagain noted.    There is a diminished right effusion compared to September 18 consistent with thoracentesis. Slight right base remaining fluid and associated atelectasis remain. There is no pneumothorax.    The present film shows increasing left effusion.    IMPRESSION: Diminished right effusion after thoracentesis. There is some increase in the left effusion.          < end of copied text >    Lights Criteria :    Trend LDH- Fluid  09-21-20 @ 13:06  80 -- --      Trend LDH - Serum  09-22-20 @ 06:08  217 [50 - 242]    _____________________  Trend Protein - Fluid   09-21-20 @ 13:06  2.3  --  --      Trend Protein - Serum  09-18-20 @ 11:34  6.7 [6.0 - 8.3]      _____________________  The effusion is exudative if one of the Light’s criteria has been met:   1)  Pleural fluid protein/serum protein ratio > 0.5.   2)  Pleural fluid LDH/serum LDH ratio greater than > 0.6.   3) Pleural fluid LDH level greater than 2/3 of upper limit of normal LDH level in serum.  _____________________ NONE HAVE BEEN MET        VITALS:  T(C): 36.2 (09-22-20 @ 10:42), Max: 37.1 (09-22-20 @ 02:36)  T(F): 97.2 (09-22-20 @ 10:42), Max: 98.7 (09-22-20 @ 02:36)  HR: 69 (09-22-20 @ 10:42) (69 - 82)  BP: 99/44 (09-22-20 @ 10:42) (99/44 - 133/70)  BP(mean): --  ABP: --  ABP(mean): --  RR: 16 (09-22-20 @ 10:42) (16 - 16)  SpO2: 97% (09-22-20 @ 10:42) (94% - 97%)  CVP(mm Hg): --  CVP(cm H2O): --    Ins and Outs     09-21-20 @ 07:01  -  09-22-20 @ 07:00  --------------------------------------------------------  IN: 240 mL / OUT: 1050 mL / NET: -810 mL                I&O's Detail    21 Sep 2020 07:01  -  22 Sep 2020 07:00  --------------------------------------------------------  IN:    Oral Fluid: 240 mL  Total IN: 240 mL    OUT:    Indwelling Catheter - Urethral (mL): 1050 mL  Total OUT: 1050 mL    Total NET: -810 mL  
Follow-up Pulmonary Progress Note  Chief Complaint : Hemoptysis      pt seen and examined  comfortable  RA sat 88 %    dtr called again goes to voice mail, not received call back yet      Allergies :No Known Allergies      PAST MEDICAL & SURGICAL HISTORY:  Aortic aneurysm    Heart disease  sp cardiac cath with stent placement    BPH (benign prostatic hyperplasia)    Hypertension    S/P coronary artery stent placement  1 stent- blocked artery        Medications:  MEDICATIONS  (STANDING):  ALBUTerol    90 MICROgram(s) HFA Inhaler 2 Puff(s) Inhalation every 6 hours  aMIOdarone    Tablet 200 milliGRAM(s) Oral daily  amLODIPine   Tablet 10 milliGRAM(s) Oral daily  aspirin  chewable 81 milliGRAM(s) Oral daily  atorvastatin 40 milliGRAM(s) Oral at bedtime  budesonide  80 MICROgram(s)/formoterol 4.5 MICROgram(s) Inhaler 2 Puff(s) Inhalation two times a day  DULoxetine 30 milliGRAM(s) Oral daily  furosemide    Tablet 40 milliGRAM(s) Oral daily  gabapentin 100 milliGRAM(s) Oral at bedtime  influenza   Vaccine 0.5 milliLiter(s) IntraMuscular once  ipratropium 17 MICROgram(s) HFA Inhaler 2 Puff(s) Inhalation every 6 hours  levothyroxine 100 MICROGram(s) Oral daily  magnesium hydroxide Suspension 30 milliLiter(s) Oral at bedtime  metoprolol tartrate 25 milliGRAM(s) Oral two times a day  pantoprazole    Tablet 40 milliGRAM(s) Oral before breakfast  potassium chloride    Tablet ER 10 milliEquivalent(s) Oral daily  senna 8.6 milliGRAM(s) Oral Tablet - Peds 2 Tablet(s) Oral at bedtime  tamsulosin 0.4 milliGRAM(s) Oral at bedtime    MEDICATIONS  (PRN):  acetaminophen   Tablet .. 650 milliGRAM(s) Oral every 8 hours PRN Mild Pain (1 - 3), Moderate Pain (4 - 6), Severe Pain (7 - 10)  simethicone 80 milliGRAM(s) Chew daily PRN Gas      LABS:                        9.0    8.99  )-----------( 305      ( 23 Sep 2020 09:10 )             27.8     09-23    133<L>  |  98  |  14  ----------------------------<  87  3.6   |  28  |  1.18    Ca    8.8      23 Sep 2020 07:05      Fluid characteristics  -- 09-21 @ 13:06  pH 7.7  LDH 80  tprot 2.3    Cell count  Appearance Clear  Fluid type --  BF lymph 25  color Yellow  eosinophil 0  PMN 5  Mesothelial 0  Monocyte 70  Other body cells 0      CULTURES: (if applicable)    Culture - Fungal, Body Fluid (collected 09-21-20 @ 13:06)  Source: .Body Fluid Pleural Fluid  Preliminary Report (09-22-20 @ 10:08):    Testing in progress    Culture - Body Fluid with Gram Stain (collected 09-21-20 @ 13:06)  Source: .Body Fluid Pleural Fluid  Gram Stain (09-21-20 @ 21:19):    No polymorphonuclear leukocytes seen    No organisms seen    by cytocentrifuge  Preliminary Report (09-22-20 @ 16:51):    No growth    Culture - Urine (collected 09-18-20 @ 12:10)  Source: .Urine Clean Catch (Midstream)  Final Report (09-19-20 @ 11:29):    <10,000 CFU/mL Normal Urogenital Tari    VITALS:  T(C): 36.9 (09-23-20 @ 08:39), Max: 36.9 (09-23-20 @ 05:05)  T(F): 98.4 (09-23-20 @ 08:39), Max: 98.5 (09-23-20 @ 05:05)  HR: 79 (09-23-20 @ 08:39) (70 - 92)  BP: 130/66 (09-23-20 @ 08:39) (115/62 - 137/72)  BP(mean): --  ABP: --  ABP(mean): --  RR: 16 (09-23-20 @ 05:05) (16 - 23)  SpO2: 90% (09-23-20 @ 08:39) (90% - 97%)  CVP(mm Hg): --  CVP(cm H2O): --    Ins and Outs     09-22-20 @ 07:01  -  09-23-20 @ 07:00  --------------------------------------------------------  IN: 400 mL / OUT: 300 mL / NET: 100 mL                I&O's Detail    22 Sep 2020 07:01  -  23 Sep 2020 07:00  --------------------------------------------------------  IN:    Oral Fluid: 400 mL  Total IN: 400 mL    OUT:    Indwelling Catheter - Urethral (mL): 300 mL  Total OUT: 300 mL    Total NET: 100 mL    
INTERVAL HPI/OVERNIGHT EVENTS: No overnight events. Patient seen and examined at bedside. Patient has no complaints.    HPI:  81 y/o male, recent aortic aneurysm repair 1 month ago ([facility]), HTN, Emphysema, BPH, currently being tx for UTI/pneumonia  (on zosyn and Macrobid, last dose today of macrobid), hypothyroidism comes from [place] [facility] due to coughing up blood as of this morning.  AAOx3 and provided history. Endorses chronic cough 2/2 emphysema, typically clear/white phlegm however today noticed blood.l denies additional symptoms including headache, fever, chills, cp, sob, abdominal pain, nausea, vomiting, diarrhea, hematuria, melena, syncope, or LOC. Continues to take ASA daily  Fam Hx: mother  age unknown - emphysema. father  70s - emphysema (18 Sep 2020 19:10)    MEDICATIONS  (STANDING):  ALBUTerol    90 MICROgram(s) HFA Inhaler 2 Puff(s) Inhalation every 6 hours  aMIOdarone    Tablet 200 milliGRAM(s) Oral daily  amLODIPine   Tablet 10 milliGRAM(s) Oral daily  aspirin  chewable 81 milliGRAM(s) Oral daily  atorvastatin 40 milliGRAM(s) Oral at bedtime  budesonide  80 MICROgram(s)/formoterol 4.5 MICROgram(s) Inhaler 2 Puff(s) Inhalation two times a day  DULoxetine 30 milliGRAM(s) Oral daily  furosemide    Tablet 40 milliGRAM(s) Oral daily  gabapentin 100 milliGRAM(s) Oral at bedtime  influenza   Vaccine 0.5 milliLiter(s) IntraMuscular once  ipratropium 17 MICROgram(s) HFA Inhaler 2 Puff(s) Inhalation every 6 hours  levothyroxine 100 MICROGram(s) Oral daily  magnesium hydroxide Suspension 30 milliLiter(s) Oral at bedtime  metoprolol tartrate 25 milliGRAM(s) Oral two times a day  pantoprazole    Tablet 40 milliGRAM(s) Oral before breakfast  potassium chloride    Tablet ER 10 milliEquivalent(s) Oral daily  senna 8.6 milliGRAM(s) Oral Tablet - Peds 2 Tablet(s) Oral at bedtime  tamsulosin 0.4 milliGRAM(s) Oral at bedtime    MEDICATIONS  (PRN):  acetaminophen   Tablet .. 650 milliGRAM(s) Oral every 8 hours PRN Mild Pain (1 - 3), Moderate Pain (4 - 6), Severe Pain (7 - 10)  simethicone 80 milliGRAM(s) Chew daily PRN Gas      Allergies    No Known Allergies    Intolerances      PHYSICAL EXAM:   Vital Signs:  Vital Signs Last 24 Hrs  T(C): 36.9 (23 Sep 2020 08:39), Max: 36.9 (23 Sep 2020 05:05)  T(F): 98.4 (23 Sep 2020 08:39), Max: 98.5 (23 Sep 2020 05:05)  HR: 79 (23 Sep 2020 08:39) (70 - 92)  BP: 130/66 (23 Sep 2020 08:39) (115/62 - 137/72)  BP(mean): --  RR: 16 (23 Sep 2020 05:05) (16 - 23)  SpO2: 90% (23 Sep 2020 08:39) (90% - 97%)  Daily     Daily Weight in k.7 (23 Sep 2020 05:05)I&O's Summary    22 Sep 2020 07:01  -  23 Sep 2020 07:00  --------------------------------------------------------  IN: 400 mL / OUT: 300 mL / NET: 100 mL        GENERAL:  NAD, frail elderly gentleman  HEENT:  NC/AT,  conjunctivae clear and pink, no thyromegaly, nodules, adenopathy, no JVD, sclera -anicteric  CHEST:  Full & symmetric excursion, no increased effort, breath sounds clear decrease b/l bases  HEART:  Regular rhythm, S1, S2, murmur /rub/S3/S4, no abdominal bruit, no edema  ABDOMEN:  Soft, non-tender, non-distended, normoactive bowel sounds,  no masses ,no hepato-splenomegaly, no signs of chronic liver disease  EXTREMITIES:  no cyanosis, clubbing or edema  SKIN:  No rash/erythema/ecchymoses/petechiae/warm/dry  NEURO:  Alert, oriented, no asterixis, no tremor, no encephalopathy      LABS:                        9.0    8.99  )-----------( 305      ( 23 Sep 2020 09:10 )             27.8     09-    133<L>  |  98  |  14  ----------------------------<  87  3.6   |  28  |  1.18    Ca    8.8      23 Sep 2020 07:05          amylase   lipase  RADIOLOGY & ADDITIONAL TESTS:    < from: CT Angio Abdomen and Pelvis w/ Oral Cont and w/ IV Cont (20 @ 13:32) >    EXAM:  CT ANGIO CHEST (W)AW IC    EXAM:  CT ANGIO ABD PELV OC (W)AW IC      *** ADDENDUM 2020  ***    Addendum:    Correction:    Stent grafts are present in the SMA, celiac axis and bilateral renal arteries. The SMA, celiac axis and left renal artery stent grafts are patent. The right is occluded.    Findings discussed with Dr. Benoit Carmona at 3:30 PM on 2020.    *** END OF ADDENDUM 2020  ***      PROCEDURE DATE:  2020        INTERPRETATION:  CLINICAL INDICATION: 80 years  Male with r/o dissection. Aortic aneurysm repair one month ago now coughing blood    COMPARISON: None.    PROCEDURE:  CT Angiography of the Chest, Abdomen and Pelvis.  Precontrast imaging was performed through the chest followed by arterial phase imaging of the chest, abdomen and pelvis.  Intravenous contrast: 90 ml Omnipaque 350. 10 ml discarded.  Oral contrast: None.  Sagittal and coronal reformats were performed as well as 3D (MIP) reconstructions.    FINDINGS:  CHEST:  LUNGS AND LARGE AIRWAYS: 8 mm soft tissue focus in the proximal right mainstem bronchus, either mucous secretions or polypoid mass (2:51). Otherwise patent central airways. No extravasated contrast and the airways. Right middle lobe and right lower lobe compressive atelectasis. Partial left lower lobe compressive atelectasis. Bilateral upper lobe centrilobular emphysema.  PLEURA: Moderate to large bilateral pleural effusions, greater on the right. The right pneumothorax is partially loculated medially in the upperhemithorax. No pneumothorax.  VESSELS: Ascending aorta repair. Stent graft extending from the mid descending thoracic aorta into the abdomen complete. Please refer to abdominal vessel findings. HEART: Moderate cardiomegaly. Coronary atherosclerosis. No pericardial effusion.  MEDIASTINUM AND MICHELLE: No lymphadenopathy. There is high density material in the esophagus on both the pre and postcontrast imaging likely related to ingested material.  CHEST WALL AND LOWER NECK: Within normal limits.    ABDOMEN AND PELVIS:  LIVER: Within normal limits.  BILE DUCTS: Normal caliber.  GALLBLADDER: Within normal limits.  SPLEEN: Within normal limits.  PANCREAS: Within normal limits.  ADRENALS: Within normal limits.  KIDNEYS/URETERS: Atrophic right kidney with little arterial enhancement secondary to occluded graft. The left kidney is perfused. There is a 4 mm nonobstructing left lower pole intrarenal calculus. There is no hydroureteronephrosis bilaterally.    BLADDER: Diffuse wall thickening.  REPRODUCTIVE ORGANS: Gil catheter balloon is inflated in the prostate. The prostate gland is normal in size. Fiducial markers are present.    BOWEL: No bowel obstruction. Appendix is not visualized and cannot be assessed.. Moderate retained fecal matter throughout the colon.  PERITONEUM: Mild ascites. No pneumoperitoneum.  VESSELS: Aortic stent graft extending from the aortic root into the abdominal aorta. The graft bifurcates below the diaphragm. And extends into the common iliac arteries bilaterally. There has been bypass of the SMA and celiac axis. There is a stent in the right renal artery which is occluded. There is an endoleak in the mid to distal abdominal aorta measuring up to 1.4 cm (series 3:162), that appears to be fed by retrograde flow inthe inferior mesenteric artery. The aortic stent graft is otherwise patent. The native infrarenal abdominal aortic aneurysm measures up to 6.1 cm with thrombus outside of the stent. At the level of the diaphragmatic hiatus the native aneurysm measures up to 7 cm. The stented left common iliac artery lumen measures 2.3 cm and the right 1.5 cm.  RETROPERITONEUM/LYMPH NODES: No lymphadenopathy.  ABDOMINAL WALL: Within normal limits.  BONES: Within normal limits.    IMPRESSION:    8 mm soft tissue focus in the proximal right mainstem bronchus, either mucous secretions or polypoid mass (2:51    Moderate to large bilateral pleural effusions, greater on the right, with underlying compressive atelectasis of the right middle lobe and bilateral lower lobes. Emphysema. The right pleural effusion is partially loculated.    Ascending aortic aneurysm repair. No evidence of fistula formation between the aorta and airway. High density material in the esophagus is likely ingested as it is present prior toIV contrast administration.    Aortic stent graft extending from the mid descending thoracic aorta to the common iliac arteries. An endoleak is present in the distal abdominal aorta likely fed by retrograde flow in the inferior mesenteric artery.    The SMA and celiac axis have been bypassed. There is an occluded right renal artery stent with right renal atrophy.    Moderately constipated colon. High density material in the esophagus is likely ingested as it is present prior to IV contrast administration.    Cardiomegaly. Trace pericardial effusion.    Nonobstructing left renal calculus.    Gil balloon is inflated in the prostate and should be repositioned. Fiducial markers.    Bladder wall thickening secondary to under distention or cystitis.    Findings were discussed with Dr. YAZ CUEVAS [phone] 2020 2:50 PM by Dr. Shah with read back confirmation.      ***Please see the addendum at the top of this report. It may contain additional important information or changes.****          DARINEL SHAH M.D., ATTENDING RADIOLOGIST  This document has been electronically signed. Sep 18 2020  3:14PM  Addend:  DARINEL SHAH M.D., ATTENDING RADIOLOGIST  This addendum was electronically signed on: Sep 18 2020  3:45PM.    < end of copied text >  
Patient is a 80y old  Male who presents with a chief complaint of hemoptysis (18 Sep 2020 19:10)    Patient seen and examined at bedside. No acute overnight events. Denies episodes of hemoptysis over night, denies fever, chills, nausea vomiting.     ALLERGIES:  No Known Allergies    MEDICATIONS  (STANDING):  aMIOdarone    Tablet 200 milliGRAM(s) Oral daily  amLODIPine   Tablet 10 milliGRAM(s) Oral daily  atorvastatin 40 milliGRAM(s) Oral at bedtime  buDESOnide    Inhalation Suspension 0.25 milliGRAM(s) Inhalation two times a day  DULoxetine 30 milliGRAM(s) Oral daily  furosemide    Tablet 40 milliGRAM(s) Oral daily  gabapentin 100 milliGRAM(s) Oral at bedtime  influenza   Vaccine 0.5 milliLiter(s) IntraMuscular once  ipratropium 0.02% for Nebulization - Peds 500 MICROGram(s) Inhalation four times a day  levalbuterol for Nebulization - Peds 0.63 milliGRAM(s) Nebulizer four times a day  levothyroxine 100 MICROGram(s) Oral daily  metoprolol tartrate 25 milliGRAM(s) Oral two times a day  pantoprazole    Tablet 40 milliGRAM(s) Oral before breakfast  potassium chloride    Tablet ER 40 milliEquivalent(s) Oral every 4 hours  potassium chloride    Tablet ER 10 milliEquivalent(s) Oral daily  potassium chloride  10 mEq/100 mL IVPB 10 milliEquivalent(s) IV Intermittent every 1 hour  senna 8.6 milliGRAM(s) Oral Tablet - Peds 2 Tablet(s) Oral at bedtime  tamsulosin 0.4 milliGRAM(s) Oral at bedtime    MEDICATIONS  (PRN):  acetaminophen   Tablet .. 650 milliGRAM(s) Oral every 8 hours PRN Mild Pain (1 - 3), Moderate Pain (4 - 6), Severe Pain (7 - 10)  magnesium hydroxide Suspension 30 milliLiter(s) Oral at bedtime PRN Constipation  simethicone 80 milliGRAM(s) Chew daily PRN Gas    Vital Signs Last 24 Hrs  T(F): 100.4 (19 Sep 2020 10:15), Max: 100.4 (19 Sep 2020 10:15)  HR: 72 (19 Sep 2020 10:15) (62 - 103)  BP: 129/62 (19 Sep 2020 10:15) (108/66 - 137/66)  RR: 18 (19 Sep 2020 10:15) (17 - 18)  SpO2: 95% (19 Sep 2020 10:15) (91% - 98%)  I&O's Summary    18 Sep 2020 07:01  -  19 Sep 2020 07:00  --------------------------------------------------------  IN: 0 mL / OUT: 900 mL / NET: -900 mL    BMI (kg/m2): 18.7 (20 @ 21:08)    PHYSICAL EXAM:  General: Frail elderly M sitting in bed, appears comfortable. NAD, A/O x 3. Diffuse ecchymosis on bilateral upper extremities   ENT: MMM, no tonsilar exudate  Neck: Supple, No JVD  Lungs: Fair air entry bilaterally, no rhonchi  Cardio: RRR, S1/S2, No murmurs  Abdomen: Soft, Nontender, Nondistended; Bowel sounds present  Extremities: No calf tenderness, No pitting edema    LABS:                        8.9    9.61  )-----------( 281      ( 19 Sep 2020 08:10 )             27.0           134  |  94  |  11  ----------------------------<  78  2.9   |  34  |  1.24    Ca    9.0      19 Sep 2020 08:10    TPro  6.7  /  Alb  2.0  /  TBili  0.4  /  DBili  x   /  AST  24  /  ALT  27  /  AlkPhos  101  -18     eGFR if Non African American: 56 mL/min/1.73M2 (20 @ 08:10)  eGFR if : 64 mL/min/1.73M2 (20 @ 08:10)    PT/INR - ( 18 Sep 2020 11:34 )   PT: 13.2 sec;   INR: 1.10 ratio      CARDIAC MARKERS ( 18 Sep 2020 11:34 )  <.017 ng/mL / x     / x     / x     / x        TSH 3.87   TSH with FT4 reflex --  Total T3 --    Urinalysis Basic - ( 18 Sep 2020 12:10 )    Color: Yellow / Appearance: Slightly Turbid / S.010 / pH: x  Gluc: x / Ketone: Negative  / Bili: Negative / Urobili: 1   Blood: x / Protein: 30 mg/dL / Nitrite: Negative   Leuk Esterase: Moderate / RBC: 5-10 /HPF / WBC 11-25 /HPF   Sq Epi: x / Non Sq Epi: Neg.-Few / Bacteria: Few /HPF    Culture - Urine (collected 18 Sep 2020 12:10)  Source: .Urine Clean Catch (Midstream)  Final Report (19 Sep 2020 11:29):    <10,000 CFU/mL Normal Urogenital Tari    RADIOLOGY & ADDITIONAL TESTS: EKG, CXR    Care Discussed with Consultants/Other Providers: Yes  
Patient is a 80y old  Male who presents with a chief complaint of hemoptysis (19 Sep 2020 18:37)    Patient seen and examined at bedside. No acute overnight events. Denies fever, chills, chest pain, shortness of breath.     ALLERGIES:  No Known Allergies    MEDICATIONS  (STANDING):  aMIOdarone    Tablet 200 milliGRAM(s) Oral daily  amLODIPine   Tablet 10 milliGRAM(s) Oral daily  atorvastatin 40 milliGRAM(s) Oral at bedtime  budesonide  80 MICROgram(s)/formoterol 4.5 MICROgram(s) Inhaler 2 Puff(s) Inhalation two times a day  DULoxetine 30 milliGRAM(s) Oral daily  furosemide    Tablet 40 milliGRAM(s) Oral daily  gabapentin 100 milliGRAM(s) Oral at bedtime  influenza   Vaccine 0.5 milliLiter(s) IntraMuscular once  ipratropium 17 MICROgram(s) HFA Inhaler 1 Puff(s) Inhalation every 6 hours  levalbuterol for Nebulization - Peds 0.63 milliGRAM(s) Nebulizer four times a day  levothyroxine 100 MICROGram(s) Oral daily  metoprolol tartrate 25 milliGRAM(s) Oral two times a day  pantoprazole    Tablet 40 milliGRAM(s) Oral before breakfast  potassium chloride    Tablet ER 10 milliEquivalent(s) Oral daily  senna 8.6 milliGRAM(s) Oral Tablet - Peds 2 Tablet(s) Oral at bedtime  tamsulosin 0.4 milliGRAM(s) Oral at bedtime    MEDICATIONS  (PRN):  acetaminophen   Tablet .. 650 milliGRAM(s) Oral every 8 hours PRN Mild Pain (1 - 3), Moderate Pain (4 - 6), Severe Pain (7 - 10)  magnesium hydroxide Suspension 30 milliLiter(s) Oral at bedtime PRN Constipation  simethicone 80 milliGRAM(s) Chew daily PRN Gas    Vital Signs Last 24 Hrs  T(F): 99 (20 Sep 2020 05:00), Max: 99 (20 Sep 2020 05:00)  HR: 81 (20 Sep 2020 09:55) (73 - 86)  BP: 133/69 (20 Sep 2020 05:00) (103/50 - 133/69)  RR: 16 (20 Sep 2020 05:00) (16 - 20)  SpO2: 95% (20 Sep 2020 09:55) (91% - 97%)  I&O's Summary    19 Sep 2020 07:01  -  20 Sep 2020 07:00  --------------------------------------------------------  IN: 200 mL / OUT: 800 mL / NET: -600 mL    BMI (kg/m2): 18.7 (20 @ 21:08)    PHYSICAL EXAM:  General: Thin, frail elderly M lying in bed, NAD, A/O x 3. Diffuse ecchymosis over bilateral upper extremities   ENT: MMM, no tonsilar exudate  Neck: Supple, No JVD  Lungs: Clear to auscultation bilaterally, no wheezes. Fair air entry bilaterally, L>R  Cardio: RRR, S1/S2, No murmurs  Abdomen: Soft, Nontender, Nondistended; Bowel sounds present  Extremities: No calf tenderness, No pitting edema    LABS:                        8.1    10.77 )-----------( 247      ( 20 Sep 2020 06:00 )             25.5           131  |  96  |  12  ----------------------------<  84  4.1   |  30  |  1.26    Ca    9.0      20 Sep 2020 06:00    TPro  6.7  /  Alb  2.0  /  TBili  0.4  /  DBili  x   /  AST  24  /  ALT  27  /  AlkPhos  101  18     eGFR if Non African American: 54 mL/min/1.73M2 (20 @ 06:00)  eGFR if : 63 mL/min/1.73M2 (20 @ 06:00)    PT/INR - ( 18 Sep 2020 11:34 )   PT: 13.2 sec;   INR: 1.10 ratio       CARDIAC MARKERS ( 18 Sep 2020 11:34 )  <.017 ng/mL / x     / x     / x     / x        TSH 3.87   TSH with FT4 reflex --  Total T3 --    Urinalysis Basic - ( 18 Sep 2020 12:10 )    Color: Yellow / Appearance: Slightly Turbid / S.010 / pH: x  Gluc: x / Ketone: Negative  / Bili: Negative / Urobili: 1   Blood: x / Protein: 30 mg/dL / Nitrite: Negative   Leuk Esterase: Moderate / RBC: 5-10 /HPF / WBC 11-25 /HPF   Sq Epi: x / Non Sq Epi: Neg.-Few / Bacteria: Few /HPF    Culture - Urine (collected 18 Sep 2020 12:10)  Source: .Urine Clean Catch (Midstream)  Final Report (19 Sep 2020 11:29):    <10,000 CFU/mL Normal Urogenital Tari    RADIOLOGY & ADDITIONAL TESTS: EKG, CXR    Care Discussed with Consultants/Other Providers: Yes  
Patient is a 80y old  Male who presents with a chief complaint of hemoptysis (20 Sep 2020 11:13)      INTERVAL HPI/OVERNIGHT EVENTS: Patient seen and examined at bedside. No overnight events.  No further episodes on hemoptysis. On supplemental O2.     MEDICATIONS  (STANDING):  aMIOdarone    Tablet 200 milliGRAM(s) Oral daily  amLODIPine   Tablet 10 milliGRAM(s) Oral daily  aspirin  chewable 81 milliGRAM(s) Oral daily  atorvastatin 40 milliGRAM(s) Oral at bedtime  budesonide  80 MICROgram(s)/formoterol 4.5 MICROgram(s) Inhaler 2 Puff(s) Inhalation two times a day  DULoxetine 30 milliGRAM(s) Oral daily  furosemide    Tablet 40 milliGRAM(s) Oral daily  gabapentin 100 milliGRAM(s) Oral at bedtime  influenza   Vaccine 0.5 milliLiter(s) IntraMuscular once  ipratropium 17 MICROgram(s) HFA Inhaler 1 Puff(s) Inhalation every 6 hours  levalbuterol for Nebulization - Peds 0.63 milliGRAM(s) Nebulizer four times a day  levothyroxine 100 MICROGram(s) Oral daily  metoprolol tartrate 25 milliGRAM(s) Oral two times a day  pantoprazole    Tablet 40 milliGRAM(s) Oral before breakfast  potassium chloride    Tablet ER 10 milliEquivalent(s) Oral daily  senna 8.6 milliGRAM(s) Oral Tablet - Peds 2 Tablet(s) Oral at bedtime  tamsulosin 0.4 milliGRAM(s) Oral at bedtime    MEDICATIONS  (PRN):  acetaminophen   Tablet .. 650 milliGRAM(s) Oral every 8 hours PRN Mild Pain (1 - 3), Moderate Pain (4 - 6), Severe Pain (7 - 10)  magnesium hydroxide Suspension 30 milliLiter(s) Oral at bedtime PRN Constipation  simethicone 80 milliGRAM(s) Chew daily PRN Gas      Allergies    No Known Allergies    Intolerances        REVIEW OF SYSTEMS:  CONSTITUTIONAL: No fever or chills  HEENT:  No headache, no sore throat  RESPIRATORY: No cough, wheezing, or shortness of breath  CARDIOVASCULAR: No chest pain, palpitations  GASTROINTESTINAL: No abd pain, nausea, vomiting, or diarrhea    Vital Signs Last 24 Hrs  T(C): 36.6 (21 Sep 2020 09:24), Max: 36.9 (20 Sep 2020 20:49)  T(F): 97.9 (21 Sep 2020 09:24), Max: 98.4 (20 Sep 2020 20:49)  HR: 65 (21 Sep 2020 09:24) (65 - 83)  BP: 117/68 (21 Sep 2020 09:24) (114/64 - 135/69)  BP(mean): --  RR: 16 (21 Sep 2020 09:24) (16 - 17)  SpO2: 97% (21 Sep 2020 09:24) (92% - 97%)    PHYSICAL EXAM:  GENERAL: NAD, on 2L NC, frail thin  HEENT:  anicteric, moist mucous membranes  CHEST/LUNG: poor inspiratory effort, decreased breath sounds at bases  HEART:  RRR, S1, S2  ABDOMEN:  BS+, soft, nontender, nondistended  EXTREMITIES: no edema, cyanosis, or calf tenderness, diffuse upper extremity ecchymosis   NERVOUS SYSTEM: answers questions and follows commands appropriately    LABS:                        9.1    9.50  )-----------( 253      ( 21 Sep 2020 07:00 )             27.7     09-21    132  |  96  |  13  ----------------------------<  86  3.9   |  28  |  1.28    Ca    8.9      21 Sep 2020 07:00          eGFR if Non African American: 53 mL/min/1.73M2 (09-21-20 @ 07:00)  eGFR if : 62 mL/min/1.73M2 (09-21-20 @ 07:00)                TSH 3.87   TSH with FT4 reflex --  Total T3 --                      Culture - Urine (collected 18 Sep 2020 12:10)  Source: .Urine Clean Catch (Midstream)  Final Report (19 Sep 2020 11:29):    <10,000 CFU/mL Normal Urogenital Tari          Culture - Urine (collected 09-18-20 @ 12:10)  Source: .Urine Clean Catch (Midstream)  Final Report (09-19-20 @ 11:29):    <10,000 CFU/mL Normal Urogenital Tari        RADIOLOGY & ADDITIONAL TESTS: Personally reviewed.     Consultant(s) Notes Reviewed:  [x] YES  [ ] NO    
Patient is a 80y old  Male who presents with a chief complaint of hemoptysis (21 Sep 2020 12:30)      INTERVAL HPI/OVERNIGHT EVENTS: Patient seen and examined at bedside. No overnight events.  No further episodes of hemoptysis. Has BM 2 days ago.      MEDICATIONS  (STANDING):  ALBUTerol    90 MICROgram(s) HFA Inhaler 2 Puff(s) Inhalation every 6 hours  aMIOdarone    Tablet 200 milliGRAM(s) Oral daily  amLODIPine   Tablet 10 milliGRAM(s) Oral daily  aspirin  chewable 81 milliGRAM(s) Oral daily  atorvastatin 40 milliGRAM(s) Oral at bedtime  budesonide  80 MICROgram(s)/formoterol 4.5 MICROgram(s) Inhaler 2 Puff(s) Inhalation two times a day  DULoxetine 30 milliGRAM(s) Oral daily  furosemide    Tablet 40 milliGRAM(s) Oral daily  gabapentin 100 milliGRAM(s) Oral at bedtime  influenza   Vaccine 0.5 milliLiter(s) IntraMuscular once  ipratropium 17 MICROgram(s) HFA Inhaler 2 Puff(s) Inhalation every 6 hours  levothyroxine 100 MICROGram(s) Oral daily  metoprolol tartrate 25 milliGRAM(s) Oral two times a day  pantoprazole    Tablet 40 milliGRAM(s) Oral before breakfast  potassium chloride    Tablet ER 10 milliEquivalent(s) Oral daily  senna 8.6 milliGRAM(s) Oral Tablet - Peds 2 Tablet(s) Oral at bedtime  tamsulosin 0.4 milliGRAM(s) Oral at bedtime    MEDICATIONS  (PRN):  acetaminophen   Tablet .. 650 milliGRAM(s) Oral every 8 hours PRN Mild Pain (1 - 3), Moderate Pain (4 - 6), Severe Pain (7 - 10)  magnesium hydroxide Suspension 30 milliLiter(s) Oral at bedtime PRN Constipation  simethicone 80 milliGRAM(s) Chew daily PRN Gas      Allergies    No Known Allergies    Intolerances        REVIEW OF SYSTEMS:  CONSTITUTIONAL: No fever or chills  HEENT:  No headache, no sore throat  RESPIRATORY: No cough, wheezing, or shortness of breath  CARDIOVASCULAR: No chest pain, palpitations  GASTROINTESTINAL: No abd pain, nausea, vomiting, or diarrhea  GENITOURINARY: No dysuria, frequency, or hematuria    Vital Signs Last 24 Hrs  T(C): 37.1 (22 Sep 2020 06:05), Max: 37.1 (22 Sep 2020 02:36)  T(F): 98.7 (22 Sep 2020 06:05), Max: 98.7 (22 Sep 2020 02:36)  HR: 80 (22 Sep 2020 08:16) (75 - 82)  BP: 133/70 (22 Sep 2020 06:05) (122/65 - 133/70)  BP(mean): --  RR: 16 (22 Sep 2020 06:05) (16 - 16)  SpO2: 97% (22 Sep 2020 08:16) (94% - 97%)    PHYSICAL EXAM:  GENERAL: NAD, on 2L NC, frail thin male  HEENT:  anicteric, dry mucous membranes, EOMI  CHEST/LUNG: diminished breath sounds at bases, no rales, wheezes, or rhonchi  HEART:  RRR, S1, S2  ABDOMEN:  BS+, soft, nontender, nondistended  : +Gil in place draining appropriate colored urine  EXTREMITIES: no edema, cyanosis, or calf tenderness, thin extremities with muscle wasting, scattered upper extremity ecchymosis  NERVOUS SYSTEM: answers questions and follows commands appropriately, A&Ox3, moves all extremities     LABS:                        8.8    9.05  )-----------( 267      ( 22 Sep 2020 06:08 )             27.1     09-22    133  |  97  |  14  ----------------------------<  96  3.9   |  32  |  1.42    Ca    8.8      22 Sep 2020 06:08          eGFR if Non African American: 47 mL/min/1.73M2 (09-22-20 @ 06:08)  eGFR if : 54 mL/min/1.73M2 (09-22-20 @ 06:08)                                    Culture - Body Fluid with Gram Stain (collected 21 Sep 2020 13:06)  Source: .Body Fluid Pleural Fluid  Gram Stain (21 Sep 2020 21:19):    No polymorphonuclear leukocytes seen    No organisms seen    by cytocentrifuge    Culture - Urine (collected 18 Sep 2020 12:10)  Source: .Urine Clean Catch (Midstream)  Final Report (19 Sep 2020 11:29):    <10,000 CFU/mL Normal Urogenital Tari          Culture - Body Fluid with Gram Stain (collected 09-21-20 @ 13:06)  Source: .Body Fluid Pleural Fluid  Gram Stain (09-21-20 @ 21:19):    No polymorphonuclear leukocytes seen    No organisms seen    by cytocentrifuge    Culture - Urine (collected 09-18-20 @ 12:10)  Source: .Urine Clean Catch (Midstream)  Final Report (09-19-20 @ 11:29):    <10,000 CFU/mL Normal Urogenital Tari        RADIOLOGY & ADDITIONAL TESTS: Personally reviewed.     Consultant(s) Notes Reviewed:  [x] YES  [ ] NO    
Patient is a 80y old  Male who presents with a chief complaint of hemoptysis (22 Sep 2020 12:52)      INTERVAL HPI/OVERNIGHT EVENTS: Patient seen and examined at bedside. No overnight events. Off supplemental O2. Tolerating diet.    MEDICATIONS  (STANDING):  ALBUTerol    90 MICROgram(s) HFA Inhaler 2 Puff(s) Inhalation every 6 hours  aMIOdarone    Tablet 200 milliGRAM(s) Oral daily  amLODIPine   Tablet 10 milliGRAM(s) Oral daily  aspirin  chewable 81 milliGRAM(s) Oral daily  atorvastatin 40 milliGRAM(s) Oral at bedtime  budesonide  80 MICROgram(s)/formoterol 4.5 MICROgram(s) Inhaler 2 Puff(s) Inhalation two times a day  DULoxetine 30 milliGRAM(s) Oral daily  furosemide    Tablet 40 milliGRAM(s) Oral daily  gabapentin 100 milliGRAM(s) Oral at bedtime  influenza   Vaccine 0.5 milliLiter(s) IntraMuscular once  ipratropium 17 MICROgram(s) HFA Inhaler 2 Puff(s) Inhalation every 6 hours  levothyroxine 100 MICROGram(s) Oral daily  magnesium hydroxide Suspension 30 milliLiter(s) Oral at bedtime  metoprolol tartrate 25 milliGRAM(s) Oral two times a day  pantoprazole    Tablet 40 milliGRAM(s) Oral before breakfast  potassium chloride    Tablet ER 10 milliEquivalent(s) Oral daily  senna 8.6 milliGRAM(s) Oral Tablet - Peds 2 Tablet(s) Oral at bedtime  tamsulosin 0.4 milliGRAM(s) Oral at bedtime    MEDICATIONS  (PRN):  acetaminophen   Tablet .. 650 milliGRAM(s) Oral every 8 hours PRN Mild Pain (1 - 3), Moderate Pain (4 - 6), Severe Pain (7 - 10)  simethicone 80 milliGRAM(s) Chew daily PRN Gas      Allergies    No Known Allergies    Intolerances        REVIEW OF SYSTEMS:  CONSTITUTIONAL: No fever or chills  HEENT:  No headache, no sore throat  RESPIRATORY: minimal cough, no wheezing, or shortness of breath  CARDIOVASCULAR: No chest pain, palpitations  GASTROINTESTINAL: No abd pain, nausea, vomiting, or diarrhea  GENITOURINARY: chronic durand     Vital Signs Last 24 Hrs  T(C): 36.9 (23 Sep 2020 08:39), Max: 36.9 (23 Sep 2020 05:05)  T(F): 98.4 (23 Sep 2020 08:39), Max: 98.5 (23 Sep 2020 05:05)  HR: 79 (23 Sep 2020 08:39) (69 - 92)  BP: 130/66 (23 Sep 2020 08:39) (99/44 - 137/72)  BP(mean): --  RR: 16 (23 Sep 2020 05:05) (16 - 23)  SpO2: 90% (23 Sep 2020 08:39) (90% - 97%)    PHYSICAL EXAM:  GENERAL: NAD, frail thin male  HEENT:  anicteric, moist mucous membranes, EOMI  CHEST/LUNG: diminished breath sounds at bases, no rales, wheezes, or rhonchi, thin frame, ribs protruding   HEART:  RRR, S1, S2  ABDOMEN:  BS+, soft, nontender, nondistended  : +Durand in place draining appropriate colored urine  EXTREMITIES: no edema, cyanosis, or calf tenderness, thin extremities with muscle wasting, scattered upper extremity ecchymosis  NERVOUS SYSTEM: answers questions and follows commands appropriately, moves all extremities       LABS:                        8.8    9.05  )-----------( 267      ( 22 Sep 2020 06:08 )             27.1     09-23    133  |  98  |  14  ----------------------------<  87  3.6   |  28  |  1.18    Ca    8.8      23 Sep 2020 07:05          eGFR if Non African American: 59 mL/min/1.73M2 (09-23-20 @ 07:05)  eGFR if African American: 68 mL/min/1.73M2 (09-23-20 @ 07:05)                                    Culture - Fungal, Body Fluid (collected 21 Sep 2020 13:06)  Source: .Body Fluid Pleural Fluid  Preliminary Report (22 Sep 2020 10:08):    Testing in progress    Culture - Body Fluid with Gram Stain (collected 21 Sep 2020 13:06)  Source: .Body Fluid Pleural Fluid  Gram Stain (21 Sep 2020 21:19):    No polymorphonuclear leukocytes seen    No organisms seen    by cytocentrifuge  Preliminary Report (22 Sep 2020 16:51):    No growth    Culture - Urine (collected 18 Sep 2020 12:10)  Source: .Urine Clean Catch (Midstream)  Final Report (19 Sep 2020 11:29):    <10,000 CFU/mL Normal Urogenital Tari          Culture - Fungal, Body Fluid (collected 09-21-20 @ 13:06)  Source: .Body Fluid Pleural Fluid  Preliminary Report (09-22-20 @ 10:08):    Testing in progress    Culture - Body Fluid with Gram Stain (collected 09-21-20 @ 13:06)  Source: .Body Fluid Pleural Fluid  Gram Stain (09-21-20 @ 21:19):    No polymorphonuclear leukocytes seen    No organisms seen    by cytocentrifuge  Preliminary Report (09-22-20 @ 16:51):    No growth    Culture - Urine (collected 09-18-20 @ 12:10)  Source: .Urine Clean Catch (Midstream)  Final Report (09-19-20 @ 11:29):    <10,000 CFU/mL Normal Urogenital Tari        RADIOLOGY & ADDITIONAL TESTS: Personally reviewed.     Consultant(s) Notes Reviewed:  [x] YES  [ ] NO    
Follow-up Pulmonary Progress Note  Chief Complaint : Hemoptysis    No more episodes of hemoptysis reported. Hgb is stable. Not in distress.       Allergies :No Known Allergies      PAST MEDICAL & SURGICAL HISTORY:  Aortic aneurysm    Heart disease  sp cardiac cath with stent placement    BPH (benign prostatic hyperplasia)    Hypertension    S/P coronary artery stent placement  1 stent- blocked artery        Medications:  MEDICATIONS  (STANDING):  aMIOdarone    Tablet 200 milliGRAM(s) Oral daily  amLODIPine   Tablet 10 milliGRAM(s) Oral daily  aspirin  chewable 81 milliGRAM(s) Oral daily  atorvastatin 40 milliGRAM(s) Oral at bedtime  budesonide  80 MICROgram(s)/formoterol 4.5 MICROgram(s) Inhaler 2 Puff(s) Inhalation two times a day  DULoxetine 30 milliGRAM(s) Oral daily  furosemide    Tablet 40 milliGRAM(s) Oral daily  gabapentin 100 milliGRAM(s) Oral at bedtime  influenza   Vaccine 0.5 milliLiter(s) IntraMuscular once  ipratropium 17 MICROgram(s) HFA Inhaler 1 Puff(s) Inhalation every 6 hours  levalbuterol for Nebulization - Peds 0.63 milliGRAM(s) Nebulizer four times a day  levothyroxine 100 MICROGram(s) Oral daily  metoprolol tartrate 25 milliGRAM(s) Oral two times a day  pantoprazole    Tablet 40 milliGRAM(s) Oral before breakfast  potassium chloride    Tablet ER 10 milliEquivalent(s) Oral daily  senna 8.6 milliGRAM(s) Oral Tablet - Peds 2 Tablet(s) Oral at bedtime  tamsulosin 0.4 milliGRAM(s) Oral at bedtime    MEDICATIONS  (PRN):  acetaminophen   Tablet .. 650 milliGRAM(s) Oral every 8 hours PRN Mild Pain (1 - 3), Moderate Pain (4 - 6), Severe Pain (7 - 10)  magnesium hydroxide Suspension 30 milliLiter(s) Oral at bedtime PRN Constipation  simethicone 80 milliGRAM(s) Chew daily PRN Gas      LABS:                        8.1    10.77 )-----------( 247      ( 20 Sep 2020 06:00 )             25.5     09-20    131<L>  |  96  |  12  ----------------------------<  84  4.1   |  30  |  1.26    Ca    9.0      20 Sep 2020 06:00    TPro  6.7  /  Alb  2.0<L>  /  TBili  0.4  /  DBili  x   /  AST  24  /  ALT  27  /  AlkPhos  101  09-18      CARDIAC MARKERS ( 18 Sep 2020 11:34 )  <.017 ng/mL / x     / x     / x     / x            PT/INR - ( 18 Sep 2020 11:34 )   PT: 13.2 sec;   INR: 1.10 ratio           Urinalysis Basic - ( 18 Sep 2020 12:10 )    Color: Yellow / Appearance: Slightly Turbid / S.010 / pH: x  Gluc: x / Ketone: Negative  / Bili: Negative / Urobili: 1   Blood: x / Protein: 30 mg/dL / Nitrite: Negative   Leuk Esterase: Moderate / RBC: 5-10 /HPF / WBC 11-25 /HPF   Sq Epi: x / Non Sq Epi: Neg.-Few / Bacteria: Few /HPF              CULTURES: (if applicable)    Culture - Urine (collected 20 @ 12:10)  Source: .Urine Clean Catch (Midstream)  Final Report (20 @ 11:29):    <10,000 CFU/mL Normal Urogenital Tari            CAPILLARY BLOOD GLUCOSE          RADIOLOGY  CXR:      CT:    ECHO:      VITALS:  T(C): 36.6 (20 @ 10:58), Max: 37.2 (20 @ 05:00)  T(F): 97.8 (20 @ 10:58), Max: 99 (20 @ 05:00)  HR: 61 (20 @ 10:58) (61 - 86)  BP: 103/64 (20 @ 10:58) (103/50 - 133/69)  BP(mean): --  ABP: --  ABP(mean): --  RR: 16 (20 @ 10:58) (16 - 20)  SpO2: 90% (20 @ 10:58) (90% - 97%)  CVP(mm Hg): --  CVP(cm H2O): --    Ins and Outs     20 @ 07:01  -  20 @ 07:00  --------------------------------------------------------  IN: 200 mL / OUT: 800 mL / NET: -600 mL        Height (cm): 182.9 (20 @ 21:08)  Weight (kg): 62.5 (20 @ 21:08)  BMI (kg/m2): 18.7 (20 @ 21:08)        I&O's Detail    19 Sep 2020 07:01  -  20 Sep 2020 07:00  --------------------------------------------------------  IN:    Oral Fluid: 200 mL  Total IN: 200 mL    OUT:    Indwelling Catheter - Urethral (mL): 800 mL  Total OUT: 800 mL    Total NET: -600 mL

## 2020-09-23 NOTE — PROGRESS NOTE ADULT - PROVIDER SPECIALTY LIST ADULT
Gastroenterology
Hospitalist
Pulmonology

## 2020-09-23 NOTE — DISCHARGE NOTE PROVIDER - CARE PROVIDER_API CALL
Wade Curran  [facility]  [address], [address]  [place] [address]  Phone: [phone]  Fax: [phone]  Follow Up Time:     Grayson Ni  CRITICAL CARE MEDICINE  [address]  [place] [address]  Phone: [phone]  Fax: [phone]  Follow Up Time:

## 2020-09-23 NOTE — PROGRESS NOTE ADULT - REASON FOR ADMISSION
hemoptysis

## 2020-09-23 NOTE — PROGRESS NOTE ADULT - ASSESSMENT
81 y/o male, recent aortic aneurysm repair 1 month ago ([facility] Dr Hu), HTN, Emphysema, BPH, currently being tx for UTI/pneumonia  (on zosyn and Macrobid, last dose today of macrobid), hypothyroidism, comes from [place] rehab c/o hemoptysis admitted found to have b/l pleural effusions.    #Hemoptysis, Recent aortic aneurysm repair 08/07/20 ([facility])  -Per ED and radiology, Dr. Hu ([facility]) made aware of findings - no acute intervention needed  -Cont tele monitoring   -Monitor H/H. Stable. Transfuse for Hb <7  -stool occult positive, GI following, recent EGD done last month  -C/w ASA 81mg daily  -Per chart, hx of GIB last melanic stool 8/12, underwent EGD at [facility] on 8/13, negative for gastric ulcers, bleed.  Biopsy sent for h.pylori. advised to cont protonix 40mg qd    #Acute hypoxic respiratory failure  #Bilateral pleural effusion, Soft tissue focus mucous vs mass per imaging)  -Right pl effusion partially loculated on CT imaging  -S/p thoracentesis on 9/21. Transudative effusion.   -Pulm consulted, recommendations appreciated  -Continue supplemental O2, titrate off with appropriate SpO2. Patient does not use supplemental O2 at home  -Repeat CT chest in 1 month outpatient     #Hyponatremia  -Fluid restrict   -Repeat BMP in AM    #VENITA, hx of urinary retention  -Baseline Cr 1.05 on 9/12  -Cont Lasix for now   -Monitor BMP  -Monitor I/Os. 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 no evidence of hemodynamically significant renal artery stenosis on left     #HTN, CAD - well controlled  -cont home meds - metoprolol, amiodarone, amlodipine, Lasix, Imdur  -cont statin    #Emphysema, hx of tobacco use  - not on home O2, maintain O2 sat >90%, supplement with nasal canula prn  -cont home meds - Albuterol, Ipratropium, Symbicort   -incentive spirometry     #BPH  -Monitor I/Os  -cont flomax    #UTI/pneumonia (on zosyn and Macrobid)  -Completed course of macrobid   -repeat UA mod LE , few bact  -Afebrile, no leukocytosis. Monitor off abx    #hypothyroidism   -cont synthroid 100mcg daily  -TSH 3.87    #severe protein calorie malnutrition  -dietary consult appreciated  -2xprotein at breakfast. Patient dislikes supplemental shakes    DVT prophylaxis: SCDs    Code Status: Full Code  PT recommending return to [facility]    HCP: dtr Kaur Ramirez   Discussed case with patients daughter, aware and in agreement with above.

## 2020-09-23 NOTE — PROGRESS NOTE ADULT - NUTRITIONAL ASSESSMENT
This patient has been assessed with a concern for Malnutrition and has been determined to have a diagnosis/diagnoses of Severe protein-calorie malnutrition and Underweight/BMI < 19.    This patient is being managed with:   Diet Regular-  Entered: Sep 20 2020  2:37PM    

## 2020-09-23 NOTE — DISCHARGE NOTE PROVIDER - HOSPITAL COURSE
Hospital Course  79 y/o male, recent aortic aneurysm repair 1 month ago ([facility]), HTN, Emphysema, BPH, currently being tx for UTI/pneumonia  (on zosyn and Macrobid, last dose today of macrobid), hypothyroidism, comes from [place] rehab c/o hemoptysis admitted found to have b/l pleural effusions. Acute hypoxic respiratory failure due to bilateral pleural effusion. H/H stabilized, no further episodes of hemoptysis. Pulm, consulted, thoracentesis performed. Titrated off supplemental O2 with appropriate SpO2.     GI consulted for stool occult positive. Per chart, hx of GIB last melanic stool 8/12, underwent EGD at [facility] on 8/13, negative for gastric ulcers, bleed.  Biopsy sent for h.pylori. advised to cont protonix 40mg qd  Patient also in VENITA, hx of urinary retention. Gil placed prior to arrival. 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 no evidence of hemodynamically significant renal artery stenosis on left     Patient with severe protein calorie malnutrition advised supplemental shakes, but patient does not want. Encouraged increased protein intake.     On day of discharge, patient stable for return to [facility]. Plan for repeat CT chest in 1 month outpatient     Discharging Provider:  Ron Diaz DO  Contact Info: Cell [phone]  Please call with any questions or concerns.    Outpatient Provider: Dr. Curran notified     Time spent: 35 minutes.

## 2020-10-24 NOTE — ED PROVIDER NOTE - CARE PLAN
Principal Discharge DX:	Hypoxemia  Secondary Diagnosis:	Large pleural effusion  Secondary Diagnosis:	Acute pneumonia

## 2020-10-24 NOTE — H&P ADULT - NSICDXPASTMEDICALHX_GEN_ALL_CORE_FT
PAST MEDICAL HISTORY:  Anemia     Anxiety     Aortic aneurysm     BPH (benign prostatic hyperplasia)     Cardiac arrhythmia     COPD (chronic obstructive pulmonary disease)     Heart disease sp cardiac cath with stent placement    Hypertension

## 2020-10-24 NOTE — ED ADULT NURSE NOTE - CHIEF COMPLAINT QUOTE
Pt BIBA from the [facility] for Rehab for respiratory distress with low O2 saturation

## 2020-10-24 NOTE — ED ADULT NURSE NOTE - PMH
Anemia    Anxiety    Aortic aneurysm    BPH (benign prostatic hyperplasia)    Cardiac arrhythmia    COPD (chronic obstructive pulmonary disease)    Heart disease  sp cardiac cath with stent placement  Hypertension

## 2020-10-24 NOTE — H&P ADULT - NSHPPOADEEPVENOUSTHROMB_GEN_A_CORE
no Detail Level: Detailed X Size Of Defect In Cm (Optional): 0 Referring Provider (Optional): Iram Chapman MD Size Of Defect: 1.3 X Size Of Defect In Cm (Optional): 1.5

## 2020-10-24 NOTE — H&P ADULT - ASSESSMENT
Assessment:  Mr. 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    Problem List:  1) Acute hypoxic respiratory failure 2/2 to right mainstem bronchus plugging  2) COPD  3) Anemia   4) pmhx: hypothyroid, HTN, BPH    Plan:  #Acute hypoxic respiratory failure  - Patient will be put on HFNC 100 FiO2, 60L/min from NRB   - CT can shows atelectasis of entire right lung 2/2 plugging of the right mainstem. Patient will require aggressive pulmonary PT to help break up plug.   - Repeat ABG AM to evaluate hypoxia   - Mucomyst 10% 4mL TID   - Pulmonary toilet as needed    #COPD  - Duonebs q6h   - Symbicort 160mg 2 puffs q12h   - Solu-medrol 20mg IV push q8h   - Azithromycin 500mg for 5 days     #Anemia   - Patient Hgb 7.4, no clinical signs of anemia. Patient currently does not exhibit any signs of ACS, therefore, blood transfusion currently not recommended   - Patient receives retacrit. Consult with nephro to provide appropriate dose for patient   - Anemia seems to be from a more chronic source rather than current bleeding/hemorrhage     #hypothyroid   - Patient received synthroid 100mcg, c/w current dosage     #HTN  - Patient currently normotensive, does not require medical treatment     #BPH  - Patient on flomax 0.4mg twice daily. Will continue to prevent retention     #Dvt prophylaxis   - Lovenox 40mg subq once daily     #GI prophylaxis   - Protonix 40mg IVP once daily Assessment:  Mr. 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    Problem List:  1) Acute hypoxic respiratory failure 2/2 to right mainstem bronchus plugging  2) COPD  3) Anemia   4) pmhx: hypothyroid, HTN, BPH    Plan:  #Acute hypoxic respiratory failure  - Patient will be put on HFNC 100 FiO2, 60L/min from NRB   - CT can shows atelectasis of entire right lung 2/2 plugging of the right mainstem. Patient will require aggressive pulmonary PT to help break up plug.   - Repeat ABG AM to evaluate hypoxia   - Mucomyst 10% 4mL TID   - Pulmonary toilet as needed  -repeat AM cxr to evaluate lung recruitment  -will discuss with ICU attending in Am, patient may benefit from bronchoscopy if lung recruitment not adequate with chest PT alone.     #COPD  - Duonebs q6h   - Symbicort 160mg 2 puffs q12h   - Solu-medrol 20mg IV push q8h   - Azithromycin 500mg for 5 days     #Anemia   - Patient Hgb 7.4, no clinical signs of anemia. Patient currently does not exhibit any signs of ACS, therefore, blood transfusion currently not recommended   - Patient receives retacrit. Consult with nephro to provide appropriate dose for patient   - Anemia seems to be from a more chronic source rather than current bleeding/hemorrhage     #hypothyroid   - Patient received synthroid 100mcg, c/w current dosage     #HTN  - Patient currently normotensive, does not require medical treatment     #BPH  - Patient on flomax 0.4mg twice daily. Will continue to prevent retention     #Dvt prophylaxis   - Lovenox 40mg subq once daily     #GI prophylaxis   - Protonix 40mg IVP once daily

## 2020-10-24 NOTE — H&P ADULT - NSHPLABSRESULTS_GEN_ALL_CORE
ICU Vital Signs Last 24 Hrs  T(C): 36.1 (25 Oct 2020 00:30), Max: 37.2 (24 Oct 2020 20:35)  T(F): 96.9 (25 Oct 2020 00:30), Max: 98.9 (24 Oct 2020 20:35)  HR: 77 (25 Oct 2020 00:30) (71 - 81)  BP: 111/65 (25 Oct 2020 00:30) (97/59 - 114/58)  BP(mean): 79 (25 Oct 2020 00:30) (67 - 79)  RR: 16 (25 Oct 2020 00:30) (16 - 23)  SpO2: 98% (25 Oct 2020 00:30) (84% - 100%)              7.4    12.12 )-----------( 327      ( 24 Oct 2020 20:28 )             24.3   10-24    131<L>  |  98  |  18  ----------------------------<  95  4.2   |  27  |  1.25    Ca    8.7      24 Oct 2020 20:28    TPro  5.8<L>  /  Alb  1.4<L>  /  TBili  0.4  /  DBili  x   /  AST  28  /  ALT  20  /  AlkPhos  140<H>  10-24    ABG - ( 24 Oct 2020 20:30 )  pH, Arterial: 7.41  pH, Blood: x     /  pCO2: 39    /  pO2: 56    / HCO3: x     / Base Excess: x     /  SaO2: 85        < from: CT Chest No Cont (10.24.20 @ 23:17) >      EXAM:  CT CHEST      PROCEDURE DATE:  10/24/2020        INTERPRETATION:  CLINICAL INFORMATION: Abnormal x-ray with interstitial infiltrates.    COMPARISON: CT of the chest from 9/18/2020.    PROCEDURE:  CT of the Chest was performed without intravenous contrast.  Sagittal and coronal reformats were performed.    FINDINGS:    LUNGS AND AIRWAYS: Debris nearly completely opacifying the right mainstem bronchus. Complete compressive atelectasis of the right upper, middle and lower lobes. Centrilobular emphysema most prominent in the left upper lobe. Interstitial opacities in the left upper lobe which could be due to atelectasis versus an infectious etiology. Compressive atelectasis of part of the left lower lobe.  PLEURA: Large right and moderate to large left pleural effusions have intervally increased in size.  MEDIASTINUM AND MICHELLE: No lymphadenopathy.  VESSELS: Redemonstration of an endovascular stent in the descending thoracic aorta extending into the abdominal aorta with stent grafts into the superior mesenteric and bilateral renal arteries bypassing a descending thoracic and abdominal aortic aneurysm.  HEART: Heart size is enlarged. Small pericardial effusion. Moderate coronary artery calcifications.  CHEST WALL AND LOWER NECK: Generalized anasarca.  VISUALIZED UPPER ABDOMEN: Cholelithiasis. Right renal atrophy. Right renal high attenuation lesions likely representing hemorrhagic cysts.  BONES: Degenerative changes of the spine. T12 compression fracture deformity, not significantly changed.    IMPRESSION:  1. Large right pleural effusion with compressive atelectasis of the entire right lung and moderate to large left pleural effusion with partial compressive atelectasis of the left lower lobe which has intervally worsened.  2. Interstitial opacities in the left upper lobe which could be due to atelectasis versus an infectious etiology.  3. Debris nearly completely opacifying the right mainstem bronchus.    SARA MARTINEZ MD; Attending Radiologist  This document has been electronically signed. Oct 25 2020 12:06AM    < end of copied text >

## 2020-10-24 NOTE — PROVIDER CONTACT NOTE (OTHER) - SITUATION
chart review performed by charge RN and receiving RN to prepare for ICU transfer. Room 5 set up and admissions notified. Awaiting report from ED.

## 2020-10-24 NOTE — ED PROVIDER NOTE - OBJECTIVE STATEMENT
sob hypoxic in field covid neg 10/20/20, u cx +e coli   10/20/20  pan sensitive on cefuroxime cc sob sop2 80 % in field in ed above 90 % with non rebreather

## 2020-10-24 NOTE — ED ADULT NURSE REASSESSMENT NOTE - NS ED NURSE REASSESS COMMENT FT1
Urethral catheter changed, no urine output, bladder scanned pt, 0 mL recorded in bladder. MD Hanna made aware, plan for admission. Pt on bedside monitor on supplemental oxygen, will continue to monitor.

## 2020-10-24 NOTE — ED ADULT NURSE REASSESSMENT NOTE - NS ED NURSE REASSESS COMMENT FT1
Plan to hold sepsis fluid bolus due to pt unknown CHF status as per MD Hanna. Blood cx, lactate sent as per sepsis protocol, plan for administration of IV abx.

## 2020-10-24 NOTE — ED ADULT NURSE NOTE - OBJECTIVE STATEMENT
Pt BIB EMS from [facility] c/o respiratory distress. As per EMS pt was found with increased work of breathing, O2 sat in the 70s on room air and responded to oxygen delivery via NRB, O2 sat increased to high 80s. Pt presents to ED awake alert oriented x3. O2 sat between 86-89% via NRB mask. MD Hanna inserted nasopharyngeal airway in ED, O2 sat increased and is now WNL. Pt is afebrile, denies cp, unknown covid contacts.

## 2020-10-24 NOTE — ED ADULT NURSE NOTE - NSIMPLEMENTINTERV_GEN_ALL_ED
Implemented All Fall Risk Interventions:  [place] to call system. Call bell, personal items and telephone within reach. Instruct patient to call for assistance. Room bathroom lighting operational. Non-slip footwear when patient is off stretcher. Physically safe environment: no spills, clutter or unnecessary equipment. Stretcher in lowest position, wheels locked, appropriate side rails in place. Provide visual cue, wrist band, yellow gown, etc. Monitor gait and stability. Monitor for mental status changes and reorient to person, place, and time. Review medications for side effects contributing to fall risk. Reinforce activity limits and safety measures with patient and family.

## 2020-10-24 NOTE — ED ADULT NURSE REASSESSMENT NOTE - NS ED NURSE REASSESS COMMENT FT1
Pt is being evaluated by EMMETT Chaudhary for possible ICU admission. Pt's unable to maintain a decent O2 Sat for an extended period of time. Respiratory paged for High Flow O2 set up.

## 2020-10-24 NOTE — H&P ADULT - HISTORY OF PRESENT ILLNESS
Mr. 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 Was called to examine patient in ED. When patient was examined, patient was on 100%NRB w/o2 saturation at 95%. Patient states that he feels much better since arriving and being placed on o2 therapy. Patient denies any n/v/d, chest pain, fatigue, or fevers.     ED ABG prior to O2 therapy: pH 7.41, pCO2 39, pO2 56, HCO3 26, FiO2 100%, O2 saturation 85%    CT scan was ordered which showed significant plugging of right mainstem bronchus Mr. 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 Was called to examine patient in ED. When patient was examined, patient was on 100%NRB w/o2 saturation at 95%. Patient states that he feels much better since arriving and being placed on o2 therapy. Patient denies any n/v/d, chest pain, fatigue, or fevers.     ED ABG prior to O2 therapy: pH 7.41, pCO2 39, pO2 56, HCO3 26, FiO2 100%, O2 saturation 85%    CT scan was ordered which showed significant mucous plugging of right mainstem bronchus, leading to near complete collapse/atelectasis of right lung

## 2020-10-25 PROBLEM — I71.9 AORTIC ANEURYSM OF UNSPECIFIED SITE, WITHOUT RUPTURE: Chronic | Status: ACTIVE | Noted: 2020-01-01

## 2020-10-25 NOTE — PROGRESS NOTE ADULT - SUBJECTIVE AND OBJECTIVE BOX
Follow-up Critical Care Progress Note  Chief Complaint : Hypoxemia        No new events overnight.  Denies SOB/CP.     Allergies :No Known Allergies      PAST MEDICAL & SURGICAL HISTORY:  Anemia    Cardiac arrhythmia    Anxiety    COPD (chronic obstructive pulmonary disease)    Aortic aneurysm    Heart disease  sp cardiac cath with stent placement    BPH (benign prostatic hyperplasia)    Hypertension    S/P coronary artery stent placement  1 stent- blocked artery        Medications:  MEDICATIONS  (STANDING):  acetylcysteine 10%  Inhalation 2 milliLiter(s) Inhalation every 4 hours  albuterol/ipratropium for Nebulization 3 milliLiter(s) Nebulizer every 4 hours  azithromycin  IVPB 500 milliGRAM(s) IV Intermittent every 24 hours  azithromycin  IVPB      budesonide 160 MICROgram(s)/formoterol 4.5 MICROgram(s) Inhaler 2 Puff(s) Inhalation two times a day  cefTRIAXone   IVPB      chlorhexidine 2% Cloths 1 Application(s) Topical <User Schedule>  enoxaparin Injectable 40 milliGRAM(s) SubCutaneous daily  furosemide   Injectable 40 milliGRAM(s) IV Push every 12 hours  influenza   Vaccine 0.5 milliLiter(s) IntraMuscular once  levothyroxine 100 MICROGram(s) Oral daily  methylPREDNISolone sodium succinate Injectable 20 milliGRAM(s) IV Push every 8 hours  pantoprazole  Injectable 40 milliGRAM(s) IV Push daily  tamsulosin 0.4 milliGRAM(s) Oral two times a day    MEDICATIONS  (PRN):  acetaminophen   Tablet .. 650 milliGRAM(s) Oral every 6 hours PRN Temp greater or equal to 38C (100.4F), Mild Pain (1 - 3)    LABS:              7.3    13.01 )-----------( 298      ( 25 Oct 2020 05:00 )             24.7     10-25    130<L>  |  98  |  18  ----------------------------<  122<H>  3.8   |  28  |  1.27    Ca    8.6      25 Oct 2020 05:00  Phos  3.7     10-25  Mg     1.8     10-25    TPro  5.8<L>  /  Alb  1.4<L>  /  TBili  0.4  /  DBili  x   /  AST  28  /  ALT  20  /  AlkPhos  140<H>  10-24    CARDIAC MARKERS ( 24 Oct 2020 20:28 )  <.017 ng/mL / x     / 18 U/L / x     / 1.6 ng/mL    PT/INR - ( 24 Oct 2020 20:28 )   PT: 14.0 sec;   INR: 1.17 ratio       PTT - ( 24 Oct 2020 20:28 )  PTT:39.9 sec  Urinalysis Basic - ( 24 Oct 2020 22:15 )    Color: Yellow / Appearance: Clear / S.010 / pH: x  Gluc: x / Ketone: Negative  / Bili: Negative / Urobili: Negative   Blood: x / Protein: 100 / Nitrite: Negative   Leuk Esterase: Moderate / RBC: 5-10 /HPF / WBC 6-10 /HPF   Sq Epi: x / Non Sq Epi: Neg.-Few / Bacteria: Few /HPF    Serum Pro-Brain Natriuretic Peptide: 3345 pg/mL (10-24-20 @ 20:28)    CULTURES: (if applicable)    ABG - ( 25 Oct 2020 05:01 )  pH, Arterial: 7.41  pH, Blood: x     /  pCO2: 37    /  pO2: 79    / HCO3: x     / Base Excess: x     /  SaO2: 94        VITALS:  T(C): 36.6 (10-25-20 @ 08:00), Max: 37.2 (10-24-20 @ 20:35)  T(F): 97.8 (10-25-20 @ 08:00), Max: 98.9 (10-24-20 @ 20:35)  HR: 73 (10-25-20 @ 08:28) (70 - 81)  BP: 121/76 (10-25-20 @ 08:00) (97/59 - 126/69)  BP(mean): 91 (10-25-20 @ 08:00) (67 - 91)  ABP: --  ABP(mean): --  RR: 14 (10-25-20 @ 08:00) (14 - 23)  SpO2: 94% (10-25-20 @ 08:28) (84% - 100%)  CVP(mm Hg): --  CVP(cm H2O): --    Ins and Outs     10-24-20 @ 07:01  -  10-25-20 @ 07:00  --------------------------------------------------------  IN: 420 mL / OUT: 300 mL / NET: 120 mL        Height (cm): 182.9 (10-25-20 @ 00:30)  Weight (kg): 62.6 (10-25-20 @ 00:30)  BMI (kg/m2): 18.7 (10-25-20 @ 00:30)        I&O's Detail    24 Oct 2020 07:01  -  25 Oct 2020 07:00  --------------------------------------------------------  IN:    IV PiggyBack: 50 mL    IV PiggyBack: 250 mL    Oral Fluid: 120 mL  Total IN: 420 mL    OUT:    Indwelling Catheter - Urethral (mL): 300 mL  Total OUT: 300 mL    Total NET: 120 mL          Physical Examination:  GENERAL:               Alert, Oriented, No acute distress.    HEENT:                    Pupils equal, reactive to light.  Symmetric. No JVD, Moist MM  PULM:                     Bilateral air entry, Clear to auscultation bilaterally, no significant sputum production, No Rales, No Rhonchi, No Wheezing  CVS:                         S1, S2,  No Murmur  ABD:                        Soft, nondistended, nontender, normoactive bowel sounds,   EXT:                         No edema, nontender, No Cyanosis or Clubbing   Vascular:                Warm Extremities, Normal Capillary refill, Normal Distal Pulses  SKIN:                       Warm and well perfused, no rashes noted.   NEURO:                  Alert, oriented, interactive, nonfocal, follows commands  PSYC:                      Calm, + Insight.

## 2020-10-25 NOTE — PROGRESS NOTE ADULT - ASSESSMENT
Assessment:  1. Acute respiratory failure  2. Bilateral pleural effusion  3. COPD  4. Right upper lobe collapse   5. Coronary artery disease   6. Hypertension   7. Hypothyroidism    Plan  - CT scan from september showing bilateral effusions with partial right lung collapse. Thoracentesis showing transudative effusion.   - POCUS showing bilateral pleural effusions  - Will start lasix BID and aim for negative fluid balance  - Check Echo  - IV steroids, empiric antibiotics, though no obvious consolidation on CT. Suspect underlying cardiac dysfunction.   - Oxygen support to maintain saturation > 90%   - Cont. thyroid medications and BPH medications  - Daughter Kaur Ramirez ([phone]) -updated 10/25, states patient has been in and out of the hospital due to "fluid in the lungs".   - Prognosis is guarded, given recurrent hospitalizations.

## 2020-10-25 NOTE — PATIENT PROFILE ADULT - NSASFALLNEEDSASSIST_GEN_A_NUR
Donna Edwards  MRN: [id number]   55 year old Female    : 1962   Author: Em Garibay RPH    Primary Assessment  Assessment date: 2017   Ready to Learn: Yes.  Person instructed: Patient.  Primary Language: English.  Method of Instruction: Written, Explanation  Previous assessment of Learning needs Noted: No new learning needs identified.    Patient Education  Date:  2017  Topic: Medications.  Session: Initial.  Evaluation: Able to verbalize.  Instructor: Em Garibay RPH    Resources  Medication teaching sheet.  Medication teaching packet.    Met with the patient to review the medications that are commonly used post-transplant. Pt uses [facility] and [facility]. Pt doesn't use a medication box but would be willing to use one post-transplant. Reviewed the post-transplant medication teaching sheet with patient (discussed names, indications, frequencies, common doses, potential adverse effects, and specific instructions for the transplant medications). The patients current medication related questions were answered.  Patient was instructed to call with further medication questions. There are no pharmacologic contraindications for transplantation. Will continue to monitor and follow throughout transplant process.    
yes

## 2020-10-26 NOTE — CONSULT NOTE ADULT - SUBJECTIVE AND OBJECTIVE BOX
HPI:   Patient is a 80y male with a past history of HTN, COPD,CAD,hypothyroidism,ascending aneurysm, BPH, s/p stent graft to repair AAA in late August /early September, treated with zosyn at his SNF for pneumonia in September, hospitalized at [facility] in mid- late September with hemoptysis vs hematemesis, resolved after a short admission, now admitted on 10/24 with shortness of breath.  His Chest CT scan in September showed B/L effusions, RT>Lt, he had a thoracentesis with fluid transudative.He had a CT scan of C/A/P in September with RUL atelectasis, endobronchial debris, and aortic graft stent with endoleak.  He was started on CTX and azithro on admission at [facility], now had vanco added with enterococcus in both sets.  He is sleepy and can not give a meaningful history.His urine culture is negative.    REVIEW OF SYSTEMS:  All other review of systems negative (Comprehensive ROS)    PAST MEDICAL & SURGICAL HISTORY:  Anemia    Cardiac arrhythmia    Anxiety    COPD (chronic obstructive pulmonary disease)    Aortic aneurysm    Heart disease  sp cardiac cath with stent placement    BPH (benign prostatic hyperplasia)    Hypertension    S/P coronary artery stent placement  1 stent- blocked artery        Allergies    No Known Allergies    Intolerances        Antimicrobials Day #  :day 3 CTX /azithro and day 1 vanco  azithromycin  IVPB 500 milliGRAM(s) IV Intermittent every 24 hours  azithromycin  IVPB      cefTRIAXone   IVPB      cefTRIAXone   IVPB 1000 milliGRAM(s) IV Intermittent every 24 hours  vancomycin  IVPB 1000 milliGRAM(s) IV Intermittent daily    Other Medications:  acetaminophen   Tablet .. 650 milliGRAM(s) Oral every 6 hours PRN  albumin human 25% IVPB 100 milliLiter(s) IV Intermittent every 6 hours  albuterol/ipratropium for Nebulization 3 milliLiter(s) Nebulizer every 4 hours  budesonide 160 MICROgram(s)/formoterol 4.5 MICROgram(s) Inhaler 2 Puff(s) Inhalation two times a day  chlorhexidine 2% Cloths 1 Application(s) Topical <User Schedule>  enoxaparin Injectable 40 milliGRAM(s) SubCutaneous daily  furosemide   Injectable 40 milliGRAM(s) IV Push every 12 hours  influenza   Vaccine 0.5 milliLiter(s) IntraMuscular once  levothyroxine 100 MICROGram(s) Oral daily  pantoprazole    Tablet 40 milliGRAM(s) Oral before breakfast  tamsulosin 0.4 milliGRAM(s) Oral two times a day      FAMILY HISTORY:  No significant family history        SOCIAL HISTORY:  Smoking:?   ETOH:  ?   Drug Use: ?      T(F): 97.6 (10-26-20 @ 08:00), Max: 98.5 (10-25-20 @ 12:00)  HR: 72 (10-26-20 @ 08:58)  BP: 122/72 (10-26-20 @ 08:00)  RR: 12 (10-26-20 @ 08:00)  SpO2: 97% (10-26-20 @ 08:58)  Wt(kg): --    PHYSICAL EXAM:  General: alert, no acute distress, very debilitated  Eyes:  anicteric, no conjunctival injection, no discharge  Oropharynx: no lesions or injection 	  Neck: supple, without adenopathy  Lungs: distant BS  Heart: regular rate and rhythm; no murmur,, distant tones  Abdomen: soft, nondistended, nontender, without mass or organomegaly  Skin: no lesions  Extremities: no clubbing, cyanosis, or edema  Neurologic: sleepy and poorly interactive    LAB RESULTS:                        7.8    8.66  )-----------( 342      ( 26 Oct 2020 06:30 )             24.9     10-26    134<L>  |  100  |  26<H>  ----------------------------<  153<H>  3.4<L>   |  26  |  1.41<H>    Ca    8.8      26 Oct 2020 06:30  Phos  4.2     10-26  Mg     1.9     10-    TPro  5.8<L>  /  Alb  1.4<L>  /  TBili  0.4  /  DBili  x   /  AST  28  /  ALT  20  /  AlkPhos  140<H>  10-24    LIVER FUNCTIONS - ( 24 Oct 2020 20:28 )  Alb: 1.4 g/dL / Pro: 5.8 g/dL / ALK PHOS: 140 U/L / ALT: 20 U/L / AST: 28 U/L / GGT: x           Urinalysis Basic - ( 24 Oct 2020 22:15 )    Color: Yellow / Appearance: Clear / S.010 / pH: x  Gluc: x / Ketone: Negative  / Bili: Negative / Urobili: Negative   Blood: x / Protein: 100 / Nitrite: Negative   Leuk Esterase: Moderate / RBC: 5-10 /HPF / WBC 6-10 /HPF   Sq Epi: x / Non Sq Epi: Neg.-Few / Bacteria: Few /HPF        MICROBIOLOGY:  RECENT CULTURES:  10-24 @ 22:15 .Urine Clean Catch (Midstream)     <10,000 CFU/mL Normal Urogenital Tari      10-24 @ 20:28 .Blood Blood-Peripheral Blood Culture PCR    enterococcus in 2 sets          RADIOLOGY REVIEWED:  < from: CT Chest No Cont (10.24.20 @ 23:17) >  IMPRESSION:  1. Large right pleural effusion with compressive atelectasis of the entire right lung and moderate to large left pleural effusion with partial compressive atelectasis of the left lower lobe which has intervally worsened.  2. Interstitial opacities in the left upper lobe which could be due to atelectasis versus an infectious etiology.  3. Debris nearly completely opacifying the right mainstem bronchus.    < end of copied text >  < from: Xray Chest 1 View- PORTABLE-Routine (Xray Chest 1 View- PORTABLE-Routine in AM.) (10.25.20 @ 07:19) >    < from: CT Angio Abdomen and Pelvis w/ Oral Cont and w/ IV Cont (20 @ 13:32) >    IMPRESSION:    8 mm soft tissue focus in the proximal right mainstem bronchus, either mucous secretions or polypoid mass (2:51    Moderate to large bilateral pleural effusions, greater on the right, with underlying compressive atelectasis of the right middle lobe and bilateral lower lobes. Emphysema. The right pleural effusion is partially loculated.    Ascending aortic aneurysm repair. No evidence of fistula formation between the aorta and airway. High density material in the esophagus is likely ingested as it is present prior toIV contrast administration.    Aortic stent graft extending from the mid descending thoracic aorta to the common iliac arteries. An endoleak is present in the distal abdominal aorta likely fed by retrograde flow in the inferior mesenteric artery.    The SMA and celiac axis have been bypassed. There is an occluded right renal artery stent with right renal atrophy.    Moderately constipated colon. High density material in the esophagus is likely ingested as it is present prior to IV contrast administration.    Cardiomegaly. Trace pericardial effusion.    Nonobstructing left renal calculus.    Gil balloon is inflated in the prostate and should be repositioned. Fiducial markers.    Bladder wall thickening secondary to under distention or cystitis.    Findings were discussed with Dr. YAZ CUEVAS [phone] 2020 2:50 PM by Dr. Dia with read back confirmation.      < end of copied text >  Impression:    Diffuse confluent opacity throughout the right lung with mild improved aeration at the right upper lung.    Small left pleural effusion with airspace opacity throughout the left lung.        < end of copied text >

## 2020-10-26 NOTE — DIETITIAN INITIAL EVALUATION ADULT. - OTHER INFO
80 year old male with PMH  of HTN, CAD (s/p coronary stent placement), COPD, hypothyroidism, Aortic aneurysm, anemia ,unknown cardiac arrhyhtmia, BPH presented  with complaints of SOB and hypoxia. Patient known from numerous previous admissions , dislikes hospital food , food intolerances noted, Skin is ecchymotic, no edema noted , (+) BM (10/25) UBW noted 140lbs , admit weight noted 140.2/63.6kg , BMI 18.7, Patient noted with evidence of severe protein calorie malnutrition, patient dislikes po Ensure supplements however will receive "Magic Cup" ,

## 2020-10-26 NOTE — PROGRESS NOTE ADULT - ASSESSMENT
Physical Examination:  GENERAL:               Alert, Oriented, No acute distress.    HEENT:                    Pupils equal, reactive to light.  Symmetric. No JVD, Moist MM  PULM:                     Decreased breath sounds bilaterally, Right worse than left  CVS:                         S1, S2,  No Murmur  ABD:                        Soft, nondistended, nontender, normoactive bowel sounds,   EXT:                         No edema, nontender, No Cyanosis or Clubbing   Vascular:                Warm Extremities, Normal Capillary refill, Normal Distal Pulses  SKIN:                       Warm and well perfused, no rashes noted.   NEURO:                  Alert, oriented, interactive, follows commands  PSYC:                      Calm, + Insight.      Assessment:  1. Acute respiratory failure  2. Bilateral pleural effusion  3. COPD  4. Right upper lobe collapse   5. Coronary artery disease   6. Hypertension   7. Hypothyroidism  8. Bacteremia     Plan  - Echo showing moderate LV dysfunction though no EF reported, also noted with left atrial mobile mass  - Will obtain cardiology consultation ?concern for endocarditis given bacteremia  - CT scan from september showing bilateral effusions with partial right lung collapse. Thoracentesis performed in september showing transudative effusion.   - POCUS showing bilateral pleural effusions, may need repeat thoracentesis on the right sided  - Cont. diuresis, add albumin for hypoalbuminemia  - D/c steroids, cont. symbicort and nebs  - ID consultation, add vancomycin to antibiotic regimen to cover for enterococcus  - Oxygen support to maintain saturation > 90%, HFNC  - Cont. thyroid medications and BPH medications  - Daughter Kaur Ramirez ([phone]) - message left for call back 10/26  - Prognosis is guarded, given recurrent hospitalizations.

## 2020-10-26 NOTE — CONSULT NOTE ADULT - SUBJECTIVE AND OBJECTIVE BOX
Chief Complaint: dyspnea    HPI: 80 yr old man with multiple medical problems including significant lv systolic dysfunction andd aortic dz. patient is growing enteroccoccus in his blood and has a questionable echogenic focus in the RA. Patiewnt is extremely lethargic and responds to voice but is unable to give any meaningful history.    PMH:   Anemia    Cardiac arrhythmia    Anxiety    COPD (chronic obstructive pulmonary disease)    Aortic aneurysm    Heart disease    BPH (benign prostatic hyperplasia)    Hypertension      PSH:   S/P coronary artery stent placement      Family History:  FAMILY HISTORY:  No significant family history        Social History:  Smoking:unknown but not currently  Alcohol:unknown but no currently  Drugs:unknown but not currently    Allergies:  No Known Allergies      Medications:  acetaminophen   Tablet .. 650 milliGRAM(s) Oral every 6 hours PRN  albumin human 25% IVPB 100 milliLiter(s) IV Intermittent every 6 hours  albuterol/ipratropium for Nebulization 3 milliLiter(s) Nebulizer every 4 hours  azithromycin  IVPB 500 milliGRAM(s) IV Intermittent every 24 hours  azithromycin  IVPB      budesonide 160 MICROgram(s)/formoterol 4.5 MICROgram(s) Inhaler 2 Puff(s) Inhalation two times a day  cefTRIAXone   IVPB      cefTRIAXone   IVPB 1000 milliGRAM(s) IV Intermittent every 24 hours  chlorhexidine 2% Cloths 1 Application(s) Topical <User Schedule>  enoxaparin Injectable 40 milliGRAM(s) SubCutaneous daily  furosemide   Injectable 40 milliGRAM(s) IV Push every 12 hours  influenza   Vaccine 0.5 milliLiter(s) IntraMuscular once  levothyroxine 100 MICROGram(s) Oral daily  pantoprazole    Tablet 40 milliGRAM(s) Oral before breakfast  tamsulosin 0.4 milliGRAM(s) Oral two times a day  vancomycin  IVPB 1000 milliGRAM(s) IV Intermittent daily      REVIEW OF SYSTEMS:  unable to obtain due to lethargy    Physical Exam:  T(C): 36.4 (10-26-20 @ 08:00), Max: 36.9 (10-25-20 @ 12:00)  HR: 75 (10-26-20 @ 10:00) (72 - 88)  BP: 131/86 (10-26-20 @ 10:00) (111/73 - 131/86)  RR: 11 (10-26-20 @ 10:00) (11 - 19)  SpO2: 95% (10-26-20 @ 10:00) (93% - 100%)  Wt(kg): --    GENERAL: very cachectic man appearing much older than stated age  HEAD:  Atraumatic, Normocephalic  EYES: EOMI, conjunctiva and sclera clear  ENT: Moist mucous membranes,  NECK: Supple, No JVD, no bruits  CHEST/LUNG: Clear to percussion bilaterally; No rales, rhonchi, wheezing, or rubs  HEART: Regular rate and rhythm; No murmurs, rubs, or gallops PMI non displaced.  ABDOMEN: Soft, Nontender, Nondistended; Bowel sounds present  EXTREMITIES:  2+ Peripheral Pulses, No clubbing, cyanosis, or edema  SKIN: significant ecchymoses  NERVOUS SYSTEM:  unable to cooperate    Cardiovascular Diagnostic Testing:  ECG:rsr no acute abnormalities    Labs:                        7.8    8.66  )-----------( 342      ( 26 Oct 2020 06:30 )             24.9     10-26    134<L>  |  100  |  26<H>  ----------------------------<  153<H>  3.4<L>   |  26  |  1.41<H>    Ca    8.8      26 Oct 2020 06:30  Phos  4.2     10-26  Mg     1.9     10-26    TPro  5.8<L>  /  Alb  1.4<L>  /  TBili  0.4  /  DBili  x   /  AST  28  /  ALT  20  /  AlkPhos  140<H>  10-24    PT/INR - ( 24 Oct 2020 20:28 )   PT: 14.0 sec;   INR: 1.17 ratio         PTT - ( 24 Oct 2020 20:28 )  PTT:39.9 sec  CARDIAC MARKERS ( 24 Oct 2020 20:28 )  <.017 ng/mL / x     / 18 U/L / x     / 1.6 ng/mL      Serum Pro-Brain Natriuretic Peptide: 3345 pg/mL (10-24 @ 20:28)  cxr-large pleural effusion on right with compressive atelectasis            Imaging:

## 2020-10-26 NOTE — CONSULT NOTE ADULT - ASSESSMENT
Imp    Extremely ill cachectic man with positive blood cultures and questionable finding on echo    for now will await recommendations from id wrt BRANDEN but physically it appears that risk of procedure may ooutweigh beneift. patient may very well not tolerate it.    agree with vascular evaluation wrt endoleak    due to lv dysfunction would at some point consider ace or arb and bblocker

## 2020-10-26 NOTE — PROGRESS NOTE ADULT - SUBJECTIVE AND OBJECTIVE BOX
Follow-up Critical Care Progress Note  Chief Complaint : Hypoxemia    Awake, not in distress. Denies any pain. Not in distress.     Allergies :No Known Allergies    PAST MEDICAL & SURGICAL HISTORY:  Anemia    Cardiac arrhythmia    Anxiety    COPD (chronic obstructive pulmonary disease)    Aortic aneurysm    Heart disease  sp cardiac cath with stent placement    BPH (benign prostatic hyperplasia)    Hypertension    S/P coronary artery stent placement  1 stent- blocked artery    Medications:  MEDICATIONS  (STANDING):  albumin human 25% IVPB 100 milliLiter(s) IV Intermittent every 6 hours  albuterol/ipratropium for Nebulization 3 milliLiter(s) Nebulizer every 4 hours  azithromycin  IVPB 500 milliGRAM(s) IV Intermittent every 24 hours  azithromycin  IVPB      budesonide 160 MICROgram(s)/formoterol 4.5 MICROgram(s) Inhaler 2 Puff(s) Inhalation two times a day  cefTRIAXone   IVPB      cefTRIAXone   IVPB 1000 milliGRAM(s) IV Intermittent every 24 hours  chlorhexidine 2% Cloths 1 Application(s) Topical <User Schedule>  enoxaparin Injectable 40 milliGRAM(s) SubCutaneous daily  furosemide   Injectable 40 milliGRAM(s) IV Push every 12 hours  influenza   Vaccine 0.5 milliLiter(s) IntraMuscular once  levothyroxine 100 MICROGram(s) Oral daily  methylPREDNISolone sodium succinate Injectable 20 milliGRAM(s) IV Push every 8 hours  pantoprazole    Tablet 40 milliGRAM(s) Oral before breakfast  tamsulosin 0.4 milliGRAM(s) Oral two times a day  vancomycin  IVPB 1000 milliGRAM(s) IV Intermittent every 12 hours    MEDICATIONS  (PRN):  acetaminophen   Tablet .. 650 milliGRAM(s) Oral every 6 hours PRN Temp greater or equal to 38C (100.4F), Mild Pain (1 - 3)    LABS:                      7.8    8.66  )-----------( 342      ( 26 Oct 2020 06:30 )             24.9     10-26  134<L>  |  100  |  26<H>  ----------------------------<  153<H>  3.4<L>   |  26  |  1.41<H>    Ca    8.8      26 Oct 2020 06:30  Phos  4.2     10-26  Mg     1.9     10-26    TPro  5.8<L>  /  Alb  1.4<L>  /  TBili  0.4  /  DBili  x   /  AST  28  /  ALT  20  /  AlkPhos  140<H>  10-24    CARDIAC MARKERS ( 24 Oct 2020 20:28 )  <.017 ng/mL / x     / 18 U/L / x     / 1.6 ng/mL    PT/INR - ( 24 Oct 2020 20:28 )   PT: 14.0 sec;   INR: 1.17 ratio       PTT - ( 24 Oct 2020 20:28 )  PTT:39.9 sec  Urinalysis Basic - ( 24 Oct 2020 22:15 )    Color: Yellow / Appearance: Clear / S.010 / pH: x  Gluc: x / Ketone: Negative  / Bili: Negative / Urobili: Negative   Blood: x / Protein: 100 / Nitrite: Negative   Leuk Esterase: Moderate / RBC: 5-10 /HPF / WBC 6-10 /HPF   Sq Epi: x / Non Sq Epi: Neg.-Few / Bacteria: Few /HPF    Serum Pro-Brain Natriuretic Peptide: 3345 pg/mL (10-24-20 @ 20:28)    CULTURES: (if applicable)    Culture - Urine (collected 10-24-20 @ 22:15)  Source: .Urine Clean Catch (Midstream)  Final Report (10-26-20 @ 07:55):    <10,000 CFU/mL Normal Urogenital Tari    Culture - Blood (collected 10-24-20 @ 20:28)  Source: .Blood Blood-Peripheral  Gram Stain (10-25-20 @ 16:04):    Growth in aerobic and anaerobic bottles: Gram Positive Cocci in Pairs and    Chains  Preliminary Report (10-25-20 @ 16:04):    Growth in aerobic and anaerobic bottles: Gram Positive Cocci in Pairs and    Chains    Culture - Blood (collected 10-24-20 @ 20:28)  Source: .Blood Blood-Peripheral  Gram Stain (10-25-20 @ 16:01):    Growth in anaerobic bottle: Gram Positive Cocci in Pairs and Chains    Growth in aerobic bottle: Gram Positive Cocci in Pairs and Chains  Preliminary Report (10-25-20 @ 16:02):    Growth in anaerobic bottle: Gram Positive Cocci in Pairs and Chains    Growth in aerobic bottle: Gram Positive Cocci in Pairs and Chains    "Due to technical problems, Proteus sp. will Not be reported as part of    the BCID panel until further notice" ***Blood Panel PCR results on this    specimen are available    approximately 3 hours after the Gram stain result.***    Gram stain, PCR, and/or culture results may not always    correspond due to difference in methodologies.    ************************************************************    This PCR assay was performed using [employer].    The following targets are tested for: Enterococcus,    vancomycin resistant enterococci, Listeria monocytogenes,    coagulase negative staphylococci, S. aureus,    methicillin resistant S. aureus, Streptococcus agalactiae    (Group B), S. pneumoniae, S. pyogenes (Group A),    Acinetobacter baumannii, Enterobacter cloacae, E. coli,    Klebsiella oxytoca, K. pneumoniae, Proteus sp.,    Serratia marcescens, Haemophilus influenzae,    Neisseria meningitidis, Pseudomonas aeruginosa, Candida    albicans, C. glabrata, C krusei, C parapsilosis,    C. tropicalis and the KPC resistance gene.  Organism: Blood Culture PCR (10-25-20 @ 14:39)  Organism: Blood Culture PCR (10-25-20 @ 14:39)      -  Enterococcus species: Detec      Method Type: PCR    AB - ( 25 Oct 2020 05:01 )  pH, Arterial: 7.41  pH, Blood: x     /  pCO2: 37    /  pO2: 79    / HCO3: x     / Base Excess: x     /  SaO2: 94          VITALS:  T(C): 36.4 (10-26-20 @ 08:00), Max: 36.9 (10-25-20 @ 12:00)  T(F): 97.6 (10-26-20 @ 08:00), Max: 98.5 (10-25-20 @ 12:00)  HR: 72 (10-26-20 @ 08:58) (72 - 88)  BP: 122/72 (10-26-20 @ 08:00) (98/63 - 127/79)  BP(mean): 88 (10-26-20 @ 08:00) (76 - 95)  ABP: --  ABP(mean): --  RR: 12 (10-26-20 @ 08:00) (12 - 19)  SpO2: 97% (10-26-20 @ 08:58) (90% - 100%)  CVP(mm Hg): --  CVP(cm H2O): --    Ins and Outs     10-25-20 @ 07:01  -  10-26-20 @ 07:00  --------------------------------------------------------  IN: 900 mL / OUT: 740 mL / NET: 160 mL        Height (cm): 182.9 (10-25-20 @ 00:30)  Weight (kg): 62.6 (10-25-20 @ 00:30)  BMI (kg/m2): 18.7 (10-25-20 @ 00:30)        I&O's Detail    25 Oct 2020 07:01  -  26 Oct 2020 07:00  --------------------------------------------------------  IN:    IV PiggyBack: 250 mL    IV PiggyBack: 50 mL    Oral Fluid: 600 mL  Total IN: 900 mL    OUT:    Indwelling Catheter - Urethral (mL): 740 mL  Total OUT: 740 mL    Total NET: 160 mL

## 2020-10-26 NOTE — PROVIDER CONTACT NOTE (OTHER) - SITUATION
Pt grossly incontinent, having loose stool and is a max assist, completely dependent. During patient care, dressing and stitch for Permacath was loosened.

## 2020-10-26 NOTE — CONSULT NOTE ADULT - ASSESSMENT
81 yo male with HTN,BPH,CAD,COPD,and recent AAA repair at [facility] is now admitted with respiratory distress and high grade enterococcal bacteremia.  He has  a history of transudative effusions in past and appears to have multiple reasons for shortness of breath-effusions, atelectasis, and infiltrates.  Enterococcus is not a respiratory pathogen.  The organism is not growing in the urine, therefor a share concerns of endocarditis as well as graft infection. With his recent ? GI bleed, I am concerned about status of aneurysm and graft.  Suggest:  1.Agree with addition of vanco to regimen pending sensitivities of enterococcus  2.Repeat blood cultures to assess status of bacteremia  3.Favor Echo, transthoracic, may need BRANDEN  4.Consider vascular input, ? if CT with contrast to assess graft should be done to look for signs of graft infection  5.Can limit azithro to 3 days if legionella antigen is negative  6.CTX for pneumonia coverage

## 2020-10-27 NOTE — PROCEDURE NOTE - NSPROCDETAILS_GEN_ALL_CORE
percutaneous/Seldinger technique/sterile dressing applied/thoracostomy tube placed percutaneously/dressing applied/secured in place/ultrasound assessment of fluid (location)

## 2020-10-27 NOTE — PROGRESS NOTE ADULT - ASSESSMENT
80M admitted to ICU with hypoxemic respiratory failure secondary to combination of large right sided pleural effusion as well as significant mucus plug, with likely underlying PNA. Patient has required HFNC with high FIO2 need since ICU arrival.    Events last 24 hours:   -S/P right sided pigtail chest tube placement today with improvement in pleural effusion  -remains on HFNC however, FIO2 and flow have both been titrated down.  (40%, 40 LPM)    PLAN:    #Acute hypoxic respiratory failure w/ large right plural effusion and mucous plug/atelectasis  -patient remains on HFNC, 40% FIO2 and 40 LPM flow rate, this is improved from prior oxygen need. Will continue to titrate down both FIO2 and LPM, with goal of trialing NC in the morning.  -have stopped hypertonic saline/mucomyst nebs, will continue duonebs and budesonide  -S/P pigtail CT, will monitor output, replace with albumin 25% if output >1L  -diurese with lasix daily while monitoring lytes.   -for likely underlying PNA patient is on rocephin, vanco, and azithro. Will need to complete course.  -continue with aggressive chest PT, pulmonary toilet, bed percussion, and IS use      #Anemia   Hb noted to drop to 6.3 yesterday, recieved 1 unit PRBC with adequate response to 7.8. No active signs of bleeding  -patient is on erythropoeitn stimulation medications at home, would have nephro/heme eval patient regarding continued use given anemia     #hypothyroid   - Patient received synthroid 100mcg, c/w current dosage     #BPH  - Patient on flomax 0.4mg twice daily. Will continue to prevent retention     #Dvt prophylaxis   - Lovenox 40mg subq once daily     #GI prophylaxis   - Protonix 40mg IVP once daily

## 2020-10-27 NOTE — PROGRESS NOTE ADULT - ASSESSMENT
Physical Examination:  GENERAL:               Alert, Oriented, No acute distress.    HEENT:                    Pupils equal, reactive to light.  Symmetric. No JVD, Moist MM  PULM:                     Decreased breath sounds bilaterally, Right worse than left  CVS:                         S1, S2,  No Murmur  ABD:                        Soft, nondistended, nontender, normoactive bowel sounds,   EXT:                         No edema, nontender, No Cyanosis or Clubbing   Vascular:                Warm Extremities, Normal Capillary refill, Normal Distal Pulses  SKIN:                       Warm and well perfused, no rashes noted.   NEURO:                  Alert, oriented, interactive, follows commands  PSYC:                      Calm, + Insight.      Assessment:  1. Acute respiratory failure  2. Bilateral pleural effusion  3. COPD  4. Right upper lobe collapse   5. Coronary artery disease   6. Hypertension   7. Hypothyroidism  8. Bacteremia - enterococcus faecalis     Plan  - Echo showing moderate LV dysfunction though no EF reported, also noted with left atrial mobile mass  - Cardiology consult appreciated, may need BRANDEN, though patient is high risk for any intervention given extensive comorbid conditions.   - CT scan from september showing bilateral effusions with partial right lung collapse. Thoracentesis performed in september showing transudative effusion.   - Given worsening respiratory status, will place right sided chest tube  - Cont. diuresis with albumin for hypoalbuminemia  - cont. symbicort and nebs  - Anemia, but no obvious bleeding noted. Will transfuse 1 unit PRBC and monitor  - Supplement electrolytes  - ID consultation, will continue antibiotics for now   - F/u repeat blood cultures  - Will obtain CT scan of the chest once stable  - Oxygen support to maintain saturation > 90%, HFNC  - Cont. thyroid medications and BPH medications  - Daughter Kaur Ramirez ([phone]) - updated about clinical condition 10/27  - Prognosis is guarded, given recurrent hospitalizations

## 2020-10-27 NOTE — PROGRESS NOTE ADULT - SUBJECTIVE AND OBJECTIVE BOX
Patient is a 80y old  Male who presents with a chief complaint of SOB/Hypoxia/Right lung white out/mucous plug (27 Oct 2020 21:08)      BRIEF HOSPITAL COURSE:     80M admitted to ICU with hypoxemic respiratory failure secondary to combination of large right sided pleural effusion as well as significant mucus plug, with likely underlying PNA. Patient has required HFNC with high FIO2 need since ICU arrival.    Events last 24 hours:   -S/P right sided pigtail chest tube placement today with improvement in pleural effusion  -remains on HFNC however, FIO2 and flow have both been titrated down.  (40%, 40 LPM)    PAST MEDICAL & SURGICAL HISTORY:  Anemia    Cardiac arrhythmia    Anxiety    COPD (chronic obstructive pulmonary disease)    Aortic aneurysm    Heart disease  sp cardiac cath with stent placement    BPH (benign prostatic hyperplasia)    Hypertension    S/P coronary artery stent placement  1 stent- blocked artery        Review of Systems:  CONSTITUTIONAL: No fever, chills, or fatigue  EYES: No eye pain, visual disturbances, or discharge  ENMT:  No difficulty hearing, tinnitus, vertigo; No sinus or throat pain  NECK: No pain or stiffness  RESPIRATORY: No cough, wheezing, chills or hemoptysis; No shortness of breath  CARDIOVASCULAR: No chest pain, palpitations, dizziness, or leg swelling  GASTROINTESTINAL: No abdominal or epigastric pain. No nausea, vomiting, or hematemesis; No diarrhea or constipation. No melena or hematochezia.  GENITOURINARY: No dysuria, frequency, hematuria, or incontinence  NEUROLOGICAL: No headaches, memory loss, loss of strength, numbness, or tremors  SKIN: No itching, burning, rashes, or lesions   MUSCULOSKELETAL: No joint pain or swelling; No muscle, back, or extremity pain  PSYCHIATRIC: No depression, anxiety, mood swings, or difficulty sleeping      Medications:  azithromycin  IVPB 500 milliGRAM(s) IV Intermittent every 24 hours  azithromycin  IVPB      cefTRIAXone   IVPB      cefTRIAXone   IVPB 1000 milliGRAM(s) IV Intermittent every 24 hours  vancomycin  IVPB 1000 milliGRAM(s) IV Intermittent daily    furosemide   Injectable 40 milliGRAM(s) IV Push every 12 hours  tamsulosin 0.4 milliGRAM(s) Oral two times a day    albuterol/ipratropium for Nebulization 3 milliLiter(s) Nebulizer every 4 hours  budesonide 160 MICROgram(s)/formoterol 4.5 MICROgram(s) Inhaler 2 Puff(s) Inhalation two times a day    acetaminophen   Tablet .. 650 milliGRAM(s) Oral every 6 hours PRN  ALPRAZolam 0.25 milliGRAM(s) Oral at bedtime      enoxaparin Injectable 40 milliGRAM(s) SubCutaneous daily    pantoprazole    Tablet 40 milliGRAM(s) Oral before breakfast      levothyroxine 100 MICROGram(s) Oral daily    albumin human 25% IVPB 100 milliLiter(s) IV Intermittent every 6 hours  magnesium sulfate  IVPB 1 Gram(s) IV Intermittent every 1 hour    influenza   Vaccine 0.5 milliLiter(s) IntraMuscular once    chlorhexidine 2% Cloths 1 Application(s) Topical <User Schedule>  lidocaine   Patch 1 Patch Transdermal every 24 hours            ICU Vital Signs Last 24 Hrs  T(C): 36.4 (27 Oct 2020 20:00), Max: 36.8 (27 Oct 2020 17:00)  T(F): 97.5 (27 Oct 2020 20:00), Max: 98.2 (27 Oct 2020 17:00)  HR: 82 (27 Oct 2020 20:00) (72 - 91)  BP: 138/67 (27 Oct 2020 20:00) (100/73 - 149/120)  BP(mean): 89 (27 Oct 2020 20:00) (83 - 140)  RR: 17 (27 Oct 2020 20:00) (12 - 24)  SpO2: 93% (27 Oct 2020 20:00) (91% - 100%)          I&O's Detail    26 Oct 2020 07:01  -  27 Oct 2020 07:00  --------------------------------------------------------  IN:    Albumin 25%  -  50 mL: 500 mL    IV PiggyBack: 250 mL    IV PiggyBack: 50 mL    IV PiggyBack: 250 mL  Total IN: 1050 mL    OUT:    Indwelling Catheter - Urethral (mL): 1405 mL  Total OUT: 1405 mL    Total NET: -355 mL      27 Oct 2020 07:01  -  27 Oct 2020 21:19  --------------------------------------------------------  IN:    Albumin 25%  -  50 mL: 200 mL    IV PiggyBack: 300 mL    IV PiggyBack: 100 mL    IV PiggyBack: 250 mL    Oral Fluid: 660 mL    PRBCs (Packed Red Blood Cells): 350 mL  Total IN: 1860 mL    OUT:    Chest Tube (mL): 2330 mL    Indwelling Catheter - Urethral (mL): 985 mL  Total OUT: 3315 mL    Total NET: -1455 mL            LABS:                        7.8    11.89 )-----------( 301      ( 27 Oct 2020 16:31 )             24.6     10-27    136  |  101  |  22  ----------------------------<  113<H>  3.5   |  25  |  1.21    Ca    8.7      27 Oct 2020 16:31  Phos  2.6     10-27  Mg     1.9     10-27    TPro  6.6  /  Alb  2.9<L>  /  TBili  0.3  /  DBili  x   /  AST  11  /  ALT  15  /  AlkPhos  103  10-27          CAPILLARY BLOOD GLUCOSE            CULTURES:  Culture Results:   <10,000 CFU/mL Normal Urogenital Tari (10-24-20 @ 22:15)  Culture Results:   Growth in aerobic and anaerobic bottles: Enterococcus faecalis  "Due to technical problems, Proteus sp. will Not be reported as part of  the BCID panel until further notice" ***Blood Panel PCR results on this  specimen are available  approximately 3 hours after the Gram stain result.***  Gram stain, PCR, and/or culture results may not always  correspond due to difference in methodologies.  ************************************************************  This PCR assay was performed using [employer].  The following targets are tested for: Enterococcus,  vancomycin resistant enterococci, Listeria monocytogenes,  coagulase negative staphylococci, S. aureus,  methicillin resistant S. aureus, Streptococcus agalactiae  (Group B), S. pneumoniae, S. pyogenes (Group A),  Acinetobacter baumannii, Enterobacter cloacae, E. coli,  Klebsiella oxytoca, K. pneumoniae, Proteus sp.,  Serratia marcescens, Haemophilus influenzae,  Neisseria meningitidis, Pseudomonas aeruginosa, Candida  albicans, C. glabrata, C krusei, C parapsilosis,  C. tropicalis and the KPC resistance gene. (10-24-20 @ 20:28)  Culture Results:   Growth in aerobic and anaerobic bottles: Enterococcus faecalis  See previous culture [id number] (10-24-20 @ 20:28)      Physical Examination:    General: No acute distress.  Alert, oriented, interactive, nonfocal. On HFNC    HEENT: Pupils equal, reactive to light.  Symmetric.    PULM: breath sounds diminished, however improved since prior exams. , no significant sputum production    CVS: Regular rate and rhythm, no murmurs, rubs, or gallops    ABD: Soft, nondistended, nontender, normoactive bowel sounds, no masses    EXT: No edema, nontender    SKIN: Warm and well perfused, no rashes noted.

## 2020-10-27 NOTE — PROGRESS NOTE ADULT - ASSESSMENT
80y male with a PMH of HTN, COPD, CAD, hypothyroidism, ascending aneurysm, BPH, s/p stent graft to repair AAA in late August /early September at [facility]  His Chest CT scan in September showed B/L effusions, RT>Lt, RUL atelectasis, endobronchial debris, and aortic graft stent with endoleak. S/p thoracentesis with fluid transudative. Was treated with Zosyn    Now admitted on 10/24 with c/o SOB & started CTX and azithro at [facility]  He has multiple reasons for shortness of breath-effusions, atelectasis, and infiltrates.  Vancomycin added after blood cx recovered Enterococcus which is not a respiratory pathogen.  The organism is not growing in the urine, therefore raises concerns of endocarditis as well as graft infection. With his recent ? GI bleed, I am concerned about status of aneurysm and graft.    Suggest:  1.Continue vanco to regimen pending sensitivities of enterococcus  2. Follow repeat blood cultures to assess status of bacteremia  3. Favor Echo, transthoracic, may need BRANDEN  4.Consider vascular input, ? if CT with contrast to assess graft should be done to look for signs of graft infection  5.Can limit azithromycin if legionella antigen is negative  6.Can continue empiric CTX for now for pneumonia coverage  Case reviewed with intensivist

## 2020-10-27 NOTE — PROGRESS NOTE ADULT - SUBJECTIVE AND OBJECTIVE BOX
CC: f/u for high grade Enterococcus bacteremia     Patient reports that his back hurts him for weeks & still SOB    REVIEW OF SYSTEMS:  All other review of systems negative (Comprehensive ROS) except as above     Antimicrobials Day #  : 4  azithromycin  IVPB 500 milliGRAM(s) IV Intermittent every 24 hours  azithromycin  IVPB      cefTRIAXone   IVPB 1000 milliGRAM(s) IV Intermittent every 24 hours  cefTRIAXone   IVPB      vancomycin  IVPB 1000 milliGRAM(s) IV Intermittent daily    Other Medications Reviewed    T(F): 97.8 (10-27-20 @ 07:00), Max: 97.9 (10-26-20 @ 11:00)  HR: 82 (10-27-20 @ 09:00)  BP: 145/121 (10-27-20 @ 09:00)  RR: 17 (10-27-20 @ 09:00)  SpO2: 95% (10-27-20 @ 09:00)  Wt(kg): --    PHYSICAL EXAM:  General: alert, no acute distress  Eyes:  anicteric, no conjunctival injection, no discharge  Oropharynx: no lesions or injection 	  Neck: supple, without adenopathy  Lungs: clear to auscultation, decreased at bases R > L  Heart: regular rate and rhythm; no murmurs  Abdomen: soft, nondistended, nontender, without mass or organomegaly  Skin: no lesions  Extremities: no clubbing, cyanosis, or edema  Neurologic: alert, oriented, moves all extremities    LAB RESULTS:                        6.3    10.13 )-----------( 290      ( 27 Oct 2020 08:26 )             20.1     10-27    138  |  100  |  24<H>  ----------------------------<  127<H>  3.0<L>   |  27  |  1.42<H>    Ca    9.0      27 Oct 2020 05:20  Phos  3.3     10-27  Mg     1.9     10-27    TPro  6.6  /  Alb  2.9<L>  /  TBili  0.3  /  DBili  x   /  AST  11  /  ALT  15  /  AlkPhos  103  10-27    LIVER FUNCTIONS - ( 27 Oct 2020 05:20 )  Alb: 2.9 g/dL / Pro: 6.6 g/dL / ALK PHOS: 103 U/L / ALT: 15 U/L / AST: 11 U/L / GGT: x             MICROBIOLOGY:  RECENT CULTURES:  10-24 @ 22:15 .Urine Clean Catch (Midstream)     <10,000 CFU/mL Normal Urogenital Tari      10-24 @ 20:28 .Blood Blood-Peripheral Blood Culture PCR    Growth in aerobic and anaerobic bottles: Enterococcus faecalis        RADIOLOGY REVIEWED:

## 2020-10-27 NOTE — PROGRESS NOTE ADULT - SUBJECTIVE AND OBJECTIVE BOX
Follow-up Critical Care Progress Note  Chief Complaint : Hypoxemia    Overnight with worsening hypoxia, appears comfortable. Noted with drop in Hgb ,but no obvious signs of bleeding.     Allergies :No Known Allergies    PAST MEDICAL & SURGICAL HISTORY:  Anemia    Cardiac arrhythmia    Anxiety    COPD (chronic obstructive pulmonary disease)    Aortic aneurysm    Heart disease  sp cardiac cath with stent placement    BPH (benign prostatic hyperplasia)    Hypertension    S/P coronary artery stent placement  1 stent- blocked artery    Medications:  MEDICATIONS  (STANDING):  albumin human 25% IVPB 100 milliLiter(s) IV Intermittent every 6 hours  albuterol/ipratropium for Nebulization 3 milliLiter(s) Nebulizer every 4 hours  azithromycin  IVPB 500 milliGRAM(s) IV Intermittent every 24 hours  azithromycin  IVPB      budesonide 160 MICROgram(s)/formoterol 4.5 MICROgram(s) Inhaler 2 Puff(s) Inhalation two times a day  cefTRIAXone   IVPB 1000 milliGRAM(s) IV Intermittent every 24 hours  cefTRIAXone   IVPB      chlorhexidine 2% Cloths 1 Application(s) Topical <User Schedule>  enoxaparin Injectable 40 milliGRAM(s) SubCutaneous daily  furosemide   Injectable 40 milliGRAM(s) IV Push every 12 hours  influenza   Vaccine 0.5 milliLiter(s) IntraMuscular once  levothyroxine 100 MICROGram(s) Oral daily  lidocaine   Patch 1 Patch Transdermal every 24 hours  pantoprazole    Tablet 40 milliGRAM(s) Oral before breakfast  tamsulosin 0.4 milliGRAM(s) Oral two times a day  vancomycin  IVPB 1000 milliGRAM(s) IV Intermittent daily    MEDICATIONS  (PRN):  acetaminophen   Tablet .. 650 milliGRAM(s) Oral every 6 hours PRN Temp greater or equal to 38C (100.4F), Mild Pain (1 - 3)    LABS:                      6.8    11.17 )-----------( 328      ( 27 Oct 2020 05:20 )             22.4     10-27    138  |  100  |  24<H>  ----------------------------<  127<H>  3.0<L>   |  27  |  1.42<H>    Ca    9.0      27 Oct 2020 05:20  Phos  3.3     10-27  Mg     1.9     10-27    TPro  6.6  /  Alb  2.9<L>  /  TBili  0.3  /  DBili  x   /  AST  11  /  ALT  15  /  AlkPhos  103  10-27    Serum Pro-Brain Natriuretic Peptide: 3345 pg/mL (10-24-20 @ 20:28)    CULTURES: (if applicable)    Culture - Urine (collected 10-24-20 @ 22:15)  Source: .Urine Clean Catch (Midstream)  Final Report (10-26-20 @ 07:55):    <10,000 CFU/mL Normal Urogenital Tari    Culture - Blood (collected 10-24-20 @ 20:28)  Source: .Blood Blood-Peripheral  Gram Stain (10-25-20 @ 16:04):    Growth in aerobic and anaerobic bottles: Gram Positive Cocci in Pairs and    Chains  Preliminary Report (10-26-20 @ 19:06):    Growth in aerobic and anaerobic bottles: Enterococcus faecalis    See previous culture [id number]    Culture - Blood (collected 10-24-20 @ 20:28)  Source: .Blood Blood-Peripheral  Gram Stain (10-25-20 @ 16:01):    Growth in anaerobic bottle: Gram Positive Cocci in Pairs and Chains    Growth in aerobic bottle: Gram Positive Cocci in Pairs and Chains  Preliminary Report (10-26-20 @ 17:55):    Growth in aerobic and anaerobic bottles: Enterococcus faecalis    "Due to technical problems, Proteus sp. will Not be reported as part of    the BCID panel until further notice" ***Blood Panel PCR results on this    specimen are available    approximately 3 hours after the Gram stain result.***    Gram stain, PCR, and/or culture results may not always    correspond due to difference in methodologies.    ************************************************************    This PCR assay was performed using [employer].    The following targets are tested for: Enterococcus,    vancomycin resistant enterococci, Listeria monocytogenes,    coagulase negative staphylococci, S. aureus,    methicillin resistant S. aureus, Streptococcus agalactiae    (Group B), S. pneumoniae, S. pyogenes (Group A),    Acinetobacter baumannii, Enterobacter cloacae, E. coli,    Klebsiella oxytoca, K. pneumoniae, Proteus sp.,    Serratia marcescens, Haemophilus influenzae,    Neisseria meningitidis, Pseudomonas aeruginosa, Candida    albicans, C. glabrata, C krusei, C parapsilosis,    C. tropicalis and the KPC resistance gene.  Organism: Blood Culture PCR (10-25-20 @ 14:39)  Organism: Blood Culture PCR (10-25-20 @ 14:39)      -  Enterococcus species: Detec      Method Type: PCR    VITALS:  T(C): 36.6 (10-27-20 @ 07:00), Max: 36.6 (10-26-20 @ 11:00)  T(F): 97.8 (10-27-20 @ 07:00), Max: 97.9 (10-26-20 @ 11:00)  HR: 82 (10-27-20 @ 09:00) (73 - 91)  BP: 145/121 (10-27-20 @ 09:00) (127/85 - 147/134)  BP(mean): 129 (10-27-20 @ 09:00) (83 - 140)  ABP: --  ABP(mean): --  RR: 17 (10-27-20 @ 09:00) (11 - 17)  SpO2: 95% (10-27-20 @ 09:00) (86% - 99%)  CVP(mm Hg): --  CVP(cm H2O): --    Ins and Outs     10-26-20 @ 07:01  -  10-27-20 @ 07:00  --------------------------------------------------------  IN: 1050 mL / OUT: 1405 mL / NET: -355 mL  Height (cm): 182.9 (10-25-20 @ 00:30)  Weight (kg): 62.6 (10-25-20 @ 00:30)  BMI (kg/m2): 18.7 (10-25-20 @ 00:30)    I&O's Detail    26 Oct 2020 07:01  -  27 Oct 2020 07:00  --------------------------------------------------------  IN:    Albumin 25%  -  50 mL: 500 mL    IV PiggyBack: 250 mL    IV PiggyBack: 50 mL    IV PiggyBack: 250 mL  Total IN: 1050 mL    OUT:    Indwelling Catheter - Urethral (mL): 1405 mL  Total OUT: 1405 mL    Total NET: -355 mL

## 2020-10-27 NOTE — PROGRESS NOTE ADULT - SUBJECTIVE AND OBJECTIVE BOX
Follow up for  sepsis  SUBJ:    lethargic    PMH  Anemia    Cardiac arrhythmia    Anxiety    COPD (chronic obstructive pulmonary disease)    Aortic aneurysm    Heart disease    BPH (benign prostatic hyperplasia)    Hypertension        MEDICATIONS  (STANDING):  albumin human 25% IVPB 100 milliLiter(s) IV Intermittent every 6 hours  albuterol/ipratropium for Nebulization 3 milliLiter(s) Nebulizer every 4 hours  azithromycin  IVPB 500 milliGRAM(s) IV Intermittent every 24 hours  azithromycin  IVPB      budesonide 160 MICROgram(s)/formoterol 4.5 MICROgram(s) Inhaler 2 Puff(s) Inhalation two times a day  cefTRIAXone   IVPB      cefTRIAXone   IVPB 1000 milliGRAM(s) IV Intermittent every 24 hours  chlorhexidine 2% Cloths 1 Application(s) Topical <User Schedule>  enoxaparin Injectable 40 milliGRAM(s) SubCutaneous daily  furosemide   Injectable 40 milliGRAM(s) IV Push every 12 hours  influenza   Vaccine 0.5 milliLiter(s) IntraMuscular once  levothyroxine 100 MICROGram(s) Oral daily  lidocaine   Patch 1 Patch Transdermal every 24 hours  pantoprazole    Tablet 40 milliGRAM(s) Oral before breakfast  tamsulosin 0.4 milliGRAM(s) Oral two times a day  vancomycin  IVPB 1000 milliGRAM(s) IV Intermittent daily    MEDICATIONS  (PRN):  acetaminophen   Tablet .. 650 milliGRAM(s) Oral every 6 hours PRN Temp greater or equal to 38C (100.4F), Mild Pain (1 - 3)        PHYSICAL EXAM:  Vital Signs Last 24 Hrs  T(C): 36.7 (27 Oct 2020 13:00), Max: 36.7 (27 Oct 2020 13:00)  T(F): 98 (27 Oct 2020 13:00), Max: 98 (27 Oct 2020 13:00)  HR: 75 (27 Oct 2020 14:49) (74 - 91)  BP: 129/103 (27 Oct 2020 14:00) (128/112 - 149/120)  BP(mean): 110 (27 Oct 2020 14:00) (83 - 140)  RR: 14 (27 Oct 2020 14:49) (12 - 17)  SpO2: 97% (27 Oct 2020 14:49) (86% - 100%)    GENERAL: acutely and chronically ill  HEAD:  Atraumatic, Normocephalic  EYES: EOMI, PERRLA, conjunctiva and sclera clear  ENT: Moist mucous membranes,  NECK: Supple, No JVD, no bruits  CHEST/LUNG: Clear to percussion bilaterally; No rales, rhonchi, wheezing, or rubs  HEART: Regular rate and rhythm; No murmurs, rubs, or gallops PMI non displaced.  ABDOMEN: Soft, Nontender, Nondistended; Bowel sounds present  EXTREMITIES:  2+ Peripheral Pulses, No clubbing, cyanosis, or edema  SKIN: No rashes or lesions  NERVOUS SYSTEM:  Cranial Nerves II-XII intact      TELEMETRY:    ECG:    k< from: 12 Lead ECG (10.24.20 @ 20:32) >    Diagnosis Line Normal sinus rhythm with premature atrial contractions.  Low voltage QRS  Abnormal ECG  Confirmed by DAVID ZHU, ANH ARCE ([id number]) on 10/25/2020 8:42:52 AM    < end of copied text >    ECHO:    < from: TTE Echo Complete w/o Contrast w/ Doppler (10.25.20 @ 09:11) >  Summary:   1. Mildly to moderately decreased global left ventricular systolic function.   2. Spectral Doppler shows impaired relaxation pattern of left ventricular myocardial filling (Grade I diastolic dysfunction).   3. There is moderate concentric left ventricular hypertrophy.   4. Dyskinetic apex.   5. Mildly increased RV wall thickness.   6. Possible mobile opacification in left atrium.   7. Mildly enlarged right atrium.   8. Moderate pleural effusion in both left andright lateral regions.   9. Mild thickening and calcification of the anterior and posterior mitral valve leaflets.  10. Mild tricuspid regurgitation.  11. Sclerotic aortic valve with decreased opening.  12. Dilatation of the aortic root.    [id number] Anh Wilson MD, [facility] , Electronically signed on 10/25/2020 at 12:56:22 PM      *** Final ***      ANH WILSON   This document has been electronically signed. Oct 25 2020  9:11AM    < end of copied text >      LABS:                        6.3    10.13 )-----------( 290      ( 27 Oct 2020 08:26 )             20.1     10-27    138  |  100  |  24<H>  ----------------------------<  127<H>  3.0<L>   |  27  |  1.42<H>    Ca    9.0      27 Oct 2020 05:20  Phos  3.3     10-27  Mg     1.9     10-27    TPro  6.6  /  Alb  2.9<L>  /  TBili  0.3  /  DBili  x   /  AST  11  /  ALT  15  /  AlkPhos  103  10-27      I&O's Summary    26 Oct 2020 07:01  -  27 Oct 2020 07:00  --------------------------------------------------------  IN: 1050 mL / OUT: 1405 mL / NET: -355 mL    27 Oct 2020 07:01  -  27 Oct 2020 14:59  --------------------------------------------------------  IN: 1360 mL / OUT: 2440 mL / NET: -1080 mL      BNP    RADIOLOGY & ADDITIONAL STUDIES:    r< from: Xray Chest 1 View- PORTABLE-Urgent (Xray Chest 1 View- PORTABLE-Urgent .) (10.27.20 @ 12:47) >  FINDINGS: Since prior evaluation a pigtail catheter has been introduced the distal aspect overlying the right lower thoracic region. There is significant interval decrease in right pleural effusion. Lucency within the inferior lateral aspect of the right thorax is most suggestive for small volume of right pleural air. Opacity within the right lung parenchyma likely related to atelectasis; underlying infiltrate/consolidation cannot be excluded. There is stable interstitial prominence noted on the left. Small left pleural effusion is likely present. No left pleural air is identified. Stent graft material is identified overlying the lower chest and upper abdominal midline. No significant mediastinal shift is noted.  IMPRESSION: Since prior evaluation a pigtail catheter has been introduced the distal aspect overlying the right lower thoracic region. There is significant interval decrease in right pleural effusion. Lucency within the inferior lateral aspect of the right thorax is most suggestive for small volume of right pleural air.      ANGELICA VASQUEZ MD; Attending Radiologist    < end of copied text >      ECHO:      < from: TTE Echo Complete w/o Contrast w/ Doppler (10.25.20 @ 09:11) >    Summary:   1. Mildly to moderately decreased global left ventricular systolic function.   2. Spectral Doppler shows impaired relaxation pattern of left ventricular myocardial filling (Grade I diastolic dysfunction).   3. There is moderate concentric left ventricular hypertrophy.   4. Dyskinetic apex.   5. Mildly increased RV wall thickness.   6. Possible mobile opacification in left atrium.   7. Mildly enlarged right atrium.   8. Moderate pleural effusion in both left and right lateral regions.   9. Mild thickening and calcification of the anterior and posterior mitral valve leaflets.  10. Mild tricuspid regurgitation.  11. Sclerotic aortic valve with decreased opening.  12. Dilatation of the aortic root.    [id number] Anh Wilson MD, [facility] , Electronically signed on 10/25/2020 at 12:56:22 PM        *** Final ***        ANH WILSON   This document has been electronically signed. Oct 25 2020  9:11AM    < end of copied text >

## 2020-10-28 NOTE — PROGRESS NOTE ADULT - ASSESSMENT
Physical Examination:  GENERAL:               Alert, Oriented, No acute distress.    HEENT:                    Pupils equal, reactive to light.  Symmetric. No JVD, Moist MM  PULM:                     Bilateral air entry, chest tube to the right, draining  CVS:                         S1, S2,  No Murmur  ABD:                        Soft, nondistended, nontender, normoactive bowel sounds,   EXT:                         No edema, nontender, No Cyanosis or Clubbing   Vascular:                Warm Extremities, Normal Capillary refill, Normal Distal Pulses  SKIN:                       Warm and well perfused, no rashes noted.   NEURO:                  Alert, oriented, interactive, follows commands  PSYC:                      Calm, + Insight.      Assessment:  1. Acute respiratory failure  2. Bilateral pleural effusion  3. COPD  4. Right upper lobe collapse   5. Coronary artery disease   6. Hypertension   7. Hypothyroidism  8. Bacteremia - enterococcus faecalis     Plan  - Echo showing moderate LV dysfunction though no EF reported, also noted with left atrial mobile mass  - Cardiology consult appreciated, may need BRANDEN, though patient is high risk for any intervention given extensive comorbid conditions and may need to be intubated for any procedures.   - Pleural fluid is suggestive of transudative in nature  - Cont. chest tube and monitor out put   - Obtain CT scan of the chest/Abdomen/Pelvis to evaluate graft patency and r/o collections   - Cont. diuresis with albumin for hypoalbuminemia  - cont. symbicort and nebs  - Anemia, but no obvious bleeding noted. Will transfuse 1 unit PRBC and monitor  - Supplement electrolytes  - Antibiotics per ID, repeat cultures remain positive from yesterday  - Oxygen support to maintain saturation > 90%, HFNC  - Cont. thyroid medications and BPH medications  - Daughter Kaur Ramirez ([phone]) - updated about clinical condition 10/27  - Prognosis is guarded, given recurrent hospitalizations

## 2020-10-28 NOTE — PROGRESS NOTE ADULT - SUBJECTIVE AND OBJECTIVE BOX
`SUBJ:  Patient is a 80y old  Male who presents with a chief complaint of SOB/Hypoxia/Right lung white out/mucous plug (28 Oct 2020 09:40)  patient seen and examined  case discussed with Dr. Dsouza  chart is reviewed  patient is lethargic in bed      PAST MEDICAL & SURGICAL HISTORY:  Anemia    Cardiac arrhythmia    Anxiety    COPD (chronic obstructive pulmonary disease)    Aortic aneurysm    Heart disease  sp cardiac cath with stent placement    BPH (benign prostatic hyperplasia)    Hypertension    S/P coronary artery stent placement  1 stent- blocked artery        MEDICATIONS  (STANDING):  amLODIPine   Tablet 10 milliGRAM(s) Oral daily  ampicillin  IVPB 2 Gram(s) IV Intermittent every 6 hours  budesonide 160 MICROgram(s)/formoterol 4.5 MICROgram(s) Inhaler 2 Puff(s) Inhalation two times a day  cefTRIAXone   IVPB 2000 milliGRAM(s) IV Intermittent every 12 hours  chlorhexidine 2% Cloths 1 Application(s) Topical <User Schedule>  enoxaparin Injectable 40 milliGRAM(s) SubCutaneous daily  furosemide   Injectable 40 milliGRAM(s) IV Push every 12 hours  influenza   Vaccine 0.5 milliLiter(s) IntraMuscular once  levothyroxine 100 MICROGram(s) Oral daily  lidocaine   Patch 1 Patch Transdermal every 24 hours  pantoprazole    Tablet 40 milliGRAM(s) Oral before breakfast  tamsulosin 0.4 milliGRAM(s) Oral two times a day    MEDICATIONS  (PRN):  acetaminophen   Tablet .. 650 milliGRAM(s) Oral every 6 hours PRN Temp greater or equal to 38C (100.4F), Mild Pain (1 - 3)  albuterol/ipratropium for Nebulization 3 milliLiter(s) Nebulizer every 6 hours PRN Shortness of Breath and/or Wheezing          Vital Signs Last 24 Hrs  T(C): 36.4 (28 Oct 2020 12:00), Max: 36.8 (27 Oct 2020 17:00)  T(F): 97.5 (28 Oct 2020 12:00), Max: 98.2 (27 Oct 2020 17:00)  HR: 78 (28 Oct 2020 12:00) (72 - 84)  BP: 123/69 (28 Oct 2020 12:00) (100/73 - 153/136)  BP(mean): 87 (28 Oct 2020 12:00) (82 - 143)  RR: 13 (28 Oct 2020 12:00) (12 - 24)  SpO2: 99% (28 Oct 2020 12:00) (92% - 100%)    REVIEW OF SYSTEMS:  CONSTITUTIONAL: fatigue  RESPIRATORY: No  wheezing, chills or hemoptysis;  CARDIOVASCULAR: No chest pain or chest pressure.   No palpitations, dizziness, light headedness, syncope or near syncope.  No edema, no orthopnea.   NEUROLOGICAL: No headaches, memory loss, loss of strength, numbness, or tremors      PHYSICAL EXAM  Constitutional:  WDWN. No acute distress  HEENT: normocephalic, atraumatic.  PERRLA. EOMI  Neck : No JVD. no carotid bruits  Lungs:  decreased breath sounds bilaterally   Heart:  S1 and S2. No S3, S4. I/VI systolic murmur.  Abdomen:  soft, non tender.  Extremities: No clubbing, cyanoisis or edema  Nuerologic:  A+O x 3. No focal deficits  Skin:  no rashes        LABS:                        8.2    9.85  )-----------( 303      ( 28 Oct 2020 06:00 )             25.4     10-28    138  |  101  |  21  ----------------------------<  111<H>  3.1<L>   |  28  |  1.20    Ca    9.0      28 Oct 2020 06:00  Phos  2.1     10-28  Mg     2.4     10-28    TPro  6.6  /  Alb  2.9<L>  /  TBili  0.3  /  DBili  x   /  AST  11  /  ALT  15  /  AlkPhos  103  10-27              I&O's Summary    27 Oct 2020 07:01  -  28 Oct 2020 07:00  --------------------------------------------------------  IN: 2480 mL / OUT: 4295 mL / NET: -1815 mL    28 Oct 2020 07:01  -  28 Oct 2020 13:12  --------------------------------------------------------  IN: 590 mL / OUT: 0 mL / NET: 590 mL    < from: 12 Lead ECG (10.24.20 @ 20:32) >  Normal sinus rhythm with premature atrial contractions.  Low voltage QRS  Abnormal ECG    < end of copied text >    `< from: TTE Echo Complete w/o Contrast w/ Doppler (10.25.20 @ 09:11) >   1. Mildly to moderately decreased global left ventricular systolic function.   2. Spectral Doppler shows impaired relaxation pattern of left ventricular myocardial filling (Grade I diastolic dysfunction).   3. There is moderate concentric left ventricular hypertrophy.   4. Dyskinetic apex.   5. Mildly increased RV wall thickness.   6. Possible mobile opacification in left atrium.   7. Mildly enlarged right atrium.   8. Moderate pleural effusion in both left andright lateral regions.   9. Mild thickening and calcification of the anterior and posterior mitral valve leaflets.  10. Mild tricuspid regurgitation.  11. Sclerotic aortic valve with decreased opening.  12. Dilatation of the aortic root.    < end of copied text >    `< from: CT Chest No Cont (10.24.20 @ 23:17) >  1. Large right pleural effusion with compressive atelectasis of the entire right lung and moderate to large left pleural effusion with partial compressive atelectasis of the left lower lobe which has intervally worsened.  2. Interstitial opacities in the left upper lobe which could be due to atelectasis versus an infectious etiology.  3. Debris nearly completely opacifying the right mainstem bronchus.    < end of copied text >    `

## 2020-10-28 NOTE — PROGRESS NOTE ADULT - SUBJECTIVE AND OBJECTIVE BOX
CC: f/u for enterococcal bacteremia    Patient reports: he is surprised by infection, "how could I have developed this"    REVIEW OF SYSTEMS:  All other review of systems negative (Comprehensive ROS): he is s/p rt chest pigtail, 2 liters serous fluid drained when tube placed.    Antimicrobials Day #  :day 3 enterococcal coverage, s/p 2 days of vanco, 3 days of CTX and azithro  ampicillin  IVPB 2 Gram(s) IV Intermittent every 6 hours  cefTRIAXone   IVPB 2000 milliGRAM(s) IV Intermittent every 12 hours    Other Medications Reviewed  MEDICATIONS  (STANDING):  albuterol/ipratropium for Nebulization 3 milliLiter(s) Nebulizer every 4 hours  amLODIPine   Tablet 10 milliGRAM(s) Oral daily  ampicillin  IVPB 2 Gram(s) IV Intermittent every 6 hours  budesonide 160 MICROgram(s)/formoterol 4.5 MICROgram(s) Inhaler 2 Puff(s) Inhalation two times a day  cefTRIAXone   IVPB 2000 milliGRAM(s) IV Intermittent every 12 hours  chlorhexidine 2% Cloths 1 Application(s) Topical <User Schedule>  enoxaparin Injectable 40 milliGRAM(s) SubCutaneous daily  furosemide   Injectable 40 milliGRAM(s) IV Push every 12 hours  influenza   Vaccine 0.5 milliLiter(s) IntraMuscular once  levothyroxine 100 MICROGram(s) Oral daily  lidocaine   Patch 1 Patch Transdermal every 24 hours  pantoprazole    Tablet 40 milliGRAM(s) Oral before breakfast  potassium chloride   Solution 40 milliEquivalent(s) Oral once  tamsulosin 0.4 milliGRAM(s) Oral two times a day    T(F): 97.7 (10-28-20 @ 08:00), Max: 98.2 (10-27-20 @ 17:00)  HR: 83 (10-28-20 @ 08:25)  BP: 126/79 (10-28-20 @ 08:00)  RR: 16 (10-28-20 @ 08:25)  SpO2: 92% (10-28-20 @ 08:25)  Wt(kg): --    PHYSICAL EXAM:  General: alert, no acute distress, chronically ill appearing  Eyes:  anicteric, no conjunctival injection, no discharge  Oropharynx: no lesions or injection 	  Neck: supple, without adenopathy  Lungs: clear to auscultation, distant BS  Heart: irregular rate and rhythm; no murmur,  Abdomen: soft, nondistended, nontender, without mass or organomegaly  Skin: no lesions  Extremities: no clubbing, cyanosis, or edema  Neurologic: alert, oriented, moves all extremities    LAB RESULTS:                        8.2    9.85  )-----------( 303      ( 28 Oct 2020 06:00 )             25.4     10-28    138  |  101  |  21  ----------------------------<  111<H>  3.1<L>   |  28  |  1.20    Ca    9.0      28 Oct 2020 06:00  Phos  2.1     10-28  Mg     2.4     10-28    TPro  6.6  /  Alb  2.9<L>  /  TBili  0.3  /  DBili  x   /  AST  11  /  ALT  15  /  AlkPhos  103  10-27    LIVER FUNCTIONS - ( 27 Oct 2020 05:20 )  Alb: 2.9 g/dL / Pro: 6.6 g/dL / ALK PHOS: 103 U/L / ALT: 15 U/L / AST: 11 U/L / GGT: x             MICROBIOLOGY:  RECENT CULTURES:  10-27 @ 12:15 Pleural Fl Pleural Fluid     Testing in progress    polymorphonuclear leukocytes seen  No organisms seen  by cytocentrifuge    10-27 @ 05:20 .Blood Blood-Peripheral     Growth in anaerobic bottle: Gram Positive Cocci in Pairs and Chains  Growth in aerobic bottle: Gram Positive Cocci in Pairs and Chains    Growth in anaerobic bottle: Gram Positive Cocci in Pairs and Chains  Growth in aerobic bottle: Gram Positive Cocci in Pairs and Chains    10-24 @ 22:15 .Urine Clean Catch (Midstream)     <10,000 CFU/mL Normal Urogenital Tari      10-24 @ 20:28 .Blood Blood-Peripheral Blood Culture PCR  Enterococcus faecalis    Growth in aerobic and anaerobic bottles: Enterococcus faecalis        Growth in anaerobic bottle: Gram Positive Cocci in Pairs and Chains  Growth in aerobic bottle: Gram Positive Cocci in Pairs and Chains        RADIOLOGY REVIEWED:    < from: Xray Chest 1 View- PORTABLE-Urgent (Xray Chest 1 View- PORTABLE-Urgent .) (10.27.20 @ 12:47) >  IMPRESSION: Since prior evaluation a pigtail catheter has been introduced the distal aspect overlying the right lower thoracic region. There is significant interval decrease in right pleural effusion. Lucency within the inferior lateral aspect of the right thorax is most suggestive for small volume of right pleural air.      < end of copied text >  rad

## 2020-10-28 NOTE — PROGRESS NOTE ADULT - ASSESSMENT
80y male with a PMH of HTN, COPD, CAD, hypothyroidism, ascending aneurysm, BPH, s/p stent graft to repair AAA in late August /early September at [facility]   His Chest CT scan in September showed B/L effusions, RT>Lt, RUL atelectasis, endobronchial debris, and aortic graft stent with endoleak. S/p thoracentesis with fluid transudative. Was treated with Zosyn    Now admitted on 10/24 with c/o SOB & started CTX and azithro at [facility]  He has multiple reasons for shortness of breath-effusions, atelectasis, and infiltrates.  Vancomycin added after blood cx recovered Enterococcus which is not a respiratory pathogen.  The organism is not growing in the urine, therefore raises concerns of endocarditis as well as graft infection. With his recent ? GI bleed, I am concerned about status of aneurysm and graft.  He has had a sustained bacteremia-2 sets of 10/24 blood cultures with enterococcus and 2 sets of blood cultures sent on 10/27 with enterococcus  Sustained bacteremia suggestive of endovascular focus  TTE without evidence of endocarditis.  Suggest:  1.Will switch to Amp/CTX today to extend enterococcal therapy  2. Follow repeat blood cultures to assess status of bacteremia  3. BRANDEN would normally be advised, although I agree with cardiology that unless approach is altered by findings it may be deferred  4.Consider vascular input, ? if CT with contrast to assess graft should be done to look for signs of graft infection  5.Patient informed of serious nature of his infection.  6.pigtail per pulmonary

## 2020-10-28 NOTE — PROGRESS NOTE ADULT - ASSESSMENT
80 year old man admitted with respiratory failure due to probable pneumonia.  He has large pleural effusion, mucous plug and his required high levels of oxygen.  s/p chest tube  Sustained bacteremia.  s/p stent graft repair of AAA.  Concern for possible graft infection or endocarditis.    Plan  - discussed with ICU team... he is high risk for complications from BRANDEN and would likely need to  be intubated to protect airway if this is pursued   - consider CT chest with contrast to evaluate graft  - antibiotics as per ID  - continue diuresis  - chest tube as per ICU    discussed with ICU team

## 2020-10-28 NOTE — PROGRESS NOTE ADULT - SUBJECTIVE AND OBJECTIVE BOX
Follow-up Critical Care Progress Note  Chief Complaint : Hypoxemia    Awake and alert. Not in distress. HFNC being titrated down. Denies any pain at this time.     Allergies :No Known Allergies      PAST MEDICAL & SURGICAL HISTORY:  Anemia    Cardiac arrhythmia    Anxiety    COPD (chronic obstructive pulmonary disease)    Aortic aneurysm    Heart disease  sp cardiac cath with stent placement    BPH (benign prostatic hyperplasia)    Hypertension    S/P coronary artery stent placement  1 stent- blocked artery        Medications:  MEDICATIONS  (STANDING):  albuterol/ipratropium for Nebulization 3 milliLiter(s) Nebulizer every 4 hours  amLODIPine   Tablet 10 milliGRAM(s) Oral daily  ampicillin  IVPB 2 Gram(s) IV Intermittent every 6 hours  budesonide 160 MICROgram(s)/formoterol 4.5 MICROgram(s) Inhaler 2 Puff(s) Inhalation two times a day  cefTRIAXone   IVPB 2000 milliGRAM(s) IV Intermittent every 12 hours  chlorhexidine 2% Cloths 1 Application(s) Topical <User Schedule>  enoxaparin Injectable 40 milliGRAM(s) SubCutaneous daily  furosemide   Injectable 40 milliGRAM(s) IV Push every 12 hours  influenza   Vaccine 0.5 milliLiter(s) IntraMuscular once  levothyroxine 100 MICROGram(s) Oral daily  lidocaine   Patch 1 Patch Transdermal every 24 hours  pantoprazole    Tablet 40 milliGRAM(s) Oral before breakfast  potassium chloride   Solution 40 milliEquivalent(s) Oral once  tamsulosin 0.4 milliGRAM(s) Oral two times a day    MEDICATIONS  (PRN):  acetaminophen   Tablet .. 650 milliGRAM(s) Oral every 6 hours PRN Temp greater or equal to 38C (100.4F), Mild Pain (1 - 3)      LABS:                        8.2    9.85  )-----------( 303      ( 28 Oct 2020 06:00 )             25.4     10-28    138  |  101  |  21  ----------------------------<  111<H>  3.1<L>   |  28  |  1.20    Ca    9.0      28 Oct 2020 06:00  Phos  2.1     10-28  Mg     2.4     10-28    TPro  6.6  /  Alb  2.9<L>  /  TBili  0.3  /  DBili  x   /  AST  11  /  ALT  15  /  AlkPhos  103  10-27    Fluid characteristics  -- 10-27 @ 12:15  pH 7.8  LDH 75  tprot 2.6    Cell count  Appearance Slightly Cloudy  Fluid type --  BF lymph 15  color Yellow  eosinophil --  PMN 20  Mesothelial --  Monocyte 65  Other body cells --  CULTURES: (if applicable)    Culture - Fungal, Body Fluid (collected 10-27-20 @ 12:15)  Source: Pleural Fl Pleural Fluid  Preliminary Report (10-28-20 @ 08:13):    Testing in progress    Culture - Body Fluid with Gram Stain (collected 10-27-20 @ 12:15)  Source: Pleural Fl Pleural Fluid  Gram Stain (10-27-20 @ 23:20):    polymorphonuclear leukocytes seen    No organisms seen    by cytocentrifuge    Culture - Blood (collected 10-27-20 @ 05:20)  Source: .Blood Blood-Venous  Gram Stain (10-28-20 @ 02:51):    Growth in aerobic bottle: Gram Positive Cocci in Pairs and Chains    Growth in anaerobic bottle: Gram Positive Cocci in Pairs and Chains  Preliminary Report (10-28-20 @ 02:52):    Growth in aerobic bottle: Gram Positive Cocci in Pairs and Chains    Growth in anaerobic bottle: Gram Positive Cocci in Pairs and Chains    Culture - Blood (collected 10-27-20 @ 05:20)  Source: .Blood Blood-Peripheral  Gram Stain (10-28-20 @ 03:37):    Growth in anaerobic bottle: Gram Positive Cocci in Pairs and Chains    Growth in aerobic bottle: Gram Positive Cocci in Pairs and Chains  Preliminary Report (10-28-20 @ 03:37):    Growth in anaerobic bottle: Gram Positive Cocci in Pairs and Chains    Growth in aerobic bottle: Gram Positive Cocci in Pairs and Chains    Culture - Urine (collected 10-24-20 @ 22:15)  Source: .Urine Clean Catch (Midstream)  Final Report (10-26-20 @ 07:55):    <10,000 CFU/mL Normal Urogenital Tari    Culture - Blood (collected 10-24-20 @ 20:28)  Source: .Blood Blood-Peripheral  Gram Stain (10-25-20 @ 16:04):    Growth in aerobic and anaerobic bottles: Gram Positive Cocci in Pairs and    Chains  Final Report (10-27-20 @ 14:11):    Growth in aerobic and anaerobic bottles: Enterococcus faecalis    See previous culture [id number]    Culture - Blood (collected 10-24-20 @ 20:28)  Source: .Blood Blood-Peripheral  Gram Stain (10-25-20 @ 16:01):    Growth in anaerobic bottle: Gram Positive Cocci in Pairs and Chains    Growth in aerobic bottle: Gram Positive Cocci in Pairs and Chains  Final Report (10-27-20 @ 14:11):    Growth in aerobic and anaerobic bottles: Enterococcus faecalis    "Due to technical problems, Proteus sp. will Not be reported as part of    the BCID panel until further notice" ***Blood Panel PCR results on this    specimen are available    approximately 3 hours after the Gram stain result.***    Gram stain, PCR, and/or culture results may not always    correspond due to difference in methodologies.    ************************************************************    This PCR assay was performed using [employer].    The following targets are tested for: Enterococcus,    vancomycin resistant enterococci, Listeria monocytogenes,    coagulase negative staphylococci, S. aureus,    methicillin resistant S. aureus, Streptococcus agalactiae    (Group B), S. pneumoniae, S. pyogenes (Group A),    Acinetobacter baumannii, Enterobacter cloacae, E. coli,    Klebsiella oxytoca, K. pneumoniae, Proteus sp.,    Serratia marcescens, Haemophilus influenzae,    Neisseria meningitidis, Pseudomonas aeruginosa, Candida    albicans, C. glabrata, C krusei, C parapsilosis,    C. tropicalis and the KPC resistance gene.  Organism: Blood Culture PCR  Enterococcus faecalis (10-27-20 @ 14:11)  Organism: Enterococcus faecalis (10-27-20 @ 14:11)      -  Ampicillin: S <=2 Predicts results to ampicillin/sulbactam, amoxacillin-clavulanate and  piperacillin-tazobactam.      -  Gentamicin synergy: R      -  Vancomycin: S 2      Method Type: CLOTEN  Organism: Blood Culture PCR (10-27-20 @ 14:11)      -  Enterococcus species: Detec      Method Type: PCR    VITALS:  T(C): 36.5 (10-28-20 @ 08:00), Max: 36.8 (10-27-20 @ 17:00)  T(F): 97.7 (10-28-20 @ 08:00), Max: 98.2 (10-27-20 @ 17:00)  HR: 83 (10-28-20 @ 08:25) (72 - 83)  BP: 126/79 (10-28-20 @ 08:00) (100/73 - 153/136)  BP(mean): 94 (10-28-20 @ 08:00) (82 - 143)  ABP: --  ABP(mean): --  RR: 16 (10-28-20 @ 08:25) (12 - 24)  SpO2: 92% (10-28-20 @ 08:25) (92% - 100%)  CVP(mm Hg): --  CVP(cm H2O): --    Ins and Outs     10-27-20 @ 07:01  -  10-28-20 @ 07:00  --------------------------------------------------------  IN: 2480 mL / OUT: 4295 mL / NET: -1815 mL    10-28-20 @ 07:01  -  10-28-20 @ 09:40  --------------------------------------------------------  IN: 250 mL / OUT: 0 mL / NET: 250 mL    I&O's Detail    27 Oct 2020 07:01  -  28 Oct 2020 07:00  --------------------------------------------------------  IN:    Albumin 25%  -  50 mL: 300 mL    IV PiggyBack: 50 mL    IV PiggyBack: 300 mL    IV PiggyBack: 250 mL    IV PiggyBack: 200 mL    IV PiggyBack: 250 mL    Oral Fluid: 780 mL    PRBCs (Packed Red Blood Cells): 350 mL  Total IN: 2480 mL    OUT:    Chest Tube (mL): 2660 mL    Indwelling Catheter - Urethral (mL): 1635 mL  Total OUT: 4295 mL    Total NET: -1815 mL      28 Oct 2020 07:01  -  28 Oct 2020 09:40  --------------------------------------------------------  IN:    IV PiggyBack: 250 mL  Total IN: 250 mL    OUT:  Total OUT: 0 mL    Total NET: 250 mL

## 2020-10-29 NOTE — PROVIDER CONTACT NOTE (EICU) - BACKGROUND
80M w/ HTN, CAD, COPD, AAA s/p grafting, anemia. Presents w/ SOB. Hypoxic upon arrival, placed on NRB w/ improvement in sats. Received IVF, abx in the ED. CXR showed almost complete whiteout of right hemithorax and appeared that trachea was pulled to ipsilateral side. CT chest showed debris in RMB and atelectasis w/ pleural effusions.
80 year old chronically ill male here with respiratory failure, bacteremia, possible endocarditis.

## 2020-10-29 NOTE — CHART NOTE - NSCHARTNOTEFT_GEN_A_CORE
NUTRITION FOLLOW UP    SOURCE: Patient [)   Family [ ]     other [X ] Medical record    DIET:  DASH w/ magic cup bid    PATIENT REPORT[ ] nausea  [ ] vomiting [ ] diarrhea [ ] constipation  [ ]chewing problems [ ] swallowing issues  [ ] other: no GI distress    PO INTAKE:  < 50% [ ]   50-75%  [ ]   %  [X]  other :    SOURCE: for PO intake [] Patient [ ] family [ X] chart [ ] staff [ ] other    ENTERAL/PARENTERAL NUTRITION: n/a    CURRENT WEIGHT: Weight (kg): 62.6 (10-25 @ 00:30)    10/29 59.5 KG.    PERTINENT LABS:  Date: 29 Oct 2020 06:15  Hemoglobin 8.8    Hematocrit 28.3     Date: 10-29  Sodium 142  Potassium 3.4<L>  Glucose Serum 85  BUN 18      Creatinine    ACCUCHECK      SKIN: Ecchymotic, last bm was 10/29, no edema noted, severe malnutrition noted    ESTIMATED NEEDS:   [X] no change since previous assessment  [ ] recalculated:     PREVIOUS NUTRITION DIAGNOSIS: addressed    NUTRITION DIAGNOSIS is   [X] ongoing    NEW NUTRITION DIAGNOSIS: [X] not applicable    MONITORING AND EVALUATION:   Current diet order is appropriate and is well tolerated, but will monitor for any changes that may be needed    [X] PO intake [X] Tolerance to diet prescription [X] weights [X] follow up per protocol    NUTRITION RECOMMENDATIONS:    JENNIFER remains available SOHEILA Muniz RD, CDE

## 2020-10-29 NOTE — PROGRESS NOTE ADULT - ASSESSMENT
Physical Examination:  GENERAL:               Alert, Oriented, No acute distress.    HEENT:                    Pupils equal, reactive to light.  Symmetric. No JVD, Moist MM  PULM:                     Bilateral air entry, chest tube to the right  CVS:                         S1, S2,  No Murmur  ABD:                        Soft, nondistended, nontender, normoactive bowel sounds,   EXT:                         No edema, nontender, No Cyanosis or Clubbing   Vascular:                Warm Extremities, Normal Capillary refill, Normal Distal Pulses  SKIN:                       Warm and well perfused, no rashes noted.   NEURO:                  Alert, oriented, interactive, follows commands  PSYC:                      Calm, + Insight.      Assessment:  1. Acute respiratory failure  2. Bilateral pleural effusion  3. COPD  4. Right upper lobe collapse   5. Coronary artery disease   6. Hypertension   7. Hypothyroidism  8. Bacteremia - enterococcus faecalis     Plan  - Echo showing moderate LV dysfunction though no EF reported, also noted with left atrial mobile mass  - Cardiology consult appreciated, may need BRANDEN, though patient is high risk for any intervention given extensive comorbid conditions and may need to be intubated for any procedures. Discussed with daughter about mechanical ventilation. Suggested she would not want him to be intubated for the procedure.   - Pleural fluid is suggestive of transudative in nature  - Place chest tube to water seal today  - Monitor respiratory status   - CT scan of the chest/Abdomen/Pelvis to evaluate graft patency and r/o collections - reported with PNA, no perigraft collection, no air around the stent.   - Cont. diuresis with albumin for hypoalbuminemia  - Supplement potassium  - cont. symbicort and nebs  - Supplement electrolytes  - Antibiotics per ID, repeat cultures remain positive from 10/27, Will discuss with ID about treating for possible endorcarditis with long term antibiotics.   - Oxygen support to maintain saturation > 90%, on 3LNC  - Cont. thyroid medications and BPH medications  - Daughter Kaur Ramirez ([phone]) - updated about clinical condition 10/29  - Prognosis is guarded, given recurrent hospitalizations  - Add enlive as patient has poor oral intake

## 2020-10-29 NOTE — PROVIDER CONTACT NOTE (EICU) - RECOMMENDATIONS
80M w/ hypoxic respiratory failure and mucous plugging.  - would avoid BiPAP given CT findings, continue w/ HFNC, titrate to O2 sat >90%  - would treat for COPD exacerbation and pneumonia and aggressive pulmonary toilet  - duonebs q4, hypersal 3% q8, chest PT or metinebs  - solumedrol q12, ceftriaxone, azithromycin  - send sputum cx   - NPO for now    Admit to ICU. Plan discussed w/ ICU EMMETT sibley
Wean down HFNC to maintain SpO2 88 - 95%

## 2020-10-29 NOTE — PROGRESS NOTE ADULT - SUBJECTIVE AND OBJECTIVE BOX
Follow up for  SUBJ:    lethargic at times but responsive asks "what is my prognosis    PMH  Anemia    Cardiac arrhythmia    Anxiety    COPD (chronic obstructive pulmonary disease)    Aortic aneurysm    Heart disease    BPH (benign prostatic hyperplasia)    Hypertension        MEDICATIONS  (STANDING):  aMIOdarone    Tablet 200 milliGRAM(s) Oral daily  amLODIPine   Tablet 10 milliGRAM(s) Oral daily  ampicillin  IVPB 2 Gram(s) IV Intermittent every 6 hours  bisacodyl Suppository 10 milliGRAM(s) Rectal daily  budesonide 160 MICROgram(s)/formoterol 4.5 MICROgram(s) Inhaler 2 Puff(s) Inhalation two times a day  cefTRIAXone   IVPB 2000 milliGRAM(s) IV Intermittent every 12 hours  chlorhexidine 2% Cloths 1 Application(s) Topical <User Schedule>  enoxaparin Injectable 40 milliGRAM(s) SubCutaneous daily  furosemide   Injectable 40 milliGRAM(s) IV Push every 12 hours  influenza   Vaccine 0.5 milliLiter(s) IntraMuscular once  levothyroxine 100 MICROGram(s) Oral daily  lidocaine   Patch 1 Patch Transdermal every 24 hours  pantoprazole    Tablet 40 milliGRAM(s) Oral before breakfast  senna 2 Tablet(s) Oral at bedtime  tamsulosin 0.4 milliGRAM(s) Oral two times a day    MEDICATIONS  (PRN):  acetaminophen   Tablet .. 650 milliGRAM(s) Oral every 6 hours PRN Temp greater or equal to 38C (100.4F), Mild Pain (1 - 3)  albuterol/ipratropium for Nebulization 3 milliLiter(s) Nebulizer every 6 hours PRN Shortness of Breath and/or Wheezing  ALPRAZolam 0.25 milliGRAM(s) Oral at bedtime PRN insomnia        PHYSICAL EXAM:  Vital Signs Last 24 Hrs  T(C): 36.7 (29 Oct 2020 12:00), Max: 36.7 (29 Oct 2020 12:00)  T(F): 98 (29 Oct 2020 12:00), Max: 98 (29 Oct 2020 12:00)  HR: 81 (29 Oct 2020 12:00) (80 - 88)  BP: 116/76 (29 Oct 2020 12:00) (111/74 - 139/72)  BP(mean): 87 (29 Oct 2020 12:00) (84 - 101)  RR: 15 (29 Oct 2020 12:00) (12 - 20)  SpO2: 96% (29 Oct 2020 12:00) (87% - 100%)    GENERAL: thin cacheticti acutely and chroncially ill appearing  HEAD:  Atraumatic, Normocephalic  EYES: EOMI, PERRLA, conjunctiva and sclera clear  ENT: Moist mucous membranes,  NECK: Supple, No JVD, no bruits  CHEST/LUNG: Clear to percussion bilaterally; No rales, rhonchi, wheezing, or rubs  HEART: Regular rate and rhythm; No murmurs, rubs, or gallops PMI non displaced.  ABDOMEN: Soft, Nontender, Nondistended; Bowel sounds present  EXTREMITIES:  2+ Peripheral Pulses, No clubbing, cyanosis, or edema  SKIN: No rashes or lesions  NERVOUS SYSTEM:  Cranial Nerves II-XII intact      TELEMETRY:    ECG:    < from: 12 Lead ECG (10.24.20 @ 20:32) >  Diagnosis Line Normal sinus rhythm with premature atrial contractions.  Low voltage QRS  Abnormal ECG  Confirmed by DAVID ZHU, ANH ARCE ([id number]) on 10/25/2020 8:42:52 AM    < end of copied text >    ECHO:    cho< from: TTE Echo Complete w/o Contrast w/ Doppler (10.25.20 @ 09:11) >  Summary:   1. Mildly to moderately decreased global left ventricular systolic function.   2. Spectral Doppler shows impaired relaxation pattern of left ventricular myocardial filling (Grade I diastolic dysfunction).   3. There is moderate concentric left ventricular hypertrophy.   4. Dyskinetic apex.   5. Mildly increased RV wall thickness.   6. Possible mobile opacification in left atrium.   7. Mildly enlarged right atrium.   8. Moderate pleural effusion in both left andright lateral regions.   9. Mild thickening and calcification of the anterior and posterior mitral valve leaflets.  10. Mild tricuspid regurgitation.  11. Sclerotic aortic valve with decreased opening.  12. Dilatation of the aortic root.    [id number] Anh Wilson MD, [facility] , Electronically signed on 10/25/2020 at 12:56:22 PM      *** Final ***        ANH WILSON   This document has been electronically signed. Oct 25 2020  9:11AM    < end of copied text >      LABS:                        8.8    9.58  )-----------( 293      ( 29 Oct 2020 06:15 )             28.3     10-29    142  |  104  |  18  ----------------------------<  85  3.4<L>   |  31  |  1.10    Ca    9.1      29 Oct 2020 06:15  Phos  2.8     10-29  Mg     2.3     10-29    TPro  6.2  /  Alb  2.9<L>  /  TBili  0.4  /  DBili  x   /  AST  11  /  ALT  18  /  AlkPhos  94  10-29      I&O's Summary    28 Oct 2020 07:01  -  29 Oct 2020 07:00  --------------------------------------------------------  IN: 1230 mL / OUT: 2490 mL / NET: -1260 mL    29 Oct 2020 07:01  -  29 Oct 2020 15:53  --------------------------------------------------------  IN: 400 mL / OUT: 300 mL / NET: 100 mL      BNP    RADIOLOGY & ADDITIONAL STUDIES:    ra< from: Xray Chest 1 View- PORTABLE-Routine (Xray Chest 1 View- PORTABLE-Routine .) (10.29.20 @ 10:01) >  IMPRESSION:    Right pigtail thoracostomy tube. Small right apical pneumothorax.    Bibasilar infiltrates and effusions unchanged.    Cardiomegaly. Descending thoracic aortic stent graft.      DARINEL SHAH MD; Attending Radiologist  This document has been electronically signed. Oct 29 2020 12:58PM    < end of copied text >      ECHO:

## 2020-10-29 NOTE — PROGRESS NOTE ADULT - SUBJECTIVE AND OBJECTIVE BOX
CC: f/u for enterococcal bacteremia    Patient reports that his back hurts & breathing is not comfortable. Asking for coffee.     REVIEW OF SYSTEMS:  All other review of systems negative (Comprehensive ROS) except as above     Antimicrobials Day #  : 4  ampicillin  IVPB 2 Gram(s) IV Intermittent every 6 hours  cefTRIAXone   IVPB 2000 milliGRAM(s) IV Intermittent every 12 hours    Other Medications Reviewed    T(F): 98 (10-29-20 @ 12:00), Max: 98 (10-29-20 @ 12:00)  HR: 81 (10-29-20 @ 12:00)  BP: 116/76 (10-29-20 @ 12:00)ec  RR: 15 (10-29-20 @ 12:00)  SpO2: 96% (10-29-20 @ 12:00)  Wt(kg): --    PHYSICAL EXAM:  General: alert, no acute distress  Eyes:  anicteric, no conjunctival injection, no discharge  Neck: supple  Lungs: coarse breath sounds  Heart: regular rate and rhythm; no murmurs  Abdomen: soft, nondistended, nontender, bowel sounds +  : durand with clear urine  Extremities: no clubbing, cyanosis, or edema  Neurologic: alert, oriented, moves all extremities    LAB RESULTS:                        8.8    9.58  )-----------( 293      ( 29 Oct 2020 06:15 )             28.3     10-29    142  |  104  |  18  ----------------------------<  85  3.4<L>   |  31  |  1.10    Ca    9.1      29 Oct 2020 06:15  Phos  2.8     10-29  Mg     2.3     10-29    TPro  6.2  /  Alb  2.9<L>  /  TBili  0.4  /  DBili  x   /  AST  11  /  ALT  18  /  AlkPhos  94  10-29    LIVER FUNCTIONS - ( 29 Oct 2020 06:15 )  Alb: 2.9 g/dL / Pro: 6.2 g/dL / ALK PHOS: 94 U/L / ALT: 18 U/L / AST: 11 U/L / GGT: x             MICROBIOLOGY:  RECENT CULTURES:  10-27 @ 12:15 Pleural Fl Pleural Fluid     No growth    polymorphonuclear leukocytes seen  No organisms seen  by cytocentrifuge    10-27 @ 05:20 .Blood Blood-Peripheral     Growth in aerobic and anaerobic bottles: Enterococcus faecalis  See previous culture [id number]    Growth in anaerobic bottle: Gram Positive Cocci in Pairs and Chains  Growth in aerobic bottle: Gram Positive Cocci in Pairs and Chains    10-24 @ 22:15 .Urine Clean Catch (Midstream)     <10,000 CFU/mL Normal Urogenital Tari      10-24 @ 20:28 .Blood Blood-Peripheral Blood Culture PCR  Enterococcus faecalis    Growth in aerobic and anaerobic bottles: Enterococcus faecalis        RADIOLOGY REVIEWED:  < from: CT Angio Abdomen and Pelvis w/ IV Cont (10.28.20 @ 11:02) >  IMPRESSION:  Stable appearance of descending thoracoabdominal aortic stent graft. No evidence of perigraft collection or gas to suggest graft infection.    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 Stable excluded abdominal aortic aneurysm sac diameter measuring 5.1 cm.    Occluded right renal artery stent, unchanged.    Improving right basilar consolidation with persistent additional patchy opacities throughout both lungs. Findings are indicative of pneumonia.    Decreased size of moderate right hydropneumothorax with Pleurx catheter in place.    < end of copied text >

## 2020-10-29 NOTE — PROVIDER CONTACT NOTE (EICU) - ASSESSMENT
Telehealth evaluation precludes physical exam. Pt is breathing comfortably on HFNC. Remainder of exam as per ICU PA toñito.
Patient appears clinically unchanged but SpO2 decreased to mid-80s on 5L NC. Improved with HFNC. Switched sites to monitor SpO2 and it improved significantly.

## 2020-10-29 NOTE — PROGRESS NOTE ADULT - ASSESSMENT
80y male with a PMH of HTN, COPD, CAD, hypothyroidism, ascending aneurysm, BPH, s/p stent graft to repair AAA in late August /early September at [facility]   His Chest CT scan in September showed B/L effusions, RT>Lt, RUL atelectasis, endobronchial debris, and aortic graft stent with endoleak. S/p thoracentesis with fluid transudative. Was treated with Zosyn    Admitted on 10/24 with c/o SOB & started CTX and azithro at [facility]  He has multiple reasons for shortness of breath-effusions, atelectasis, and infiltrates.  Vancomycin added after blood cx recovered Enterococcus which is not a respiratory pathogen.  The organism is not growing in the urine, therefore raises concerns of endocarditis as well as graft infection. With his recent ? GI bleed, I am concerned about status of aneurysm and graft.  He has had a sustained bacteremia-2 sets of 10/24 blood cultures with enterococcus and 2 sets of blood cultures sent on 10/27 with enterococcus  Sustained bacteremia suggestive of endovascular focus  TTE without evidence of endocarditis.  CTA of abd & pelvis revealed stable appearance of descending thoracoabdominal aortic stent graft. No evidence of perigraft collection or gas to suggest graft infection. 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     Suggest:  1. Continue Amp/CTX for extended enterococcal therapy  2. Follow repeat blood cultures from today to assess status of bacteremia  3. BRANDEN would normally be advised, although I agree with cardiology that unless approach is altered by findings it may be deferred  4.Case reviewed with intensivist; as per his review with vascular; no support for graft infection on CTA & no intervention is planned, recommendation is that he must follow with his primary vascular surgeon as outpatient  5.Pigtail as per pulmonary

## 2020-10-29 NOTE — PROGRESS NOTE ADULT - SUBJECTIVE AND OBJECTIVE BOX
Follow-up Critical Care Progress Note  Chief Complaint : Hypoxemia    Remains comfortable on NC at this time. Chest tube with minimal fluid drainage.     Allergies :No Known Allergies      PAST MEDICAL & SURGICAL HISTORY:  Anemia    Cardiac arrhythmia    Anxiety    COPD (chronic obstructive pulmonary disease)    Aortic aneurysm    Heart disease  sp cardiac cath with stent placement    BPH (benign prostatic hyperplasia)    Hypertension    S/P coronary artery stent placement  1 stent- blocked artery        Medications:  MEDICATIONS  (STANDING):  aMIOdarone    Tablet 200 milliGRAM(s) Oral daily  amLODIPine   Tablet 10 milliGRAM(s) Oral daily  ampicillin  IVPB 2 Gram(s) IV Intermittent every 6 hours  bisacodyl Suppository 10 milliGRAM(s) Rectal daily  budesonide 160 MICROgram(s)/formoterol 4.5 MICROgram(s) Inhaler 2 Puff(s) Inhalation two times a day  cefTRIAXone   IVPB 2000 milliGRAM(s) IV Intermittent every 12 hours  chlorhexidine 2% Cloths 1 Application(s) Topical <User Schedule>  enoxaparin Injectable 40 milliGRAM(s) SubCutaneous daily  furosemide   Injectable 40 milliGRAM(s) IV Push every 12 hours  influenza   Vaccine 0.5 milliLiter(s) IntraMuscular once  levothyroxine 100 MICROGram(s) Oral daily  lidocaine   Patch 1 Patch Transdermal every 24 hours  pantoprazole    Tablet 40 milliGRAM(s) Oral before breakfast  senna 2 Tablet(s) Oral at bedtime  tamsulosin 0.4 milliGRAM(s) Oral two times a day    MEDICATIONS  (PRN):  acetaminophen   Tablet .. 650 milliGRAM(s) Oral every 6 hours PRN Temp greater or equal to 38C (100.4F), Mild Pain (1 - 3)  albuterol/ipratropium for Nebulization 3 milliLiter(s) Nebulizer every 6 hours PRN Shortness of Breath and/or Wheezing  ALPRAZolam 0.25 milliGRAM(s) Oral at bedtime PRN insomnia      LABS:                        8.8    9.58  )-----------( 293      ( 29 Oct 2020 06:15 )             28.3     10-29    142  |  104  |  18  ----------------------------<  85  3.4<L>   |  31  |  1.10    Ca    9.1      29 Oct 2020 06:15  Phos  2.8     10-29  Mg     2.3     10-29    TPro  6.2  /  Alb  2.9<L>  /  TBili  0.4  /  DBili  x   /  AST  11  /  ALT  18  /  AlkPhos  94  10-29    Fluid characteristics  -- 10-27 @ 12:15  pH 7.8  LDH 75  tprot 2.6    Cell count  Appearance Slightly Cloudy  Fluid type --  BF lymph 15  color Yellow  eosinophil --  PMN 20  Mesothelial --  Monocyte 65  Other body cells --    CULTURES: (if applicable)    Culture - Fungal, Body Fluid (collected 10-27-20 @ 12:15)  Source: Pleural Fl Pleural Fluid  Preliminary Report (10-28-20 @ 08:13):    Testing in progress    Culture - Body Fluid with Gram Stain (collected 10-27-20 @ 12:15)  Source: Pleural Fl Pleural Fluid  Gram Stain (10-27-20 @ 23:20):    polymorphonuclear leukocytes seen    No organisms seen    by cytocentrifuge  Preliminary Report (10-28-20 @ 20:18):    No growth    Culture - Blood (collected 10-27-20 @ 05:20)  Source: .Blood Blood-Venous  Gram Stain (10-28-20 @ 02:51):    Growth in aerobic bottle: Gram Positive Cocci in Pairs and Chains    Growth in anaerobic bottle: Gram Positive Cocci in Pairs and Chains  Final Report (10-28-20 @ 21:15):    Growth in aerobic and anaerobic bottles: Enterococcus faecalis    See previous culture [id number]    Culture - Blood (collected 10-27-20 @ 05:20)  Source: .Blood Blood-Peripheral  Gram Stain (10-28-20 @ 03:37):    Growth in anaerobic bottle: Gram Positive Cocci in Pairs and Chains    Growth in aerobic bottle: Gram Positive Cocci in Pairs and Chains  Final Report (10-28-20 @ 22:37):    Growth in aerobic and anaerobic bottles: Enterococcus faecalis    See previous culture [id number]    Culture - Urine (collected 10-24-20 @ 22:15)  Source: .Urine Clean Catch (Midstream)  Final Report (10-26-20 @ 07:55):    <10,000 CFU/mL Normal Urogenital Tari    Culture - Blood (collected 10-24-20 @ 20:28)  Source: .Blood Blood-Peripheral  Gram Stain (10-25-20 @ 16:04):    Growth in aerobic and anaerobic bottles: Gram Positive Cocci in Pairs and    Chains  Final Report (10-27-20 @ 14:11):    Growth in aerobic and anaerobic bottles: Enterococcus faecalis    See previous culture [id number]    Culture - Blood (collected 10-24-20 @ 20:28)  Source: .Blood Blood-Peripheral  Gram Stain (10-25-20 @ 16:01):    Growth in anaerobic bottle: Gram Positive Cocci in Pairs and Chains    Growth in aerobic bottle: Gram Positive Cocci in Pairs and Chains  Final Report (10-27-20 @ 14:11):    Growth in aerobic and anaerobic bottles: Enterococcus faecalis    "Due to technical problems, Proteus sp. will Not be reported as part of    the BCID panel until further notice" ***Blood Panel PCR results on this    specimen are available    approximately 3 hours after the Gram stain result.***    Gram stain, PCR, and/or culture results may not always    correspond due to difference in methodologies.    ************************************************************    This PCR assay was performed using [employer].    The following targets are tested for: Enterococcus,    vancomycin resistant enterococci, Listeria monocytogenes,    coagulase negative staphylococci, S. aureus,    methicillin resistant S. aureus, Streptococcus agalactiae    (Group B), S. pneumoniae, S. pyogenes (Group A),    Acinetobacter baumannii, Enterobacter cloacae, E. coli,    Klebsiella oxytoca, K. pneumoniae, Proteus sp.,    Serratia marcescens, Haemophilus influenzae,    Neisseria meningitidis, Pseudomonas aeruginosa, Candida    albicans, C. glabrata, C krusei, C parapsilosis,    C. tropicalis and the KPC resistance gene.  Organism: Blood Culture PCR  Enterococcus faecalis (10-27-20 @ 14:11)  Organism: Enterococcus faecalis (10-27-20 @ 14:11)      -  Ampicillin: S <=2 Predicts results to ampicillin/sulbactam, amoxacillin-clavulanate and  piperacillin-tazobactam.      -  Gentamicin synergy: R      -  Vancomycin: S 2      Method Type: COLTEN  Organism: Blood Culture PCR (10-27-20 @ 14:11)      -  Enterococcus species: Detec      Method Type: PCR    VITALS:  T(C): 36.6 (10-29-20 @ 08:00), Max: 36.6 (10-29-20 @ 08:00)  T(F): 97.8 (10-29-20 @ 08:00), Max: 97.8 (10-29-20 @ 08:00)  HR: 85 (10-29-20 @ 09:05) (78 - 88)  BP: 113/78 (10-29-20 @ 09:00) (111/74 - 139/72)  BP(mean): 88 (10-29-20 @ 09:00) (84 - 101)  ABP: --  ABP(mean): --  RR: 17 (10-29-20 @ 09:00) (12 - 20)  SpO2: 98% (10-29-20 @ 09:05) (92% - 100%)  CVP(mm Hg): --  CVP(cm H2O): --    Ins and Outs     10-28-20 @ 07:01  -  10-29-20 @ 07:00  --------------------------------------------------------  IN: 1230 mL / OUT: 2490 mL / NET: -1260 mL    I&O's Detail    28 Oct 2020 07:01  -  29 Oct 2020 07:00  --------------------------------------------------------  IN:    IV PiggyBack: 100 mL    IV PiggyBack: 250 mL    IV PiggyBack: 400 mL    Oral Fluid: 480 mL  Total IN: 1230 mL    OUT:    Chest Tube (mL): 390 mL    Indwelling Catheter - Urethral (mL): 2100 mL  Total OUT: 2490 mL    Total NET: -1260 mL

## 2020-10-29 NOTE — PROGRESS NOTE ADULT - ASSESSMENT
80M admitted to ICU with hypoxemic respiratory failure secondary to combination of large right sided pleural effusion as well as significant mucus plug, with likely underlying PNA. Patient has required HFNC with high FIO2 need since ICU arrival.    Events last 24 hours:   -Patient had episode of hypoxia while eating earlier today. Patient was restarted on HFNC. Currently on 40/40    PLAN:    #Acute hypoxic respiratory failure w/ large right plural effusion and mucous plug/atelectasis  -patient restarted on HFNC, 40% FIO2 and 40 LPM flow rate d/t episode of hypoxia while eating. Will continue to titrate down both FIO2 and LPM, with goal of trialing NC in the morning.  -continue duonebs and budesonide for COPD  -S/P pigtail CT, will monitor output, replace with albumin 25% if output >1L  -diurese with lasix daily while monitoring lytes.   -for likely underlying PNA patient is on rocephin, vanco, and azithro. Will need to complete course.  -continue with aggressive chest PT, pulmonary toilet, bed percussion, and IS use    #Anemia   -patient Hemoglobin is 8.8 today and shows no immediate signs of acute anemia.   -patient is on erythropoeitn stimulation medications at home, would have nephro/heme eval patient regarding continued use given anemia     #hypothyroid   - Patient received synthroid 100mcg, c/w current dosage     #BPH  - Patient on flomax 0.4mg twice daily. Will continue to prevent retention     #Dvt prophylaxis   - Lovenox 40mg subq once daily     #GI prophylaxis   - Protonix 40mg IVP once daily      80M admitted to ICU with hypoxemic respiratory failure secondary to combination of large right sided pleural effusion as well as significant mucus plug, with likely underlying PNA. Patient has required HFNC with high FIO2 need since ICU arrival.    Events last 24 hours:   -Patient had episode of hypoxia while eating earlier today. Patient was restarted on HFNC. Currently on 40/40    PLAN:    #Acute hypoxic respiratory failure w/ large right plural effusion and mucous plug/atelectasis  -patient restarted on HFNC, 40% FIO2 and 40 LPM flow rate d/t episode of hypoxia while eating. Will continue to titrate down both FIO2 and LPM, with goal of trialing NC in the morning.  -continue duonebs and budesonide for COPD  -S/P pigtail CT, will monitor output, replace with albumin 25% if output >1L  -diurese with lasix daily while monitoring lytes.   -for likely underlying PNA patient is on rocephin, vanco, and azithro. Will need to complete course.  -continue with aggressive chest PT, pulmonary toilet, bed percussion, and IS use    #Enterococcal Bactermia:  -noted on blood cultures from 10/27/2020  -currently on rocephin and amipicillin, will need to compelte course, ID following    #Anemia   -patient Hemoglobin is 8.8 today and shows no immediate signs of acute anemia.   -patient is on erythropoeitn stimulation medications at home, would have nephro/heme eval patient regarding continued use given anemia     #hypothyroid   - Patient received synthroid 100mcg, c/w current dosage     #BPH  - Patient on flomax 0.4mg twice daily. Will continue to prevent retention     #Dvt prophylaxis   - Lovenox 40mg subq once daily     #GI prophylaxis   - Protonix 40mg IVP once daily

## 2020-10-29 NOTE — PHARMACOTHERAPY INTERVENTION NOTE - COMMENTS
Resume home medication amiodarone.
Pt currently on ceftriaxone/azithromycin for pneumonia. Now growing enterococcus bacteremia not covered by current antibiotics. Recommend adding vancomycin 1g daily pending sensitivities.

## 2020-10-29 NOTE — PROGRESS NOTE ADULT - SUBJECTIVE AND OBJECTIVE BOX
Patient is a 80y old  Male who presents with a chief complaint of SOB/Hypoxia/Right lung white out/mucous plug (29 Oct 2020 20:39)    Brief Hospital Course:  80M admitted to ICU with hypoxemic respiratory failure secondary to combination of large right sided pleural effusion as well as significant mucus plug, with likely underlying PNA. Patient has required HFNC with high FIO2 need since ICU arrival.    Events last 24 hours:   Patient had episode of hypoxia while eating earlier today. Patient was restarted on HFNC. Currently on 40/40     PAST MEDICAL & SURGICAL HISTORY:  Anemia    Cardiac arrhythmia    Anxiety    COPD (chronic obstructive pulmonary disease)    Aortic aneurysm    Heart disease  sp cardiac cath with stent placement    BPH (benign prostatic hyperplasia)    Hypertension    S/P coronary artery stent placement  1 stent- blocked artery        Review of Systems:  CONSTITUTIONAL: No fever, chills, or fatigue  EYES: No eye pain, visual disturbances, or discharge  ENMT:  No difficulty hearing, tinnitus, vertigo; No sinus or throat pain  NECK: No pain or stiffness  RESPIRATORY: No cough, wheezing, chills or hemoptysis; No shortness of breath  CARDIOVASCULAR: No chest pain, palpitations, dizziness, or leg swelling  GASTROINTESTINAL: No abdominal or epigastric pain. No nausea, vomiting, or hematemesis; No diarrhea or constipation. No melena or hematochezia.  GENITOURINARY: No dysuria, frequency, hematuria, or incontinence  NEUROLOGICAL: No headaches, memory loss, loss of strength, numbness, or tremors  SKIN: No itching, burning, rashes, or lesions   MUSCULOSKELETAL: No joint pain or swelling; No muscle, back, or extremity pain  PSYCHIATRIC: No depression, anxiety, mood swings, or difficulty sleeping      Medications:  ampicillin  IVPB 2 Gram(s) IV Intermittent every 6 hours  cefTRIAXone   IVPB 2000 milliGRAM(s) IV Intermittent every 12 hours  aMIOdarone    Tablet 200 milliGRAM(s) Oral daily  amLODIPine   Tablet 10 milliGRAM(s) Oral daily  furosemide   Injectable 40 milliGRAM(s) IV Push every 12 hours  tamsulosin 0.4 milliGRAM(s) Oral two times a day  albuterol/ipratropium for Nebulization 3 milliLiter(s) Nebulizer every 6 hours PRN  budesonide 160 MICROgram(s)/formoterol 4.5 MICROgram(s) Inhaler 2 Puff(s) Inhalation two times a day  acetaminophen   Tablet .. 650 milliGRAM(s) Oral every 6 hours PRN  ALPRAZolam 0.25 milliGRAM(s) Oral at bedtime PRN  enoxaparin Injectable 40 milliGRAM(s) SubCutaneous daily  bisacodyl Suppository 10 milliGRAM(s) Rectal daily  pantoprazole    Tablet 40 milliGRAM(s) Oral before breakfast  senna 2 Tablet(s) Oral at bedtime  levothyroxine 100 MICROGram(s) Oral daily  influenza   Vaccine 0.5 milliLiter(s) IntraMuscular once  chlorhexidine 2% Cloths 1 Application(s) Topical <User Schedule>  lidocaine   Patch 1 Patch Transdermal every 24 hours      ICU Vital Signs Last 24 Hrs  T(C): 36.4 (29 Oct 2020 20:00), Max: 36.7 (29 Oct 2020 12:00)  T(F): 97.6 (29 Oct 2020 20:00), Max: 98.1 (29 Oct 2020 16:00)  HR: 85 (29 Oct 2020 21:00) (79 - 87)  BP: 122/76 (29 Oct 2020 21:00) (99/65 - 131/76)  BP(mean): 90 (29 Oct 2020 21:00) (77 - 96)  RR: 17 (29 Oct 2020 21:00) (12 - 21)  SpO2: 97% (29 Oct 2020 21:00) (87% - 100%)      I&O's Detail    28 Oct 2020 07:01  -  29 Oct 2020 07:00  --------------------------------------------------------  IN:    IV PiggyBack: 100 mL    IV PiggyBack: 250 mL    IV PiggyBack: 400 mL    Oral Fluid: 480 mL  Total IN: 1230 mL    OUT:    Chest Tube (mL): 390 mL    Indwelling Catheter - Urethral (mL): 2100 mL  Total OUT: 2490 mL    Total NET: -1260 mL      29 Oct 2020 07:01  -  29 Oct 2020 21:12  --------------------------------------------------------  IN:    IV PiggyBack: 200 mL    IV PiggyBack: 100 mL    IV PiggyBack: 300 mL  Total IN: 600 mL    OUT:    Chest Tube (mL): 50 mL    Indwelling Catheter - Urethral (mL): 750 mL  Total OUT: 800 mL    Total NET: -200 mL      LABS:                        8.8    9.58  )-----------( 293      ( 29 Oct 2020 06:15 )             28.3     10-29    142  |  104  |  18  ----------------------------<  85  3.4<L>   |  31  |  1.10    Ca    9.1      29 Oct 2020 06:15  Phos  2.8     10-29  Mg     2.3     10-29    TPro  6.2  /  Alb  2.9<L>  /  TBili  0.4  /  DBili  x   /  AST  11  /  ALT  18  /  AlkPhos  94  10-29      CULTURES:  Culture Results:   No growth (10-27-20 @ 12:15)  Culture Results:   Testing in progress (10-27-20 @ 12:15)  Culture Results:   Growth in aerobic and anaerobic bottles: Enterococcus faecalis  See previous culture [id number] (10-27-20 @ 05:20)  Culture Results:   Growth in aerobic and anaerobic bottles: Enterococcus faecalis  See previous culture [id number] (10-27-20 @ 05:20)  Culture Results:   <10,000 CFU/mL Normal Urogenital Tari (10-24-20 @ 22:15)  Culture Results:   Growth in aerobic and anaerobic bottles: Enterococcus faecalis  "Due to technical problems, Proteus sp. will Not be reported as part of  the BCID panel until further notice" ***Blood Panel PCR results on this  specimen are available  approximately 3 hours after the Gram stain result.***  Gram stain, PCR, and/or culture results may not always  correspond due to difference in methodologies.  ************************************************************  This PCR assay was performed using [employer].  The following targets are tested for: Enterococcus,  vancomycin resistant enterococci, Listeria monocytogenes,  coagulase negative staphylococci, S. aureus,  methicillin resistant S. aureus, Streptococcus agalactiae  (Group B), S. pneumoniae, S. pyogenes (Group A),  Acinetobacter baumannii, Enterobacter cloacae, E. coli,  Klebsiella oxytoca, K. pneumoniae, Proteus sp.,  Serratia marcescens, Haemophilus influenzae,  Neisseria meningitidis, Pseudomonas aeruginosa, Candida  albicans, C. glabrata, C krusei, C parapsilosis,  C. tropicalis and the KPC resistance gene. (10-24-20 @ 20:28)  Culture Results:   Growth in aerobic and anaerobic bottles: Enterococcus faecalis  See previous culture [id number] (10-24-20 @ 20:28)      Physical Examination:    General: No acute distress.  Alert, oriented, interactive, nonfocal    HEENT: Pupils equal, reactive to light.  Symmetric.    PULM: Diminished breath sounds however, improving on serial physical exams. No significant sputum production noted. Right pigtail catheter in place, clean dry and intact    CVS: Regular rate and rhythm, no murmurs, rubs, or gallops    ABD: Soft, nondistended, nontender, normoactive bowel sounds, no masses    EXT: No edema, nontender    SKIN: Warm and well perfused, no rashes noted.    RADIOLOGY:     CRITICAL CARE TIME SPENT:  minutes of critical care time spent providing medical care for patient's acute illness/conditions that impairs at least one vital organ system and/or poses a high risk of imminent or life threatening deterioration in the patient's condition. It includes time spent evaluating and treating the patient's acute illness as well as time spent reviewing labs, radiology, discussing goals of care with patient and/or patient's family, and discussing the case with a multidisciplinary team, including the eICU, in an effort to prevent further life threatening deterioration or end organ damage. This time is independent of any procedures performed.       Patient is a 80y old  Male who presents with a chief complaint of SOB/Hypoxia/Right lung white out/mucous plug (29 Oct 2020 20:39)    Brief Hospital Course:  80M admitted to ICU with hypoxemic respiratory failure secondary to combination of large right sided pleural effusion as well as significant mucus plug, with likely underlying PNA. Patient has required HFNC with high FIO2 need since ICU arrival.    Events last 24 hours:   Patient had episode of hypoxia while eating earlier today. Patient was restarted on HFNC. Currently on 40/40     PAST MEDICAL & SURGICAL HISTORY:  Anemia    Cardiac arrhythmia    Anxiety    COPD (chronic obstructive pulmonary disease)    Aortic aneurysm    Heart disease  sp cardiac cath with stent placement    BPH (benign prostatic hyperplasia)    Hypertension    S/P coronary artery stent placement  1 stent- blocked artery        Review of Systems:  CONSTITUTIONAL: No fever, chills, or fatigue  EYES: No eye pain, visual disturbances, or discharge  ENMT:  No difficulty hearing, tinnitus, vertigo; No sinus or throat pain  NECK: No pain or stiffness  RESPIRATORY: No cough, wheezing, chills or hemoptysis; No shortness of breath  CARDIOVASCULAR: No chest pain, palpitations, dizziness, or leg swelling  GASTROINTESTINAL: No abdominal or epigastric pain. No nausea, vomiting, or hematemesis; No diarrhea or constipation. No melena or hematochezia.  GENITOURINARY: No dysuria, frequency, hematuria, or incontinence  NEUROLOGICAL: No headaches, memory loss, loss of strength, numbness, or tremors  SKIN: No itching, burning, rashes, or lesions   MUSCULOSKELETAL: No joint pain or swelling; No muscle, back, or extremity pain  PSYCHIATRIC: No depression, anxiety, mood swings, or difficulty sleeping      Medications:  ampicillin  IVPB 2 Gram(s) IV Intermittent every 6 hours  cefTRIAXone   IVPB 2000 milliGRAM(s) IV Intermittent every 12 hours  aMIOdarone    Tablet 200 milliGRAM(s) Oral daily  amLODIPine   Tablet 10 milliGRAM(s) Oral daily  furosemide   Injectable 40 milliGRAM(s) IV Push every 12 hours  tamsulosin 0.4 milliGRAM(s) Oral two times a day  albuterol/ipratropium for Nebulization 3 milliLiter(s) Nebulizer every 6 hours PRN  budesonide 160 MICROgram(s)/formoterol 4.5 MICROgram(s) Inhaler 2 Puff(s) Inhalation two times a day  acetaminophen   Tablet .. 650 milliGRAM(s) Oral every 6 hours PRN  ALPRAZolam 0.25 milliGRAM(s) Oral at bedtime PRN  enoxaparin Injectable 40 milliGRAM(s) SubCutaneous daily  bisacodyl Suppository 10 milliGRAM(s) Rectal daily  pantoprazole    Tablet 40 milliGRAM(s) Oral before breakfast  senna 2 Tablet(s) Oral at bedtime  levothyroxine 100 MICROGram(s) Oral daily  influenza   Vaccine 0.5 milliLiter(s) IntraMuscular once  chlorhexidine 2% Cloths 1 Application(s) Topical <User Schedule>  lidocaine   Patch 1 Patch Transdermal every 24 hours      ICU Vital Signs Last 24 Hrs  T(C): 36.4 (29 Oct 2020 20:00), Max: 36.7 (29 Oct 2020 12:00)  T(F): 97.6 (29 Oct 2020 20:00), Max: 98.1 (29 Oct 2020 16:00)  HR: 85 (29 Oct 2020 21:00) (79 - 87)  BP: 122/76 (29 Oct 2020 21:00) (99/65 - 131/76)  BP(mean): 90 (29 Oct 2020 21:00) (77 - 96)  RR: 17 (29 Oct 2020 21:00) (12 - 21)  SpO2: 97% (29 Oct 2020 21:00) (87% - 100%)      I&O's Detail    28 Oct 2020 07:01  -  29 Oct 2020 07:00  --------------------------------------------------------  IN:    IV PiggyBack: 100 mL    IV PiggyBack: 250 mL    IV PiggyBack: 400 mL    Oral Fluid: 480 mL  Total IN: 1230 mL    OUT:    Chest Tube (mL): 390 mL    Indwelling Catheter - Urethral (mL): 2100 mL  Total OUT: 2490 mL    Total NET: -1260 mL      29 Oct 2020 07:01  -  29 Oct 2020 21:12  --------------------------------------------------------  IN:    IV PiggyBack: 200 mL    IV PiggyBack: 100 mL    IV PiggyBack: 300 mL  Total IN: 600 mL    OUT:    Chest Tube (mL): 50 mL    Indwelling Catheter - Urethral (mL): 750 mL  Total OUT: 800 mL    Total NET: -200 mL      LABS:                        8.8    9.58  )-----------( 293      ( 29 Oct 2020 06:15 )             28.3     10-29    142  |  104  |  18  ----------------------------<  85  3.4<L>   |  31  |  1.10    Ca    9.1      29 Oct 2020 06:15  Phos  2.8     10-29  Mg     2.3     10-29    TPro  6.2  /  Alb  2.9<L>  /  TBili  0.4  /  DBili  x   /  AST  11  /  ALT  18  /  AlkPhos  94  10-29      CULTURES:  Culture Results:   No growth (10-27-20 @ 12:15)  Culture Results:   Testing in progress (10-27-20 @ 12:15)  Culture Results:   Growth in aerobic and anaerobic bottles: Enterococcus faecalis  See previous culture [id number] (10-27-20 @ 05:20)  Culture Results:   Growth in aerobic and anaerobic bottles: Enterococcus faecalis  See previous culture [id number] (10-27-20 @ 05:20)  Culture Results:   <10,000 CFU/mL Normal Urogenital Tari (10-24-20 @ 22:15)  Culture Results:   Growth in aerobic and anaerobic bottles: Enterococcus faecalis  "Due to technical problems, Proteus sp. will Not be reported as part of  the BCID panel until further notice" ***Blood Panel PCR results on this  specimen are available  approximately 3 hours after the Gram stain result.***  Gram stain, PCR, and/or culture results may not always  correspond due to difference in methodologies.  ************************************************************  This PCR assay was performed using [employer].  The following targets are tested for: Enterococcus,  vancomycin resistant enterococci, Listeria monocytogenes,  coagulase negative staphylococci, S. aureus,  methicillin resistant S. aureus, Streptococcus agalactiae  (Group B), S. pneumoniae, S. pyogenes (Group A),  Acinetobacter baumannii, Enterobacter cloacae, E. coli,  Klebsiella oxytoca, K. pneumoniae, Proteus sp.,  Serratia marcescens, Haemophilus influenzae,  Neisseria meningitidis, Pseudomonas aeruginosa, Candida  albicans, C. glabrata, C krusei, C parapsilosis,  C. tropicalis and the KPC resistance gene. (10-24-20 @ 20:28)  Culture Results:   Growth in aerobic and anaerobic bottles: Enterococcus faecalis  See previous culture [id number] (10-24-20 @ 20:28)      Physical Examination:    General: No acute distress.  Alert, oriented, interactive, nonfocal    HEENT: Pupils equal, reactive to light.  Symmetric.    PULM: Diminished breath sounds however, improving on serial physical exams. No significant sputum production noted. Right pigtail catheter in place, clean dry and intact    CVS: Regular rate and rhythm, no murmurs, rubs, or gallops    ABD: Soft, nondistended, nontender, normoactive bowel sounds, no masses    EXT: No edema, nontender    SKIN: Warm and well perfused, no rashes noted.

## 2020-10-30 NOTE — PROGRESS NOTE ADULT - ASSESSMENT
80 year old man admitted with respiratory failure due to probable pneumonia.  He has large pleural effusion, mucous plug right upper lobe collapse on oxygen.   s/p chest tube  Bacteremia. Hypokalemia   s/p stent graft repair of AAA.... no CT evidence of infection of graft.   CAD. Cardiac arrhyhtmia... ? type     Plan  - discussed with ICU team... he is high risk for complications from BRANDEN and would likely need to  be intubated to protect airway if this is pursued .. family is declining any invasive testing   - continue conservative care   - antibiotics as per ID  - continue diuresis  - continue amiodarone   - continue amlodipine   - chest tube as per ICU  - K supplementation     discussed with ICU team

## 2020-10-30 NOTE — PROGRESS NOTE ADULT - ASSESSMENT
80y male with a PMH of HTN, COPD, CAD, hypothyroidism, ascending aneurysm, BPH, s/p stent graft to repair AAA in late August /early September at [facility]   His Chest CT scan in September showed B/L effusions, RT>Lt, RUL atelectasis, endobronchial debris, and aortic graft stent with endoleak. S/p thoracentesis with fluid transudative. Was treated with Zosyn    Admitted on 10/24 with c/o SOB & started CTX and azithro at [facility]  He has multiple reasons for shortness of breath-effusions, atelectasis, and infiltrates.  Vancomycin added after blood cx recovered Enterococcus which is not a respiratory pathogen.  The organism is not growing in the urine, therefore raises concerns of endocarditis as well as graft infection. With his recent ? GI bleed, I am concerned about status of aneurysm and graft.  He has had a sustained bacteremia-2 sets of 10/24 blood cultures with enterococcus and 2 sets of blood cultures sent on 10/27 with enterococcus  Sustained bacteremia suggestive of endovascular focus  TTE without evidence of endocarditis.  CTA of abd & pelvis revealed stable appearance of descending thoracoabdominal aortic stent graft. No evidence of perigraft collection or gas to suggest graft infection. 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   10/29 blood cultures are pending.  pleural fluid is not infected, appears to be in heart failure    Suggest:  1. Continue Amp/CTX for extended enterococcal therapy  2. Follow repeat blood cultures from t10/29  to assess status of bacteremia  3. BRANDEN would normally be advised, although I agree with cardiology that unless approach is altered by findings it may be deferred  4.Case reviewed with intensivist; as per his review with vascular; no support for graft infection on CTA & no intervention is planned, recommendation is that he must follow with his primary vascular surgeon as outpatient  5.Pigtail as per pulmonary

## 2020-10-30 NOTE — PROGRESS NOTE ADULT - NUTRITIONAL ASSESSMENT
This patient has been assessed with a concern for Malnutrition and has been determined to have a diagnosis/diagnoses of Severe protein-calorie malnutrition.    This patient is being managed with:   Diet DASH/TLC-  Sodium & Cholesterol Restricted  Entered: Oct 25 2020  3:16PM    

## 2020-10-30 NOTE — PROGRESS NOTE ADULT - SUBJECTIVE AND OBJECTIVE BOX
CC: f/u for enterococcal bacteremia    Patient reports: he is doing poorly, continues to be short of breath and washed out in appearance.    REVIEW OF SYSTEMS:  All other review of systems negative (Comprehensive ROS): limited, largely bedridden    Antimicrobials Day #  :day 5  ampicillin  IVPB 2 Gram(s) IV Intermittent every 6 hours  cefTRIAXone   IVPB 2000 milliGRAM(s) IV Intermittent every 12 hours    Other Medications Reviewed  MEDICATIONS  (STANDING):  aMIOdarone    Tablet 200 milliGRAM(s) Oral daily  amLODIPine   Tablet 10 milliGRAM(s) Oral daily  ampicillin  IVPB 2 Gram(s) IV Intermittent every 6 hours  bisacodyl Suppository 10 milliGRAM(s) Rectal daily  budesonide 160 MICROgram(s)/formoterol 4.5 MICROgram(s) Inhaler 2 Puff(s) Inhalation two times a day  cefTRIAXone   IVPB 2000 milliGRAM(s) IV Intermittent every 12 hours  chlorhexidine 2% Cloths 1 Application(s) Topical <User Schedule>  enoxaparin Injectable 40 milliGRAM(s) SubCutaneous daily  furosemide   Injectable 40 milliGRAM(s) IV Push every 12 hours  influenza   Vaccine 0.5 milliLiter(s) IntraMuscular once  levothyroxine 100 MICROGram(s) Oral daily  lidocaine   Patch 1 Patch Transdermal every 24 hours  pantoprazole    Tablet 40 milliGRAM(s) Oral before breakfast  potassium chloride    Tablet ER 40 milliEquivalent(s) Oral once  potassium chloride  10 mEq/100 mL IVPB 10 milliEquivalent(s) IV Intermittent every 1 hour  senna 2 Tablet(s) Oral at bedtime  tamsulosin 0.4 milliGRAM(s) Oral two times a day    T(F): 97.6 (10-30-20 @ 04:00), Max: 98.1 (10-29-20 @ 16:00)  HR: 86 (10-30-20 @ 06:00)  BP: 114/69 (10-30-20 @ 06:00)  RR: 19 (10-30-20 @ 06:00)  SpO2: 100% (10-30-20 @ 06:00)  Wt(kg): --    PHYSICAL EXAM:  General: alert, mild respiratory distress  Eyes:  anicteric, no conjunctival injection, no discharge  Oropharynx: no lesions or injection 	  Neck: supple, without adenopathy  Lungs: decreased at bases  Heart: irregular rate and rhythm; distant tones  Abdomen: soft, nondistended, nontender, without mass or organomegaly  Skin: no lesions  Extremities: no clubbing, cyanosis, or edema  Neurologic: alert, oriented, moves all extremities  RT chest tube in place  LAB RESULTS:                        8.8    10.78 )-----------( 230      ( 30 Oct 2020 05:15 )             28.6     10-30    139  |  102  |  17  ----------------------------<  87  3.3<L>   |  32<H>  |  1.19    Ca    9.0      30 Oct 2020 05:15  Phos  2.9     10-30  Mg     2.2     10-30    TPro  6.2  /  Alb  2.9<L>  /  TBili  0.4  /  DBili  x   /  AST  11  /  ALT  18  /  AlkPhos  94  10-29    LIVER FUNCTIONS - ( 29 Oct 2020 06:15 )  Alb: 2.9 g/dL / Pro: 6.2 g/dL / ALK PHOS: 94 U/L / ALT: 18 U/L / AST: 11 U/L / GGT: x             MICROBIOLOGY:  RECENT CULTURES:  10-27 @ 12:15 Pleural Fl Pleural Fluid     No growth    polymorphonuclear leukocytes seen  No organisms seen  by cytocentrifuge    10-27 @ 05:20 .Blood Blood-Peripheral     Growth in aerobic and anaerobic bottles: Enterococcus faecalis  See previous culture [id number]    Growth in anaerobic bottle: Gram Positive Cocci in Pairs and Chains  Growth in aerobic bottle: Gram Positive Cocci in Pairs and Chains        RADIOLOGY REVIEWED:  d  < from: Xray Chest 1 View- PORTABLE-Routine (Xray Chest 1 View- PORTABLE-Routine .) (10.29.20 @ 10:01) >  IMPRESSION:    Right pigtail thoracostomy tube. Small right apical pneumothorax.    Bibasilar infiltrates and effusions unchanged.    Cardiomegaly. Descending thoracic aortic stent graft.      < end of copied text >  < from: CT Angio Abdomen and Pelvis w/ IV Cont (10.28.20 @ 11:02) >  IMPRESSION:    Stable appearance of descending thoracoabdominal aortic stent graft. No evidence of perigraft collection or gas to suggest graft infection.    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 Stable excluded abdominal aortic aneurysm sac diameter measuring 5.1 cm.    Occluded right renal     < end of copied text >

## 2020-10-30 NOTE — PROGRESS NOTE ADULT - SUBJECTIVE AND OBJECTIVE BOX
`SUBJ:  Patient is a 80y old  Male who presents with a chief complaint of SOB/Hypoxia/Right lung white out/mucous plug (30 Oct 2020 10:58)        PAST MEDICAL & SURGICAL HISTORY:  Anemia    Cardiac arrhythmia    Anxiety    COPD (chronic obstructive pulmonary disease)    Aortic aneurysm    Heart disease  sp cardiac cath with stent placement    BPH (benign prostatic hyperplasia)    Hypertension    S/P coronary artery stent placement  1 stent- blocked artery        MEDICATIONS  (STANDING):  aMIOdarone    Tablet 200 milliGRAM(s) Oral daily  amLODIPine   Tablet 10 milliGRAM(s) Oral daily  ampicillin  IVPB 2 Gram(s) IV Intermittent every 6 hours  bisacodyl Suppository 10 milliGRAM(s) Rectal daily  budesonide 160 MICROgram(s)/formoterol 4.5 MICROgram(s) Inhaler 2 Puff(s) Inhalation two times a day  cefTRIAXone   IVPB 2000 milliGRAM(s) IV Intermittent every 12 hours  chlorhexidine 2% Cloths 1 Application(s) Topical <User Schedule>  enoxaparin Injectable 40 milliGRAM(s) SubCutaneous daily  furosemide   Injectable 40 milliGRAM(s) IV Push every 12 hours  influenza   Vaccine 0.5 milliLiter(s) IntraMuscular once  levothyroxine 100 MICROGram(s) Oral daily  lidocaine   Patch 1 Patch Transdermal every 24 hours  pantoprazole    Tablet 40 milliGRAM(s) Oral before breakfast  senna 2 Tablet(s) Oral at bedtime  tamsulosin 0.4 milliGRAM(s) Oral two times a day    MEDICATIONS  (PRN):  acetaminophen   Tablet .. 650 milliGRAM(s) Oral every 6 hours PRN Temp greater or equal to 38C (100.4F), Mild Pain (1 - 3)  albuterol/ipratropium for Nebulization 3 milliLiter(s) Nebulizer every 6 hours PRN Shortness of Breath and/or Wheezing  ALPRAZolam 0.25 milliGRAM(s) Oral at bedtime PRN insomnia          Vital Signs Last 24 Hrs  T(C): 36.7 (30 Oct 2020 08:00), Max: 36.7 (29 Oct 2020 16:00)  T(F): 98.1 (30 Oct 2020 08:00), Max: 98.1 (29 Oct 2020 16:00)  HR: 72 (30 Oct 2020 09:23) (72 - 86)  BP: 109/56 (30 Oct 2020 09:00) (99/65 - 133/77)  BP(mean): 73 (30 Oct 2020 09:00) (73 - 99)  RR: 16 (30 Oct 2020 09:00) (13 - 21)  SpO2: 98% (30 Oct 2020 09:23) (87% - 100%)    REVIEW OF SYSTEMS: unable to obtain       PHYSICAL EXAM  Constitutional:  WDWN. No acute distress  HEENT: normocephalic, atraumatic.  PERRLA. EOMI  Neck : No JVD. no carotid bruits  Lungs: decreased breath sounds bases   Heart:  S1 and S2. No S3, S4. II/VI systolic murmur.  Abdomen:  soft, non tender.  Extremities: No clubbing, cyanoisis or edema  Nuerologic:  A+O x 3. No focal deficits  Skin:  no rashes        LABS:                        8.8    10.78 )-----------( 230      ( 30 Oct 2020 05:15 )             28.6     10-30    139  |  102  |  17  ----------------------------<  87  3.3<L>   |  32<H>  |  1.19    Ca    9.0      30 Oct 2020 05:15  Phos  2.9     10-30  Mg     2.2     10-30    TPro  6.2  /  Alb  2.9<L>  /  TBili  0.4  /  DBili  x   /  AST  11  /  ALT  18  /  AlkPhos  94  10-29        I&O's Summary    29 Oct 2020 07:01  -  30 Oct 2020 07:00  --------------------------------------------------------  IN: 950 mL / OUT: 1730 mL / NET: -780 mL    < from: TTE Echo Complete w/o Contrast w/ Doppler (10.25.20 @ 09:11) >   1. Mildly to moderately decreased global left ventricular systolic function.   2. Spectral Doppler shows impaired relaxation pattern of left ventricular myocardial filling (Grade I diastolic dysfunction).   3. There is moderate concentric left ventricular hypertrophy.   4. Dyskinetic apex.   5. Mildly increased RV wall thickness.   6. Possible mobile opacification in left atrium.   7. Mildly enlarged right atrium.   8. Moderate pleural effusion in both left andright lateral regions.   9. Mild thickening and calcification of the anterior and posterior mitral valve leaflets.  10. Mild tricuspid regurgitation.  11. Sclerotic aortic valve with decreased opening.  12. Dilatation of the aortic root.    < end of copied text >    `< from: 12 Lead ECG (10.24.20 @ 20:32) >  Normal sinus rhythm with premature atrial contractions.  Low voltage QRS  Abnormal ECG    < end of copied text >

## 2020-10-30 NOTE — PROGRESS NOTE ADULT - ASSESSMENT
Physical Examination:  GENERAL:               Alert, Oriented, No acute distress.    HEENT:                    Pupils equal, reactive to light. Symmetric. No JVD, Moist MM  PULM:                     Bilateral air entry, chest tube to the right  CVS:                         S1, S2,  No Murmur  ABD:                        Soft, nondistended, nontender, normoactive bowel sounds,   EXT:                         No edema, nontender, No Cyanosis or Clubbing   Vascular:                Warm Extremities, Normal Capillary refill, Normal Distal Pulses  SKIN:                       Warm and well perfused, no rashes noted.   NEURO:                  Alert, oriented, interactive, follows commands  PSYC:                      Calm, + Insight.      Assessment:  1. Acute respiratory failure  2. Bilateral pleural effusion  3. COPD  4. Right upper lobe collapse   5. Coronary artery disease   6. Hypertension   7. Hypothyroidism  8. Bacteremia - enterococcus faecalis     Plan  - Echo showing moderate LV dysfunction though no EF reported, also noted with left atrial mobile mass  - Cardiology consult appreciated, may need BRANDEN, though patient is high risk for any intervention given extensive comorbid conditions and may need to be intubated for any procedures. Discussed with daughter about mechanical ventilation. Suggested she would not want him to be intubated for the procedure.   - Pleural fluid is suggestive of transudative in nature  - Place chest tube to water seal today, obtain CXR today. If stable will d/c chest tube.   - Monitor respiratory status   - CT scan of the chest/Abdomen/Pelvis to evaluate graft patency and r/o collections - reported with PNA, no perigraft collection, no air around the stent. Discussed with vascular surgery who recommend observation for now and outpatient follow up with primary surgeon.   - Cont. diuresis with albumin for hypoalbuminemia  - Supplement potassium  - cont. symbicort and nebs  - Supplement electrolytes  - Antibiotics per ID, repeat cultures remain positive from 10/27, Will discuss with ID about treating for possible endorcarditis with long term antibiotics.   - Oxygen support to maintain saturation > 90%  - Cont. thyroid medications and BPH medications  - Daughter Kaur Ramirez ([phone]) - message left for call back  - Prognosis is guarded, given recurrent hospitalizations, coals of care discussion  - Remains with poor oral intake  - Transfer out of ICu today

## 2020-10-30 NOTE — PROGRESS NOTE ADULT - SUBJECTIVE AND OBJECTIVE BOX
Follow-up Critical Care Progress Note  Chief Complaint : Hypoxemia    Comfortable on NC. Denies any chest pain or palpitations.     Allergies :No Known Allergies      PAST MEDICAL & SURGICAL HISTORY:  Anemia    Cardiac arrhythmia    Anxiety    COPD (chronic obstructive pulmonary disease)    Aortic aneurysm    Heart disease  sp cardiac cath with stent placement    BPH (benign prostatic hyperplasia)    Hypertension    S/P coronary artery stent placement  1 stent- blocked artery    Medications:  MEDICATIONS  (STANDING):  aMIOdarone    Tablet 200 milliGRAM(s) Oral daily  amLODIPine   Tablet 10 milliGRAM(s) Oral daily  ampicillin  IVPB 2 Gram(s) IV Intermittent every 6 hours  bisacodyl Suppository 10 milliGRAM(s) Rectal daily  budesonide 160 MICROgram(s)/formoterol 4.5 MICROgram(s) Inhaler 2 Puff(s) Inhalation two times a day  cefTRIAXone   IVPB 2000 milliGRAM(s) IV Intermittent every 12 hours  chlorhexidine 2% Cloths 1 Application(s) Topical <User Schedule>  enoxaparin Injectable 40 milliGRAM(s) SubCutaneous daily  furosemide   Injectable 40 milliGRAM(s) IV Push every 12 hours  influenza   Vaccine 0.5 milliLiter(s) IntraMuscular once  levothyroxine 100 MICROGram(s) Oral daily  lidocaine   Patch 1 Patch Transdermal every 24 hours  pantoprazole    Tablet 40 milliGRAM(s) Oral before breakfast  potassium chloride    Tablet ER 40 milliEquivalent(s) Oral once  senna 2 Tablet(s) Oral at bedtime  tamsulosin 0.4 milliGRAM(s) Oral two times a day    MEDICATIONS  (PRN):  acetaminophen   Tablet .. 650 milliGRAM(s) Oral every 6 hours PRN Temp greater or equal to 38C (100.4F), Mild Pain (1 - 3)  albuterol/ipratropium for Nebulization 3 milliLiter(s) Nebulizer every 6 hours PRN Shortness of Breath and/or Wheezing  ALPRAZolam 0.25 milliGRAM(s) Oral at bedtime PRN insomnia      LABS:                        8.8    10.78 )-----------( 230      ( 30 Oct 2020 05:15 )             28.6     10-30    139  |  102  |  17  ----------------------------<  87  3.3<L>   |  32<H>  |  1.19    Ca    9.0      30 Oct 2020 05:15  Phos  2.9     10-30  Mg     2.2     10-30    TPro  6.2  /  Alb  2.9<L>  /  TBili  0.4  /  DBili  x   /  AST  11  /  ALT  18  /  AlkPhos  94  10-29    Fluid characteristics  -- 10-27 @ 12:15  pH 7.8  LDH 75  tprot 2.6    Cell count  Appearance Slightly Cloudy  Fluid type --  BF lymph 15  color Yellow  eosinophil --  PMN 20  Mesothelial --  Monocyte 65  Other body cells --    CULTURES: (if applicable)    Culture - Blood (collected 10-29-20 @ 06:20)  Source: .Blood Blood-Venous  Preliminary Report (10-30-20 @ 09:01):    No growth to date.    Culture - Blood (collected 10-29-20 @ 06:20)  Source: .Blood Blood-Peripheral  Preliminary Report (10-30-20 @ 09:01):    No growth to date.    Culture - Fungal, Body Fluid (collected 10-27-20 @ 12:15)  Source: Pleural Fl Pleural Fluid  Preliminary Report (10-28-20 @ 08:13):    Testing in progress    Culture - Body Fluid with Gram Stain (collected 10-27-20 @ 12:15)  Source: Pleural Fl Pleural Fluid  Gram Stain (10-27-20 @ 23:20):    polymorphonuclear leukocytes seen    No organisms seen    by cytocentrifuge  Preliminary Report (10-28-20 @ 20:18):    No growth    Culture - Blood (collected 10-27-20 @ 05:20)  Source: .Blood Blood-Venous  Gram Stain (10-28-20 @ 02:51):    Growth in aerobic bottle: Gram Positive Cocci in Pairs and Chains    Growth in anaerobic bottle: Gram Positive Cocci in Pairs and Chains  Final Report (10-28-20 @ 21:15):    Growth in aerobic and anaerobic bottles: Enterococcus faecalis    See previous culture [id number]    Culture - Blood (collected 10-27-20 @ 05:20)  Source: .Blood Blood-Peripheral  Gram Stain (10-28-20 @ 03:37):    Growth in anaerobic bottle: Gram Positive Cocci in Pairs and Chains    Growth in aerobic bottle: Gram Positive Cocci in Pairs and Chains  Final Report (10-28-20 @ 22:37):    Growth in aerobic and anaerobic bottles: Enterococcus faecalis    See previous culture [id number]    Culture - Urine (collected 10-24-20 @ 22:15)  Source: .Urine Clean Catch (Midstream)  Final Report (10-26-20 @ 07:55):    <10,000 CFU/mL Normal Urogenital Tari    Culture - Blood (collected 10-24-20 @ 20:28)  Source: .Blood Blood-Peripheral  Gram Stain (10-25-20 @ 16:04):    Growth in aerobic and anaerobic bottles: Gram Positive Cocci in Pairs and    Chains  Final Report (10-27-20 @ 14:11):    Growth in aerobic and anaerobic bottles: Enterococcus faecalis    See previous culture [id number]    Culture - Blood (collected 10-24-20 @ 20:28)  Source: .Blood Blood-Peripheral  Gram Stain (10-25-20 @ 16:01):    Growth in anaerobic bottle: Gram Positive Cocci in Pairs and Chains    Growth in aerobic bottle: Gram Positive Cocci in Pairs and Chains  Final Report (10-27-20 @ 14:11):    Growth in aerobic and anaerobic bottles: Enterococcus faecalis    "Due to technical problems, Proteus sp. will Not be reported as part of    the BCID panel until further notice" ***Blood Panel PCR results on this    specimen are available    approximately 3 hours after the Gram stain result.***    Gram stain, PCR, and/or culture results may not always    correspond due to difference in methodologies.    ************************************************************    This PCR assay was performed using [employer].    The following targets are tested for: Enterococcus,    vancomycin resistant enterococci, Listeria monocytogenes,    coagulase negative staphylococci, S. aureus,    methicillin resistant S. aureus, Streptococcus agalactiae    (Group B), S. pneumoniae, S. pyogenes (Group A),    Acinetobacter baumannii, Enterobacter cloacae, E. coli,    Klebsiella oxytoca, K. pneumoniae, Proteus sp.,    Serratia marcescens, Haemophilus influenzae,    Neisseria meningitidis, Pseudomonas aeruginosa, Candida    albicans, C. glabrata, C krusei, C parapsilosis,    C. tropicalis and the KPC resistance gene.  Organism: Blood Culture PCR  Enterococcus faecalis (10-27-20 @ 14:11)  Organism: Enterococcus faecalis (10-27-20 @ 14:11)      -  Ampicillin: S <=2 Predicts results to ampicillin/sulbactam, amoxacillin-clavulanate and  piperacillin-tazobactam.      -  Gentamicin synergy: R      -  Vancomycin: S 2      Method Type: COLTEN  Organism: Blood Culture PCR (10-27-20 @ 14:11)      -  Enterococcus species: Detec      Method Type: PCR    VITALS:  T(C): 36.7 (10-30-20 @ 08:00), Max: 36.7 (10-29-20 @ 12:00)  T(F): 98.1 (10-30-20 @ 08:00), Max: 98.1 (10-29-20 @ 16:00)  HR: 72 (10-30-20 @ 09:23) (72 - 86)  BP: 109/56 (10-30-20 @ 09:00) (99/65 - 133/77)  BP(mean): 73 (10-30-20 @ 09:00) (73 - 99)  ABP: --  ABP(mean): --  RR: 16 (10-30-20 @ 09:00) (13 - 21)  SpO2: 98% (10-30-20 @ 09:23) (87% - 100%)  CVP(mm Hg): --  CVP(cm H2O): --    Ins and Outs     10-29-20 @ 07:01  -  10-30-20 @ 07:00  --------------------------------------------------------  IN: 950 mL / OUT: 1730 mL / NET: -780 mL    I&O's Detail    29 Oct 2020 07:01  -  30 Oct 2020 07:00  --------------------------------------------------------  IN:    IV PiggyBack: 150 mL    IV PiggyBack: 400 mL    IV PiggyBack: 400 mL  Total IN: 950 mL    OUT:    Chest Tube (mL): 330 mL    Indwelling Catheter - Urethral (mL): 1400 mL  Total OUT: 1730 mL    Total NET: -780 mL

## 2020-10-31 NOTE — PROGRESS NOTE ADULT - ASSESSMENT
80 male with htn, copd, cad (s/p coronary stent placement), hypothyroid, ascending aortic aneurysm, anemia (treated with retacrit), unknown cardiac arrhythmia (on amiodarone), bph, s/p stent graft to repair AAA in late Aug/early Sept at [facility] presented to ED from [facility] with sob and hypoxia found with high grade enterococcus bacteremia.    # Acute Hypoxic Respiratory Failure 2/2 right mainstem bronchus plugging  # Bilateral Pleural Effusions  pt admitted on 10/24 with sob and hypoxia  imaging revealed atelectasis of entire right lung 2/2 plugging of the right mainstem  also found with bilateral pleural effusion with partial right sided lung collapse  patient now s/p chest tube for pleural effusions, high flow oxygen therapy, aggressive pulmonary PT - now downgraded from ICU  currently on nasal canula, improved respiratory status  continue duonebs prn, symbicort  pulm following    # Sustained High Grade Enterococcus Bacteremia  ID following - patient with sustained bacteremia - 2 sets of 10/24 blood cultures with enterococcus and 2 sets of blood cultures on 10/27 then again on 10/29  sustained bacteremia suggestive of endovascular focus (pt is s/p stent graft to repair AAA)  CTA revealed stable appearance of aortic stent graft, type 2 endoleak inferior mesenteric artery  TTE no evidence of endocarditis  pleural fluid is transudative  plan to continue ampicillin and rocephin for extended enterococcal therapy day 6  continue furosemide 40 IV q 12  BRANDEN deferred for now as per cards  no support for graft infection at this time, outpatient follow up    # CAD  chronic condition - stable  pt was on asa and statin therapies at home  held during admission    # COPD  chronic condition  continue symbicort and nebs  pt completed course of steroids and zithromax as per ICU    # HTN  chronic condition  continue norvasc 10 daily - pt was on lopressor 25 bid at home  monitor BP    # Hypothyroid  chronic condition  continue levothyroxine 100 mcg daily    # BPH  chronic condition  continue flomax 0.4 mg daily    # Hypokalemia  K 3.0 today, plan to replete  follow up magnesium in am    # Unknown cardiac arrhythmia  continue amiodarone    # Anemia  chronic condition  s/p prbc's during this admission on 10/27  no signs of bleeding  monitor    # Prophylactic Measure  enoxaparin 40 daily           80 male with htn, copd, cad (s/p coronary stent placement), hypothyroid, ascending aortic aneurysm, anemia (treated with retacrit), unknown cardiac arrhythmia (on amiodarone), bph, s/p stent graft to repair AAA in late Aug/early Sept at [facility] presented to ED from [facility] with sob and hypoxia found with high grade enterococcus bacteremia.    # Acute Hypoxic Respiratory Failure 2/2 right mainstem bronchus plugging  # Bilateral Pleural Effusions  pt admitted on 10/24 with sob and hypoxia  imaging revealed atelectasis of entire right lung 2/2 plugging of the right mainstem  also found with bilateral pleural effusion with partial right sided lung collapse  patient now s/p chest tube for pleural effusions, high flow oxygen therapy, aggressive pulmonary PT - now downgraded from ICU  currently on nasal canula, improved respiratory status  continue duonebs prn, symbicort  pulm following    # Sustained High Grade Enterococcus Bacteremia  ID following - patient with sustained bacteremia - 2 sets of 10/24 blood cultures with enterococcus and 2 sets of blood cultures on 10/27 then again on 10/29  sustained bacteremia suggestive of endovascular focus (pt is s/p stent graft to repair AAA)  CTA revealed stable appearance of aortic stent graft, type 2 endoleak inferior mesenteric artery  TTE no evidence of endocarditis  pleural fluid is transudative  plan to continue ampicillin and rocephin for extended enterococcal therapy day 6  continue furosemide 40 IV q 12  BRANDEN deferred for now as per cards  no support for graft infection at this time, outpatient follow up    # CAD  chronic condition - stable  pt was on asa and statin therapies at home  held during admission    # COPD  chronic condition  continue symbicort and nebs  pt completed course of steroids and zithromax as per ICU    # HTN  chronic condition  continue norvasc 10 daily - pt was on lopressor 25 bid at home  monitor BP    # Hypothyroid  chronic condition  continue levothyroxine 100 mcg daily    # BPH  chronic condition  continue flomax 0.4 mg daily    # Hypokalemia  K 3.0 today, plan to replete  Mg 2.1 today    # Unknown cardiac arrhythmia  continue amiodarone    # Anemia  chronic condition  s/p prbc's during this admission on 10/27  no signs of bleeding  monitor    # Prophylactic Measure  enoxaparin 40 daily           80 male with htn, copd, cad (s/p coronary stent placement), hypothyroid, ascending aortic aneurysm, anemia (treated with retacrit), unknown cardiac arrhythmia (on amiodarone), bph, s/p stent graft to repair AAA in late Aug/early Sept at [facility] presented to ED from [facility] with sob and hypoxia found with high grade enterococcus bacteremia.    # Acute Hypoxic Respiratory Failure 2/2 right mainstem bronchus plugging  # Bilateral Pleural Effusions  pt admitted on 10/24 with sob and hypoxia  imaging revealed atelectasis of entire right lung 2/2 plugging of the right mainstem  also found with bilateral pleural effusion with partial right sided lung collapse  patient now s/p chest tube for pleural effusions, high flow oxygen therapy, aggressive pulmonary PT - now downgraded from ICU  currently on nasal canula, improved respiratory status  continue duonebs prn, symbicort  pulm following    # Sustained High Grade Enterococcus Bacteremia  ID following - patient with sustained bacteremia - 2 sets of 10/24 blood cultures with enterococcus and 2 sets of blood cultures on 10/27 then again on 10/29  sustained bacteremia suggestive of endovascular focus (pt is s/p stent graft to repair AAA)  CTA revealed stable appearance of aortic stent graft, type 2 endoleak inferior mesenteric artery  TTE no evidence of endocarditis  pleural fluid is transudative  plan to continue ampicillin and rocephin for extended enterococcal therapy day 6  continue furosemide 40 IV q 12  BRANDEN deferred for now as per cards  no support for graft infection at this time, outpatient follow up    # CAD  chronic condition - stable  pt was on asa and statin therapies at home  held during admission    # COPD  chronic condition  continue symbicort and nebs  pt completed course of steroids and zithromax as per ICU    # Chronic Combined CHF  TTE revealed mild to mod decreased systolic fxn, grade 1 DD, mod LVH, mild TR  continue furosemide 40 iv q 12  restart lopressor 25 bid with parameters if ok with cards  monitor fluid status    # HTN  chronic condition  continue norvasc 10 daily - restart lopressor if ok with cards  monitor BP    # Hypothyroid  chronic condition  continue levothyroxine 100 mcg daily    # BPH  chronic condition  continue flomax 0.4 mg daily    # Hypokalemia  K 3.0 today, plan to replete  Mg 2.1 today    # Unknown cardiac arrhythmia  continue amiodarone    # Anemia  chronic condition  s/p prbc's during this admission on 10/27  no signs of bleeding  monitor    # Prophylactic Measure  enoxaparin 40 daily

## 2020-10-31 NOTE — PROGRESS NOTE ADULT - SUBJECTIVE AND OBJECTIVE BOX
Follow up for cardiac issues  SUBJ: Patient much more alert today. denies cp or sob  PMH  Anemia    Cardiac arrhythmia    Anxiety    COPD (chronic obstructive pulmonary disease)    Aortic aneurysm    Heart disease    BPH (benign prostatic hyperplasia)    Hypertension        MEDICATIONS  (STANDING):  aMIOdarone    Tablet 200 milliGRAM(s) Oral daily  amLODIPine   Tablet 10 milliGRAM(s) Oral daily  ampicillin  IVPB 2 Gram(s) IV Intermittent every 6 hours  bisacodyl Suppository 10 milliGRAM(s) Rectal daily  budesonide 160 MICROgram(s)/formoterol 4.5 MICROgram(s) Inhaler 2 Puff(s) Inhalation two times a day  cefTRIAXone   IVPB 2000 milliGRAM(s) IV Intermittent every 12 hours  enoxaparin Injectable 40 milliGRAM(s) SubCutaneous daily  furosemide   Injectable 40 milliGRAM(s) IV Push every 12 hours  influenza   Vaccine 0.5 milliLiter(s) IntraMuscular once  levothyroxine 100 MICROGram(s) Oral daily  lidocaine   Patch 1 Patch Transdermal every 24 hours  pantoprazole    Tablet 40 milliGRAM(s) Oral before breakfast  potassium chloride    Tablet ER 40 milliEquivalent(s) Oral every 4 hours  senna 2 Tablet(s) Oral at bedtime  tamsulosin 0.4 milliGRAM(s) Oral two times a day    MEDICATIONS  (PRN):  acetaminophen   Tablet .. 650 milliGRAM(s) Oral every 6 hours PRN Temp greater or equal to 38C (100.4F), Mild Pain (1 - 3)  albuterol/ipratropium for Nebulization 3 milliLiter(s) Nebulizer every 6 hours PRN Shortness of Breath and/or Wheezing  ALPRAZolam 0.25 milliGRAM(s) Oral at bedtime PRN insomnia        PHYSICAL EXAM:  Vital Signs Last 24 Hrs  T(C): 36.8 (31 Oct 2020 08:39), Max: 36.8 (30 Oct 2020 12:00)  T(F): 98.2 (31 Oct 2020 08:39), Max: 98.2 (30 Oct 2020 12:00)  HR: 77 (31 Oct 2020 09:17) (74 - 85)  BP: 122/69 (31 Oct 2020 08:39) (104/56 - 122/69)  BP(mean): 73 (30 Oct 2020 17:00) (72 - 80)  RR: 18 (31 Oct 2020 08:39) (13 - 20)  SpO2: 97% (31 Oct 2020 09:17) (95% - 100%)    GENERAL: NAD, well-groomed, well-developed  HEAD:  Atraumatic, Normocephalic  EYES: EOMI, PERRLA, conjunctiva and sclera clear  ENT: Moist mucous membranes,  NECK: Supple, No JVD, no bruits  CHEST/LUNG: decreased BS bilaterally  HEART: Regular rate and rhythm; No murmurs, rubs, or gallops PMI non displaced.  ABDOMEN: Soft, Nontender, Nondistended; Bowel sounds present  EXTREMITIES:  2+ Peripheral Pulses, No clubbing, cyanosis, or edema  SKIN: No rashes or lesions  NERVOUS SYSTEM:  Alert & Oriented X3, Good concentration; Motor Strength 5/5 B/L upper and lower extremities; DTRs 2+ intact and symmetric      TELEMETRY:  ?sinus with apc's vs af heart rate 60-80        LABS:                        8.0    9.33  )-----------( 243      ( 31 Oct 2020 05:30 )             26.2     10-31    145  |  106  |  18  ----------------------------<  92  3.0<L>   |  30  |  1.24    Ca    8.7      31 Oct 2020 05:30  Phos  3.1     10-31  Mg     2.1     10-31              I&O's Summary    30 Oct 2020 07:01  -  31 Oct 2020 07:00  --------------------------------------------------------  IN: 600 mL / OUT: 2250 mL / NET: -1650 mL    31 Oct 2020 08:01  -  31 Oct 2020 11:56  --------------------------------------------------------  IN: 120 mL / OUT: 0 mL / NET: 120 mL      BNP    RADIOLOGY & ADDITIONAL STUDIES:cxr -bilateral effusions infiltrates    ECHO:

## 2020-10-31 NOTE — PROGRESS NOTE ADULT - SUBJECTIVE AND OBJECTIVE BOX
CC: f/u for    Patient reports    REVIEW OF SYSTEMS:  All other review of systems negative (Comprehensive ROS)    Antimicrobials Day #  :4  ampicillin  IVPB 2 Gram(s) IV Intermittent every 6 hours  cefTRIAXone   IVPB 2000 milliGRAM(s) IV Intermittent every 12 hours    Other Medications Reviewed    T(F): 98.2 (10-31-20 @ 08:39), Max: 98.2 (10-31-20 @ 08:39)  HR: 77 (10-31-20 @ 09:17)  BP: 122/69 (10-31-20 @ 08:39)  RR: 18 (10-31-20 @ 08:39)  SpO2: 97% (10-31-20 @ 09:17)  Wt(kg): --    PHYSICAL EXAM:  General: alert, no acute distress, very chronically ill  Eyes:  anicteric, no conjunctival injection, no discharge  Oropharynx: no lesions or injection 	  Neck: supple, without adenopathy  Lungs: grossly clear to auscultation  Heart: regular rate and rhythm; no murmur, rubs or gallops  Abdomen: soft, nondistended, nontender, without mass or organomegaly  Skin: no lesions  Extremities: no clubbing, cyanosis, or edema  Neurologic: alert, oriented, moves all extremities    LAB RESULTS:                        8.0    9.33  )-----------( 243      ( 31 Oct 2020 05:30 )             26.2     10-31    145  |  106  |  18  ----------------------------<  92  3.0<L>   |  30  |  1.24    Ca    8.7      31 Oct 2020 05:30  Phos  3.1     10-31  Mg     2.1     10-31          MICROBIOLOGY:  RECENT CULTURES:  10-29 @ 06:20 .Blood Blood-Peripheral Blood Culture PCR    Growth in anaerobic bottle: Enterococcus faecalis  Growth in aerobic bottle: Gram Positive Cocci in Pairs and Chains    Growth in anaerobic bottle: Gram Positive Cocci in Pairs and Chains  Growth in aerobic bottle: Gram Positive Cocci in Pairs and Chains    10-27 @ 12:15 Pleural Fl Pleural Fluid     No growth    polymorphonuclear leukocytes seen  No organisms seen  by cytocentrifuge    10-27 @ 05:20 .Blood Blood-Peripheral     Growth in aerobic and anaerobic bottles: Enterococcus faecalis  See previous culture [id number]    Growth in anaerobic bottle: Gram Positive Cocci in Pairs and Chains  Growth in aerobic bottle: Gram Positive Cocci in Pairs and Chains        RADIOLOGY REVIEWED:    d< from: CT Angio Abdomen and Pelvis w/ IV Cont (10.28.20 @ 11:02) >  EXAM:  CT ANGIO ABD PELV (W)AW IC    EXAM:  CT ANGIO CHEST (W)AW IC      PROCEDURE DATE:  10/28/2020        INTERPRETATION:  CT ANGIO CHEST WITHOUT AND OR WITH IV CONTRAST, CT ANGIO ABDOMEN AND PELVIS WITHOUT AND OR WITH IV CONTRAST    CLINICAL INFORMATION:  eval for aortic graft infection    TECHNIQUE: CT angiogram of the thoracoabdominal aorta and pelvic arteries was performed prior to and following the rapid infusion of 90 cc Omnipaque 350 intravenous contrast material. This study was performed using automatic exposure control (radiation dose reduction software) to obtain a diagnostic image quality scan with patient dose as low as reasonably achievable.    Image post processing, including MIP images, was performed.    COMPARISON: Chest CT 10/24/2020, CTA chest/abdomen/pelvis 9/18/2020    FINDINGS:    THORACIC AORTA: Moderate atherosclerotic plaque without dissection. Stable appearance of descending thoracoabdominal aortic stent graft. Normal proximal graft attachment site. No evidence of perigraft collection or gas to suggest graft infection. Widely patent branch vessels of the arch. Aortic measurements as follows:      Aortic root: 4.9 cm, previously 4.8 cm    Sinotubular junction: 3.2 cm, previously 3.2 cm    Mid-ascending aorta: 4.1 cm, previously 4.2 cm    Transverse aortic arch: 3.6 cm, previously 3.5 cm    Mid-descending aorta: 3.7 cm, previously 3.6 cm    Level of diaphragm: 6.2 cm, previously 6.5 cm    ABDOMINAL AORTA: Stable type II endoleak arising from the inferiormesenteric artery. Normal distal graft attachment sites. Stable excluded aneurysm sac diameter measuring 5.1 cm.    Celiac: Patent stent  SMA: Patent stent  DONITA: Patent  Renal Arteries: Patent left stent. Occluded right stent, unchanged.    ILIAC RUNOFF: Widely patent to the bilateral proximal femorals.    ADDITIONAL FINDINGS: The central airways are patent. Background emphysema. Improving right basilar consolidation with persistent additional patchy opacities throughout both lungs. Decreased size of moderate right hydropneumothorax with Pleurx catheter in place. Stable moderate layering left pleural effusion. Normal heart size. No pericardial effusion. Coronary atherosclerosis. Central pulmonary arteries are patent. No thoracic lymphadenopathy or hematoma.    The liver, spleen, pancreas, gallbladder, and biliary tree are normal. Atrophic right kidney. The remainder of the retroperitoneum is normal. Prostate radiation fiducial markers. Gil catheter in the bladder. Large fecal retention in the colon. Small ascites.    IMPRESSION:    Stable appearance of descending thoracoabdominal aortic stent graft. No evidence of perigraft collection or gas to suggest graft infection.    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 Stable excluded abdominal aortic aneurysm sac diameter measuring 5.1 cm.    Occluded right renal artery stent, unchanged.    Improving right basilar consolidation with persistent additional patchy opacities throughout both lungs. Findings are indicative of pneumonia.    Decreased size of moderate right hydropneumothorax with Pleurx catheter in place.        < from: Xray Chest 1 View- PORTABLE-Routine (10.31.20 @ 08:51) >  EXAM:  XR CHEST PORTABLE ROUTINE 1V      PROCEDURE DATE:  10/31/2020        INTERPRETATION:  Portable chest radiograph    CLINICAL INFORMATION:   Pleural effusion. Follow-up    TECHNIQUE:  Portable  AP view of the chest was obtained.    COMPARISON:10/30/2020 chest available for review.    FINDINGS: Image obtained in the steep RIGHT posterior oblique projection obscuring detailed assessment of RIGHT lung parenchyma. Repeat AP and lateral views recommended. There are bilateral pleural effusions.Is mild vascular congestion gross cardiomegaly. Status post TEVAR procedure.  /.    /Visualized osseous structures are intact.        IMPRESSION:   Suboptimal/radiograph for assessment of lung parenchyma.    Bilateral pleural effusions.  Cardiomegaly.  Suggest repeat straight AP and lateral radiographs.          < end of copied text >    < end of copied text >       from: TTE Echo Complete w/o Contrast w/ Doppler (10.25.20 @ 09:11) >   1. Mildly to moderately decreased global left ventricular systolic function.   2. Spectral Doppler shows impaired relaxation pattern of left ventricular myocardial filling (Grade I diastolic dysfunction).   3. There is moderate concentric left ventricular hypertrophy.   4. Dyskinetic apex.   5. Mildly increased RV wall thickness.   6. Possible mobile opacification in left atrium.   7. Mildly enlarged right atrium.   8. Moderate pleural effusion in both left andright lateral regions.   9. Mild thickening and calcification of the anterior and posterior mitral valve leaflets.  10. Mild tricuspid regurgitation.  11. Sclerotic aortic valve with decreased opening.  12. Dilatation of the aortic root.    < end of copied text >      Assessment:  80y male with a PMH of HTN, COPD, CAD, hypothyroidism, ascending aneurysm, BPH, s/p stent graft to repair AAA in late August /early September at [facility]   His Chest CT scan in September showed B/L effusions, RT>Lt, RUL atelectasis, endobronchial debris, and aortic graft stent with endoleak. S/p thoracentesis with fluid transudative. Was treated with Zosyn    Admitted on 10/24 with c/o SOB & started CTX and azithro at [facility]  He has multiple reasons for shortness of breath-effusions, atelectasis, and infiltrates.  Vancomycin added after blood cx recovered Enterococcus which is not a respiratory pathogen.  The organism is not growing in the urine, therefore raises concerns of endocarditis as well as graft infection. With his recent ? GI bleed, I am concerned about status of aneurysm and graft.  He has had a sustained bacteremia-2 sets of 10/24 blood cultures with enterococcus and 2 sets of blood cultures sent on 10/27 with enterococcus and 10/29  Sustained bacteremia suggestive of endovascular focus  TTE without evidence of endocarditis.  CTA of abd & pelvis revealed stable appearance of descending thoracoabdominal aortic stent graft. No evidence of perigraft collection or gas to suggest graft infection. 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   10/29 blood cultures are pending.  pleural fluid is not infected, appears to be in heart failure  doubt patient would tolerate felipa or valve surgery if something found or removal of any of his grafts.   today is day 4 of true synergistic tx and will repeat blood cx since 10/29 grew    Suggest:  1. Continue Amp/CTX for extended enterococcal therapy  2. Follow repeat blood cultures from today  to assess status of bacteremia  3. FELIPA would normally be advised, although I agree with cardiology that unless approach is altered by findings it may be deferred  4 goc discussion will be important. suspect endovascular infection and dont think he would survive aggressive interventions

## 2020-10-31 NOTE — PROGRESS NOTE ADULT - SUBJECTIVE AND OBJECTIVE BOX
Patient is a 80y old  Male who presents with a chief complaint of SOB/Hypoxia/Right lung white out/mucous plug (30 Oct 2020 12:09)    Patient seen and examined at bedside.  no acute overnight events    ALLERGIES:  No Known Allergies        Vital Signs Last 24 Hrs  T(F): 98.2 (31 Oct 2020 08:39), Max: 98.2 (30 Oct 2020 12:00)  HR: 77 (31 Oct 2020 09:17) (74 - 85)  BP: 122/69 (31 Oct 2020 08:39) (104/56 - 122/69)  RR: 18 (31 Oct 2020 08:39) (13 - 20)  SpO2: 97% (31 Oct 2020 09:17) (95% - 100%)  I&O's Summary    30 Oct 2020 07:01  -  31 Oct 2020 07:00  --------------------------------------------------------  IN: 600 mL / OUT: 2250 mL / NET: -1650 mL      MEDICATIONS:  acetaminophen   Tablet .. 650 milliGRAM(s) Oral every 6 hours PRN  albuterol/ipratropium for Nebulization 3 milliLiter(s) Nebulizer every 6 hours PRN  ALPRAZolam 0.25 milliGRAM(s) Oral at bedtime PRN  aMIOdarone    Tablet 200 milliGRAM(s) Oral daily  amLODIPine   Tablet 10 milliGRAM(s) Oral daily  ampicillin  IVPB 2 Gram(s) IV Intermittent every 6 hours  bisacodyl Suppository 10 milliGRAM(s) Rectal daily  budesonide 160 MICROgram(s)/formoterol 4.5 MICROgram(s) Inhaler 2 Puff(s) Inhalation two times a day  cefTRIAXone   IVPB 2000 milliGRAM(s) IV Intermittent every 12 hours  enoxaparin Injectable 40 milliGRAM(s) SubCutaneous daily  furosemide   Injectable 40 milliGRAM(s) IV Push every 12 hours  influenza   Vaccine 0.5 milliLiter(s) IntraMuscular once  levothyroxine 100 MICROGram(s) Oral daily  lidocaine   Patch 1 Patch Transdermal every 24 hours  pantoprazole    Tablet 40 milliGRAM(s) Oral before breakfast  senna 2 Tablet(s) Oral at bedtime  tamsulosin 0.4 milliGRAM(s) Oral two times a day      PHYSICAL EXAM:  General: NAD, Alert  ENT: MMM, no oral thrush  Neck: Supple, No JVD  Lungs: Decreased breath sounds at the bases, non labored breathing  Cardio: S1S2 regular  Abdomen: Soft, Nontender  Extremities: No cyanosis, No edema    LABS:                        8.0    9.33  )-----------( 243      ( 31 Oct 2020 05:30 )             26.2     10-31    145  |  106  |  18  ----------------------------<  92  3.0   |  30  |  1.24    Ca    8.7      31 Oct 2020 05:30  Phos  3.1     10-31  Mg     2.1     10-31    TPro  6.2  /  Alb  2.9  /  TBili  0.4  /  DBili  x   /  AST  11  /  ALT  18  /  AlkPhos  94  10-29    eGFR if Non African American: 55 mL/min/1.73M2 (10-31-20 @ 05:30)  eGFR if African American: 64 mL/min/1.73M2 (10-31-20 @ 05:30)                                  Culture - Blood (collected 29 Oct 2020 06:20)  Source: .Blood Blood-Venous  Gram Stain (31 Oct 2020 05:39):    Growth in aerobic bottle: Gram positive cocci in pairs  Preliminary Report (31 Oct 2020 05:39):    Growth in aerobic bottle: Gram positive cocci in pairs    "Due to technical problems, Proteus sp. will Not be reported as part of    the BCID panel until further notice"    ***Blood Panel PCR results on this specimen are available    approximately 3 hours after the Gram stain result.***    Gram stain, PCR, and/or culture results may not always    correspond due to difference in methodologies.    ************************************************************    This PCR assay was performed using [employer].    The following targets are tested for: Enterococcus,    vancomycin resistant enterococci, Listeria monocytogenes,    coagulase negative staphylococci, S. aureus,    methicillin resistant S. aureus, Streptococcus agalactiae    (Group B), S. pneumoniae, S. pyogenes (Group A),    Acinetobacter baumannii, Enterobacter cloacae, E. coli,    Klebsiella oxytoca, K. pneumoniae, Proteus sp.,    Serratia marcescens, Haemophilus influenzae,    Neisseria meningitidis, Pseudomonas aeruginosa, Candida    albicans, C. glabrata, C krusei, C parapsilosis,    C. tropicalis and the KPC resistance gene.  Organism: Blood Culture PCR (31 Oct 2020 06:58)  Organism: Blood Culture PCR (31 Oct 2020 06:58)      -  Enterococcus species: Detec      Method Type: PCR    Culture - Blood (collected 29 Oct 2020 06:20)  Source: .Blood Blood-Peripheral  Gram Stain (31 Oct 2020 04:51):    Growth in anaerobic bottle: Gram Positive Cocci in Pairs and Chains    Growth in aerobic bottle: Gram Positive Cocci in Pairs and Chains  Preliminary Report (31 Oct 2020 04:51):    Growth in anaerobic bottle: Gram Positive Cocci in Pairs and Chains    Growth in aerobic bottle: Gram Positive Cocci in Pairs and Chains    Culture - Fungal, Body Fluid (collected 27 Oct 2020 12:15)  Source: Pleural Fl Pleural Fluid  Preliminary Report (28 Oct 2020 08:13):    Testing in progress    Culture - Body Fluid with Gram Stain (collected 27 Oct 2020 12:15)  Source: Pleural Fl Pleural Fluid  Gram Stain (27 Oct 2020 23:20):    polymorphonuclear leukocytes seen    No organisms seen    by cytocentrifuge  Preliminary Report (28 Oct 2020 20:18):    No growth    Culture - Blood (collected 27 Oct 2020 05:20)  Source: .Blood Blood-Venous  Gram Stain (28 Oct 2020 02:51):    Growth in aerobic bottle: Gram Positive Cocci in Pairs and Chains    Growth in anaerobic bottle: Gram Positive Cocci in Pairs and Chains  Final Report (28 Oct 2020 21:15):    Growth in aerobic and anaerobic bottles: Enterococcus faecalis    See previous culture [id number]    Culture - Blood (collected 27 Oct 2020 05:20)  Source: .Blood Blood-Peripheral  Gram Stain (28 Oct 2020 03:37):    Growth in anaerobic bottle: Gram Positive Cocci in Pairs and Chains    Growth in aerobic bottle: Gram Positive Cocci in Pairs and Chains  Final Report (28 Oct 2020 22:37):    Growth in aerobic and anaerobic bottles: Enterococcus faecalis    See previous culture [id number]    Culture - Urine (collected 24 Oct 2020 22:15)  Source: .Urine Clean Catch (Midstream)  Final Report (26 Oct 2020 07:55):    <10,000 CFU/mL Normal Urogenital Tari    Culture - Blood (collected 24 Oct 2020 20:28)  Source: .Blood Blood-Peripheral  Gram Stain (25 Oct 2020 16:04):    Growth in aerobic and anaerobic bottles: Gram Positive Cocci in Pairs and    Chains  Final Report (27 Oct 2020 14:11):    Growth in aerobic and anaerobic bottles: Enterococcus faecalis    See previous culture [id number]    Culture - Blood (collected 24 Oct 2020 20:28)  Source: .Blood Blood-Peripheral  Gram Stain (25 Oct 2020 16:01):    Growth in anaerobic bottle: Gram Positive Cocci in Pairs and Chains    Growth in aerobic bottle: Gram Positive Cocci in Pairs and Chains  Final Report (27 Oct 2020 14:11):    Growth in aerobic and anaerobic bottles: Enterococcus faecalis    "Due to technical problems, Proteus sp. will Not be reported as part of    the BCID panel until further notice" ***Blood Panel PCR results on this    specimen are available    approximately 3 hours after the Gram stain result.***    Gram stain, PCR, and/or culture results may not always    correspond due to difference in methodologies.    ************************************************************    This PCR assay was performed using [employer].    The following targets are tested for: Enterococcus,    vancomycin resistant enterococci, Listeria monocytogenes,    coagulase negative staphylococci, S. aureus,    methicillin resistant S. aureus, Streptococcus agalactiae    (Group B), S. pneumoniae, S. pyogenes (Group A),    Acinetobacter baumannii, Enterobacter cloacae, E. coli,    Klebsiella oxytoca, K. pneumoniae, Proteus sp.,    Serratia marcescens, Haemophilus influenzae,    Neisseria meningitidis, Pseudomonas aeruginosa, Candida    albicans, C. glabrata, C krusei, C parapsilosis,    C. tropicalis and the KPC resistance gene.  Organism: Blood Culture PCR  Enterococcus faecalis (27 Oct 2020 14:11)  Organism: Enterococcus faecalis (27 Oct 2020 14:11)      -  Ampicillin: S <=2 Predicts results to ampicillin/sulbactam, amoxacillin-clavulanate and  piperacillin-tazobactam.      -  Gentamicin synergy: R      -  Vancomycin: S 2      Method Type: COLTEN  Organism: Blood Culture PCR (27 Oct 2020 14:11)      -  Enterococcus species: Detec      Method Type: PCR        RADIOLOGY & ADDITIONAL TESTS:    < from: CT Angio Abdomen and Pelvis w/ IV Cont (10.28.20 @ 11:02) >  IMPRESSION:    Stable appearance of descending thoracoabdominal aortic stent graft. No evidence of perigraft collection or gas to suggest graft infection.    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 Stable excluded abdominal aortic aneurysm sac diameter measuring 5.1 cm.    Occluded right renal artery stent, unchanged.    Improving right basilar consolidation with persistent additional patchy opacities throughout both lungs. Findings are indicative of pneumonia.    Decreased size of moderate right hydropneumothorax with Pleurx catheter in place.              MARYAM DANG MD; Attending Radiologist  This document has been electronically signed. Oct 28 2020  2:58PM    < end of copied text >      Care Discussed with Consultants/Other Providers:

## 2020-10-31 NOTE — PROGRESS NOTE ADULT - SUBJECTIVE AND OBJECTIVE BOX
Follow-up Critical Care Progress Note  Chief Complaint : Hypoxemia        patient appears to be at baseline  comfortable  no cp, sob, palp, n/v      Allergies :No Known Allergies      PAST MEDICAL & SURGICAL HISTORY:  Anemia    Cardiac arrhythmia    Anxiety    COPD (chronic obstructive pulmonary disease)    Aortic aneurysm    Heart disease  sp cardiac cath with stent placement    BPH (benign prostatic hyperplasia)    Hypertension    S/P coronary artery stent placement  1 stent- blocked artery        Medications:  MEDICATIONS  (STANDING):  aMIOdarone    Tablet 200 milliGRAM(s) Oral daily  amLODIPine   Tablet 10 milliGRAM(s) Oral daily  ampicillin  IVPB 2 Gram(s) IV Intermittent every 6 hours  bisacodyl Suppository 10 milliGRAM(s) Rectal daily  budesonide 160 MICROgram(s)/formoterol 4.5 MICROgram(s) Inhaler 2 Puff(s) Inhalation two times a day  cefTRIAXone   IVPB 2000 milliGRAM(s) IV Intermittent every 12 hours  enoxaparin Injectable 40 milliGRAM(s) SubCutaneous daily  furosemide   Injectable 40 milliGRAM(s) IV Push every 12 hours  influenza   Vaccine 0.5 milliLiter(s) IntraMuscular once  levothyroxine 100 MICROGram(s) Oral daily  lidocaine   Patch 1 Patch Transdermal every 24 hours  pantoprazole    Tablet 40 milliGRAM(s) Oral before breakfast  potassium chloride    Tablet ER 40 milliEquivalent(s) Oral every 4 hours  senna 2 Tablet(s) Oral at bedtime  tamsulosin 0.4 milliGRAM(s) Oral two times a day    MEDICATIONS  (PRN):  acetaminophen   Tablet .. 650 milliGRAM(s) Oral every 6 hours PRN Temp greater or equal to 38C (100.4F), Mild Pain (1 - 3)  albuterol/ipratropium for Nebulization 3 milliLiter(s) Nebulizer every 6 hours PRN Shortness of Breath and/or Wheezing  ALPRAZolam 0.25 milliGRAM(s) Oral at bedtime PRN insomnia    COVID  10-24-20 @ 21:00  COVID -   NotDetec  09-18-20 @ 16:30  COVID -   NotDetec      COVID Biomarkers    10-28-20 @ 06:00 ESR --  ---  CRP --  ---  DDimer  --   ---      ---   Ferritin --    09-22-20 @ 06:08 ESR --  ---  CRP --  ---  DDimer  --   ---      ---   Ferritin --          Covid 19 IgG ab Index/Interpretation 10-25-20 @ 05:00 0.08 / Negative  Covid 19 IgG ab Index/Interpretation 09-18-20 @ 20:45 <0.10 / Negative    Trend Cardiac Enzymes    Trend BNP  10-24-20 @ 20:28   -  3345<H>    Procalcitonin Trend    WBC Trend  10-31-20 @ 05:30   -  9.33  10-30-20 @ 05:15   -  10.78<H>  10-29-20 @ 06:15   -  9.58    H/H Trend  10-31-20 @ 05:30   -  8.0<L> / 26.2<L>  10-30-20 @ 05:15   -  8.8<L> / 28.6<L>  10-29-20 @ 06:15   -  8.8<L> / 28.3<L>    Platelet Trend  10-31-20 @ 05:30   -  243  10-30-20 @ 05:15   -  230  10-29-20 @ 06:15   -  293    Trend Sodium  10-31-20 @ 05:30   -  145  10-30-20 @ 05:15   -  139  10-29-20 @ 06:15   -  142    Trend Potassium  10-31-20 @ 05:30   -  3.0<L>  10-30-20 @ 05:15   -  3.3<L>  10-29-20 @ 06:15   -  3.4<L>    Trend Bun/Cr  10-31-20 @ 05:30  BUN/CR -  18 / 1.24  10-30-20 @ 05:15  BUN/CR -  17 / 1.19  10-29-20 @ 06:15  BUN/CR -  18 / 1.10    Lactic Acid Trend  10-24-20 @ 20:28   -   0.8    ABG Trend  10-25-20 @ 05:01   - 7.41/37/79/94  10-24-20 @ 20:30   - 7.41/39/56<L>/85<L>    Trend AST/ALT/ALK Phos/Bili  10-29-20 @ 06:15   11/18/94/0.4  10-27-20 @ 05:20   11/15/103/0.3  10-24-20 @ 20:28   28/20/140<H>/0.4  09-18-20 @ 11:34   24/27/101/0.4    Lights Criteria :    Trend LDH- Fluid  10-27-20 @ 12:15  75 -- --  09-21-20 @ 13:06  80 -- --      Trend LDH - Serum  10-28-20 @ 06:00  202 [50 - 242]  09-22-20 @ 06:08  217 [50 - 242]    _____________________  Trend Protein - Fluid   10-27-20 @ 12:15  2.6  --  --  09-21-20 @ 13:06  2.3  --  --      Trend Protein - Serum  10-29-20 @ 06:15  6.2 [6.0 - 8.3]  10-27-20 @ 05:20  6.6 [6.0 - 8.3]  10-24-20 @ 20:28  5.8<L> [6.0 - 8.3]  09-18-20 @ 11:34  6.7 [6.0 - 8.3]      _____________________  The effusion is exudative if one of the Light’s criteria has been met:   1)  Pleural fluid protein/serum protein ratio > 0.5.   2)  Pleural fluid LDH/serum LDH ratio greater than > 0.6.   3) Pleural fluid LDH level greater than 2/3 of upper limit of normal LDH level in serum.  _____________________ none has been met      Final Diagnosis   PLEURAL FLUID   NEGATIVE FOR MALIGNANT CELLS.       LABS:                        8.0    9.33  )-----------( 243      ( 31 Oct 2020 05:30 )             26.2     10-31    145  |  106  |  18  ----------------------------<  92  3.0<L>   |  30  |  1.24    Ca    8.7      31 Oct 2020 05:30  Phos  3.1     10-31  Mg     2.1     10-31                  CULTURES: (if applicable)    Culture - Blood (collected 10-29-20 @ 06:20)  Source: .Blood Blood-Venous  Gram Stain (10-31-20 @ 05:39):    Growth in aerobic bottle: Gram positive cocci in pairs  Preliminary Report (10-31-20 @ 05:39):    Growth in aerobic bottle: Gram positive cocci in pairs    "Due to technical problems, Proteus sp. will Not be reported as part of    the BCID panel until further notice"    ***Blood Panel PCR results on this specimen are available    approximately 3 hours after the Gram stain result.***    Gram stain, PCR, and/or culture results may not always    correspond due to difference in methodologies.    ************************************************************    This PCR assay was performed using [employer].    The following targets are tested for: Enterococcus,    vancomycin resistant enterococci, Listeria monocytogenes,    coagulase negative staphylococci, S. aureus,    methicillin resistant S. aureus, Streptococcus agalactiae    (Group B), S. pneumoniae, S. pyogenes (Group A),    Acinetobacter baumannii, Enterobacter cloacae, E. coli,    Klebsiella oxytoca, K. pneumoniae, Proteus sp.,    Serratia marcescens, Haemophilus influenzae,    Neisseria meningitidis, Pseudomonas aeruginosa, Candida    albicans, C. glabrata, C krusei, C parapsilosis,    C. tropicalis and the KPC resistance gene.  Organism: Blood Culture PCR (10-31-20 @ 06:58)  Organism: Blood Culture PCR (10-31-20 @ 06:58)      -  Enterococcus species: Detec      Method Type: PCR    Culture - Blood (collected 10-29-20 @ 06:20)  Source: .Blood Blood-Peripheral  Gram Stain (10-31-20 @ 04:51):    Growth in anaerobic bottle: Gram Positive Cocci in Pairs and Chains    Growth in aerobic bottle: Gram Positive Cocci in Pairs and Chains  Preliminary Report (10-31-20 @ 11:09):    Growth in anaerobic bottle: Enterococcus faecalis    Growth in aerobic bottle: Gram Positive Cocci in Pairs and Chains    Culture - Fungal, Body Fluid (collected 10-27-20 @ 12:15)  Source: Pleural Fl Pleural Fluid  Preliminary Report (10-28-20 @ 08:13):    Testing in progress    Culture - Body Fluid with Gram Stain (collected 10-27-20 @ 12:15)  Source: Pleural Fl Pleural Fluid  Gram Stain (10-27-20 @ 23:20):    polymorphonuclear leukocytes seen    No organisms seen    by cytocentrifuge  Preliminary Report (10-28-20 @ 20:18):    No growth    Culture - Blood (collected 10-27-20 @ 05:20)  Source: .Blood Blood-Venous  Gram Stain (10-28-20 @ 02:51):    Growth in aerobic bottle: Gram Positive Cocci in Pairs and Chains    Growth in anaerobic bottle: Gram Positive Cocci in Pairs and Chains  Final Report (10-28-20 @ 21:15):    Growth in aerobic and anaerobic bottles: Enterococcus faecalis    See previous culture [id number]    Culture - Blood (collected 10-27-20 @ 05:20)  Source: .Blood Blood-Peripheral  Gram Stain (10-28-20 @ 03:37):    Growth in anaerobic bottle: Gram Positive Cocci in Pairs and Chains    Growth in aerobic bottle: Gram Positive Cocci in Pairs and Chains  Final Report (10-28-20 @ 22:37):    Growth in aerobic and anaerobic bottles: Enterococcus faecalis    See previous culture [id number]    Culture - Urine (collected 10-24-20 @ 22:15)  Source: .Urine Clean Catch (Midstream)  Final Report (10-26-20 @ 07:55):    <10,000 CFU/mL Normal Urogenital Tari    Culture - Blood (collected 10-24-20 @ 20:28)  Source: .Blood Blood-Peripheral  Gram Stain (10-25-20 @ 16:04):    Growth in aerobic and anaerobic bottles: Gram Positive Cocci in Pairs and    Chains  Final Report (10-27-20 @ 14:11):    Growth in aerobic and anaerobic bottles: Enterococcus faecalis    See previous culture [id number]    Culture - Blood (collected 10-24-20 @ 20:28)  Source: .Blood Blood-Peripheral  Gram Stain (10-25-20 @ 16:01):    Growth in anaerobic bottle: Gram Positive Cocci in Pairs and Chains    Growth in aerobic bottle: Gram Positive Cocci in Pairs and Chains  Final Report (10-27-20 @ 14:11):    Growth in aerobic and anaerobic bottles: Enterococcus faecalis    "Due to technical problems, Proteus sp. will Not be reported as part of    the BCID panel until further notice" ***Blood Panel PCR results on this    specimen are available    approximately 3 hours after the Gram stain result.***    Gram stain, PCR, and/or culture results may not always    correspond due to difference in methodologies.    ************************************************************    This PCR assay was performed using [employer].    The following targets are tested for: Enterococcus,    vancomycin resistant enterococci, Listeria monocytogenes,    coagulase negative staphylococci, S. aureus,    methicillin resistant S. aureus, Streptococcus agalactiae    (Group B), S. pneumoniae, S. pyogenes (Group A),    Acinetobacter baumannii, Enterobacter cloacae, E. coli,    Klebsiella oxytoca, K. pneumoniae, Proteus sp.,    Serratia marcescens, Haemophilus influenzae,    Neisseria meningitidis, Pseudomonas aeruginosa, Candida    albicans, C. glabrata, C krusei, C parapsilosis,    C. tropicalis and the KPC resistance gene.  Organism: Blood Culture PCR  Enterococcus faecalis (10-27-20 @ 14:11)  Organism: Enterococcus faecalis (10-27-20 @ 14:11)      -  Ampicillin: S <=2 Predicts results to ampicillin/sulbactam, amoxacillin-clavulanate and  piperacillin-tazobactam.      -  Gentamicin synergy: R      -  Vancomycin: S 2      Method Type: COLTEN  Organism: Blood Culture PCR (10-27-20 @ 14:11)      -  Enterococcus species: Detec      Method Type: PCR            CAPILLARY BLOOD GLUCOSE          RADIOLOGY  CXR:    < from: Xray Chest 1 View- PORTABLE-Urgent (Xray Chest 1 View- PORTABLE-Urgent .) (10.30.20 @ 15:43) >  VIEWS: Portable AP radiography of the chest performed.    FINDINGS: Cardiac identified. Stent graft material is identified overlying the ascending thoracic aorta. No superior mediastinal widening is identified. Bilateral lung parenchymal airspace opacities and bilateral pleural effusions appear unchanged. There is no evidence for pneumothorax. No mediastinal shift is noted. No osseous fractures are identified.    IMPRESSION: As above.    < end of copied text >    ECHO:  < from: TTE Echo Complete w/o Contrast w/ Doppler (10.25.20 @ 09:11) >      Summary:   1. Mildly to moderately decreased global left ventricular systolic function.   2. Spectral Doppler shows impaired relaxation pattern of left ventricular myocardial filling (Grade I diastolic dysfunction).   3. There is moderate concentric left ventricular hypertrophy.   4. Dyskinetic apex.   5. Mildly increased RV wall thickness.   6. Possible mobile opacification in left atrium.   7. Mildly enlarged right atrium.   8. Moderate pleural effusion in both left andright lateral regions.   9. Mild thickening and calcification of the anterior and posterior mitral valve leaflets.  10. Mild tricuspid regurgitation.  11. Sclerotic aortic valve with decreased opening.  12. Dilatation of the aortic root.      < end of copied text >    VITALS:  T(C): 36.8 (10-31-20 @ 08:39), Max: 36.8 (10-30-20 @ 12:00)  T(F): 98.2 (10-31-20 @ 08:39), Max: 98.2 (10-30-20 @ 12:00)  HR: 77 (10-31-20 @ 09:17) (74 - 85)  BP: 122/69 (10-31-20 @ 08:39) (104/56 - 122/69)  BP(mean): 73 (10-30-20 @ 17:00) (72 - 80)  ABP: --  ABP(mean): --  RR: 18 (10-31-20 @ 08:39) (13 - 20)  SpO2: 97% (10-31-20 @ 09:17) (95% - 100%)  CVP(mm Hg): --  CVP(cm H2O): --    Ins and Outs     10-30-20 @ 07:01  -  10-31-20 @ 07:00  --------------------------------------------------------  IN: 600 mL / OUT: 2250 mL / NET: -1650 mL    10-31-20 @ 08:01  -  10-31-20 @ 11:30  --------------------------------------------------------  IN: 120 mL / OUT: 0 mL / NET: 120 mL                I&O's Detail    30 Oct 2020 07:01  -  31 Oct 2020 07:00  --------------------------------------------------------  IN:    IV PiggyBack: 200 mL    IV PiggyBack: 100 mL    IV PiggyBack: 300 mL  Total IN: 600 mL    OUT:    Indwelling Catheter - Urethral (mL): 2250 mL  Total OUT: 2250 mL    Total NET: -1650 mL      31 Oct 2020 08:01  -  31 Oct 2020 11:30  --------------------------------------------------------  IN:    Oral Fluid: 120 mL  Total IN: 120 mL    OUT:  Total OUT: 0 mL    Total NET: 120 mL

## 2020-10-31 NOTE — PROGRESS NOTE ADULT - ASSESSMENT
Imp    Elderly debilitated man with enterococcal sepsis.  clinically much improved    Suggest'  For now will get repeat ekg to assess for AF

## 2020-10-31 NOTE — PROVIDER CONTACT NOTE (CRITICAL VALUE NOTIFICATION) - SITUATION
EMMETT Sanchez notified of critical value of 10/29 BC result-- growth aerobic bottle gram positive cocci in pairs.

## 2020-10-31 NOTE — PROGRESS NOTE ADULT - ASSESSMENT
Physical Examination:  GENERAL:               Alert, Oriented, No acute distress.  frail male  HEENT:                   Symmetric. No JVD, Moist MM  PULM:                     Bilateral air entry, no wheezing, rales  CVS:                         S1, S2,  No Murmur  ABD:                        Soft, nondistended, nontender, normoactive bowel sounds,   EXT:                         No edema, nontender, No Cyanosis or Clubbing   Vascular:                Warm Extremities, Normal Capillary refill, Normal Distal Pulses  SKIN:                       Warm and well perfused, no rashes noted.   NEURO:                  Alert, oriented, interactive, follows commands  PSYC:                      Calm, + Insight.      Assessment:  1. Acute respiratory failure due to suspected volume overload state   2. Bilateral pleural effusion s/p Right pigtail chest tube - transudate x 2 with negative cytology x 2   3. COPD  4. Right upper lobe collapse   5. Coronary artery disease   6. Hypertension   7. Hypothyroidism  8. 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  - Monitor cxr every 2-3 days to monitor pl effusion, may need pleurex if medical therapy with diuretics bid not helping.   - Monitor respiratory status   - Cont. diuresis with albumin for hypoalbuminemia  - Supplement potassium  - cont. symbicort and nebs  - Supplement electrolytes  - Antibiotics per ID, repeat cultures remain positive from 10/27, Will discuss with ID about treating for possible endorcarditis with long term antibiotics.   - Oxygen support to maintain saturation > 90%  - Cont. thyroid medications and BPH medications  - Prognosis is guarded, given recurrent hospitalizations, coals of care discussion  - Remains with poor oral intake  - continue care on tele, consider palliative care eval and advance directives  - d/w Floor team

## 2020-11-01 NOTE — PROGRESS NOTE ADULT - SUBJECTIVE AND OBJECTIVE BOX
Follow up forcardiac issues  SUBJ: Patient is comfortable. denies cp or sob.  PMH  Anemia    Cardiac arrhythmia    Anxiety    COPD (chronic obstructive pulmonary disease)    Aortic aneurysm    Heart disease    BPH (benign prostatic hyperplasia)    Hypertension        MEDICATIONS  (STANDING):  aMIOdarone    Tablet 200 milliGRAM(s) Oral daily  amLODIPine   Tablet 10 milliGRAM(s) Oral daily  ampicillin  IVPB 2 Gram(s) IV Intermittent every 6 hours  bisacodyl Suppository 10 milliGRAM(s) Rectal daily  budesonide 160 MICROgram(s)/formoterol 4.5 MICROgram(s) Inhaler 2 Puff(s) Inhalation two times a day  cefTRIAXone   IVPB 2000 milliGRAM(s) IV Intermittent every 12 hours  enoxaparin Injectable 40 milliGRAM(s) SubCutaneous daily  furosemide   Injectable 40 milliGRAM(s) IV Push every 12 hours  influenza   Vaccine 0.5 milliLiter(s) IntraMuscular once  levothyroxine 100 MICROGram(s) Oral daily  lidocaine   Patch 1 Patch Transdermal every 24 hours  pantoprazole    Tablet 40 milliGRAM(s) Oral before breakfast  senna 2 Tablet(s) Oral at bedtime  tamsulosin 0.4 milliGRAM(s) Oral two times a day    MEDICATIONS  (PRN):  acetaminophen   Tablet .. 650 milliGRAM(s) Oral every 6 hours PRN Temp greater or equal to 38C (100.4F), Mild Pain (1 - 3)  albuterol/ipratropium for Nebulization 3 milliLiter(s) Nebulizer every 6 hours PRN Shortness of Breath and/or Wheezing  ALPRAZolam 0.25 milliGRAM(s) Oral at bedtime PRN insomnia        PHYSICAL EXAM:  Vital Signs Last 24 Hrs  T(C): 36.6 (01 Nov 2020 07:59), Max: 36.8 (01 Nov 2020 00:00)  T(F): 97.9 (01 Nov 2020 07:59), Max: 98.3 (01 Nov 2020 00:00)  HR: 72 (01 Nov 2020 09:44) (70 - 81)  BP: 120/70 (01 Nov 2020 07:59) (117/65 - 127/67)  BP(mean): --  RR: 13 (01 Nov 2020 07:59) (13 - 17)  SpO2: 97% (01 Nov 2020 09:44) (97% - 99%)    GENERAL: NAD, well-groomed, well-developed  HEAD:  Atraumatic, Normocephalic  EYES: EOMI, PERRLA, conjunctiva and sclera clear  ENT: Moist mucous membranes,  NECK: Supple, No JVD, no bruits  CHEST/LUNG: decreased bs bilaterally  HEART: Regular rate and rhythm; No murmurs, rubs, or gallops PMI non displaced.  ABDOMEN: Soft, Nontender, Nondistended; Bowel sounds present  EXTREMITIES:  2+ Peripheral Pulses, No clubbing, cyanosis, or edema  SKIN: No rashes or lesions  NERVOUS SYSTEM:  Alert & Oriented X3, Good concentration; Motor Strength 5/5 B/L upper and lower extremities; DTRs 2+ intact and symmetric      TELEMETRY:rsr ?af at times    ECG:    LABS:                        8.0    8.11  )-----------( 219      ( 01 Nov 2020 05:50 )             27.0     11-01    142  |  105  |  18  ----------------------------<  97  3.4<L>   |  30  |  1.26    Ca    9.0      01 Nov 2020 05:50  Phos  3.1     10-31  Mg     2.1     10-31              I&O's Summary    31 Oct 2020 08:01  -  01 Nov 2020 07:00  --------------------------------------------------------  IN: 360 mL / OUT: 1300 mL / NET: -940 mL    01 Nov 2020 07:01  -  01 Nov 2020 13:34  --------------------------------------------------------  IN: 100 mL / OUT: 0 mL / NET: 100 mL      BNP    RADIOLOGY & ADDITIONAL STUDIES:    ECHO:

## 2020-11-01 NOTE — PROGRESS NOTE ADULT - SUBJECTIVE AND OBJECTIVE BOX
Patient is a 80y old  Male who presents with a chief complaint of SOB/Hypoxia/Right lung white out/mucous plug (31 Oct 2020 15:11)    Patient seen and examined at bedside.  no acute events overnight    ALLERGIES:  No Known Allergies        Vital Signs Last 24 Hrs  T(F): 97.9 (01 Nov 2020 07:59), Max: 98.3 (01 Nov 2020 00:00)  HR: 74 (01 Nov 2020 07:59) (70 - 81)  BP: 120/70 (01 Nov 2020 07:59) (117/65 - 127/67)  RR: 13 (01 Nov 2020 07:59) (13 - 17)  SpO2: 99% (01 Nov 2020 07:59) (97% - 99%)  I&O's Summary    31 Oct 2020 08:01  -  01 Nov 2020 07:00  --------------------------------------------------------  IN: 360 mL / OUT: 1300 mL / NET: -940 mL      MEDICATIONS:  acetaminophen   Tablet .. 650 milliGRAM(s) Oral every 6 hours PRN  albuterol/ipratropium for Nebulization 3 milliLiter(s) Nebulizer every 6 hours PRN  ALPRAZolam 0.25 milliGRAM(s) Oral at bedtime PRN  aMIOdarone    Tablet 200 milliGRAM(s) Oral daily  amLODIPine   Tablet 10 milliGRAM(s) Oral daily  ampicillin  IVPB 2 Gram(s) IV Intermittent every 6 hours  bisacodyl Suppository 10 milliGRAM(s) Rectal daily  budesonide 160 MICROgram(s)/formoterol 4.5 MICROgram(s) Inhaler 2 Puff(s) Inhalation two times a day  cefTRIAXone   IVPB 2000 milliGRAM(s) IV Intermittent every 12 hours  enoxaparin Injectable 40 milliGRAM(s) SubCutaneous daily  furosemide   Injectable 40 milliGRAM(s) IV Push every 12 hours  influenza   Vaccine 0.5 milliLiter(s) IntraMuscular once  levothyroxine 100 MICROGram(s) Oral daily  lidocaine   Patch 1 Patch Transdermal every 24 hours  pantoprazole    Tablet 40 milliGRAM(s) Oral before breakfast  senna 2 Tablet(s) Oral at bedtime  tamsulosin 0.4 milliGRAM(s) Oral two times a day      PHYSICAL EXAM:  General: NAD, Alert  ENT: MMM, no oral thrush  Neck: Supple, No JVD  Lungs: Diminished at bases, non labored breathing  Cardio: S1S2 regular  Abdomen: Soft, Nontender  Extremities: No cyanosis, No edema    LABS:                        8.0    8.11  )-----------( 219      ( 01 Nov 2020 05:50 )             27.0     11-01    142  |  105  |  18  ----------------------------<  97  3.4   |  30  |  1.26    Ca    9.0      01 Nov 2020 05:50  Phos  3.1     10-31  Mg     2.1     10-31      eGFR if Non African American: 54 mL/min/1.73M2 (11-01-20 @ 05:50)  eGFR if : 63 mL/min/1.73M2 (11-01-20 @ 05:50)                                  Culture - Blood (collected 29 Oct 2020 06:20)  Source: .Blood Blood-Venous  Gram Stain (31 Oct 2020 12:47):    Growth in aerobic bottle: Gram positive cocci in pairs    Growth in anaerobic bottle: Gram positive cocci in pairs  Preliminary Report (31 Oct 2020 12:47):    Growth in aerobic bottle: Gram positive cocci in pairs    Growth in anaerobic bottle: Gram positive cocci in pairs    "Due to technical problems, Proteus sp. will Not be reported as part of    the BCID panel until further notice"    ***Blood Panel PCR results on this specimen are available    approximately 3 hours after the Gram stain result.***    Gram stain, PCR, and/or culture results may not always    correspond due to difference in methodologies.    ************************************************************    This PCR assay was performed using [employer].    The following targets are tested for: Enterococcus,    vancomycin resistant enterococci, Listeria monocytogenes,    coagulase negative staphylococci, S. aureus,    methicillin resistant S. aureus,Streptococcus agalactiae    (Group B), S. pneumoniae, S. pyogenes (Group A),    Acinetobacter baumannii, Enterobacter cloacae, E. coli,    Klebsiella oxytoca, K. pneumoniae, Proteus sp.,    Serratia marcescens, Haemophilus influenzae,    Neisseria meningitidis, Pseudomonas aeruginosa, Candida    albicans, C. glabrata, C krusei, C parapsilosis,    C. tropicalis and the KPC resistance gene.  Organism: Blood Culture PCR (31 Oct 2020 06:58)  Organism: Blood Culture PCR (31 Oct 2020 06:58)      -  Enterococcus species: Detec      Method Type: PCR    Culture - Blood (collected 29 Oct 2020 06:20)  Source: .Blood Blood-Peripheral  Gram Stain (31 Oct 2020 04:51):    Growth in anaerobic bottle: Gram Positive Cocci in Pairs and Chains    Growth in aerobic bottle: Gram Positive Cocci in Pairs and Chains  Preliminary Report (31 Oct 2020 11:09):    Growth in anaerobic bottle: Enterococcus faecalis    Growth in aerobic bottle: Gram Positive Cocci in Pairs and Chains    Culture - Fungal, Body Fluid (collected 27 Oct 2020 12:15)  Source: Pleural Fl Pleural Fluid  Preliminary Report (28 Oct 2020 08:13):    Testing in progress    Culture - Body Fluid with Gram Stain (collected 27 Oct 2020 12:15)  Source: Pleural Fl Pleural Fluid  Gram Stain (27 Oct 2020 23:20):    polymorphonuclear leukocytes seen    No organisms seen    by cytocentrifuge  Preliminary Report (28 Oct 2020 20:18):    No growth    Culture - Blood (collected 27 Oct 2020 05:20)  Source: .Blood Blood-Venous  Gram Stain (28 Oct 2020 02:51):    Growth in aerobic bottle: Gram Positive Cocci in Pairs and Chains    Growth in anaerobic bottle: Gram Positive Cocci in Pairs and Chains  Final Report (28 Oct 2020 21:15):    Growth in aerobic and anaerobic bottles: Enterococcus faecalis    See previous culture [id number]    Culture - Blood (collected 27 Oct 2020 05:20)  Source: .Blood Blood-Peripheral  Gram Stain (28 Oct 2020 03:37):    Growth in anaerobic bottle: Gram Positive Cocci in Pairs and Chains    Growth in aerobic bottle: Gram Positive Cocci in Pairs and Chains  Final Report (28 Oct 2020 22:37):    Growth in aerobic and anaerobic bottles: Enterococcus faecalis    See previous culture [id number]        RADIOLOGY & ADDITIONAL TESTS:  < from: Xray Chest 1 View- PORTABLE-Routine (10.31.20 @ 08:51) >  IMPRESSION:   Suboptimal/radiograph for assessment of lung parenchyma.    Bilateral pleural effusions.  Cardiomegaly.  Suggest repeat straight AP and lateral radiographs.                    KISHA TELLES MD; Attending Radiologist  This document has been electronically signed. Oct 31 2020 12:02PM    < end of copied text >        Care Discussed with Consultants/Other Providers:

## 2020-11-01 NOTE — PROGRESS NOTE ADULT - ASSESSMENT
Physical Examination:  GENERAL:               Alert, Oriented, No acute distress.  frail male  HEENT:                   Symmetric. No JVD, Moist MM  PULM:                     Bilateral air entry, no wheezing, rales  CVS:                         S1, S2,  No Murmur  ABD:                        Soft, nondistended, nontender, normoactive bowel sounds,   EXT:                         No edema, nontender, No Cyanosis or Clubbing   Vascular:                Warm Extremities, Normal Capillary refill, Normal Distal Pulses  SKIN:                       Warm and well perfused, no rashes noted.   NEURO:                  Alert, oriented, interactive, follows commands  PSYC:                      Calm, + Insight.      Assessment:  1. Acute respiratory failure due to suspected volume overload state   2. Bilateral pleural effusion s/p Right pigtail chest tube - transudate x 2 with negative cytology x 2   3. COPD  4. Right upper lobe collapse   5. Coronary artery disease   6. Hypertension   7. Hypothyroidism  8. Bacteremia - enterococcus faecalis with LA mobile mass at risk for endocarditis     Plan  - Echo showing moderate LV dysfunction though no EF reported, also noted with left atrial mobile mass  - Abx as per ID, as cultures remain positive  - d/w Dr. staton and dr. Kumar today patient may benefit evaluation back at [place] for further evaluation of  endocarditis, and consideration for b/l pleurex cath once infection improves.   - Cont. diuresis with albumin for hypoalbuminemia  - cont. symbicort and nebs  - Supplement electrolytes  - Oxygen support to maintain saturation > 90%  - Cont. thyroid medications and BPH medications  - Prognosis is guarded, given recurrent hospitalizations, coals of care discussion  - Remains with poor oral intake  - continue care on tele, consider palliative care eval and advance directives  - will follow for pulmonary

## 2020-11-01 NOTE — PROGRESS NOTE ADULT - SUBJECTIVE AND OBJECTIVE BOX
CC: f/u for  enterococcemia  Patient reports he is ok    REVIEW OF SYSTEMS:  All other review of systems negative (Comprehensive ROS)    Antimicrobials Day #  :7  ampicillin  IVPB 2 Gram(s) IV Intermittent every 6 hours  cefTRIAXone   IVPB 2000 milliGRAM(s) IV Intermittent every 12 hours    Other Medications Reviewed    T(F): 97.6 (11-01-20 @ 20:00), Max: 98.6 (11-01-20 @ 16:05)  HR: 78 (11-01-20 @ 20:00)  BP: 118/66 (11-01-20 @ 20:00)  RR: 17 (11-01-20 @ 20:00)  SpO2: 98% (11-01-20 @ 20:00)  Wt(kg): --    PHYSICAL EXAM:  General: alert, no acute distress  Eyes:  anicteric, no conjunctival injection, no discharge  Oropharynx: no lesions or injection 	  Neck: supple, without adenopathy  Lungs: decreased bases to auscultation  Heart: regular rate and rhythm; 2/6 sys m  Abdomen: soft, nondistended, nontender, without mass or organomegaly  Skin: no lesions  Extremities: no clubbing, cyanosis, or edema  Neurologic: alert, moves all extremities    LAB RESULTS:                        8.0    8.11  )-----------( 219      ( 01 Nov 2020 05:50 )             27.0     11-01    142  |  105  |  18  ----------------------------<  97  3.4<L>   |  30  |  1.26    Ca    9.0      01 Nov 2020 05:50  Phos  3.1     10-31  Mg     2.1     10-31          MICROBIOLOGY:  RECENT CULTURES:  10-29 @ 06:20 .Blood Blood-Peripheral Enterococcus faecalis    Growth in aerobic and anaerobic bottles: Enterococcus faecalis    Growth in anaerobic bottle: Gram Positive Cocci in Pairs and Chains  Growth in aerobic bottle: Gram Positive Cocci in Pairs and Chains        RADIOLOGY REVIEWED:    < from: TTE Echo Complete w/o Contrast w/ Doppler (10.25.20 @ 09:11) >  EXAM:  ECHO TTE WO CON COMP W DOPP      PROCEDURE DATE:  10/25/2020        INTERPRETATION:  REPORT:  TRANSTHORACIC ECHOCARDIOGRAM REPORT        Patient Name:   JEREMIAH GAMBINO Patient Location: [facility]  Medical Rec #:  [id number]      Accession #:      [id number]  Account #:      [id number]       Height:           72.0 in 182.9 cm  YOB: 1940     Weight:           138.0 lb 62.60 kg  Patient Age:    80 years      BSA:              1.82 m²  Patient Gender: M             BP:               124/70 mmHg      Date of Exam:        10/25/2020 9:11:44 AM  Sonographer:         KIRILL  Referring Physician: FADUMO    Procedure:     2D Echo/Doppler/Color Doppler Complete.  Indications:   Heart failure, unspecified - I50.9  Diagnosis:     short of breath  Study Details: Technically limited study. Study quality was adversely affected                 due to body habitus and no apical windows.        2D AND M-MODE MEASUREMENTS (normal ranges within parentheses):  Left Ventricle:                  Normal   Aorta/Left Atrium:             Normal  IVSd (2D):              2.18 cm (0.7-1.1) Aortic Root (2D):    4.10 cm (2.4-3.7)  LVPWd (2D):             1.30 cm (0.7-1.1) Aortic Root (Mmode): 3.36 cm (2.4-3.7)  LVIDd (2D):             5.15 cm (3.4-5.7) AoV Cusp Separation: 1.25 cm (1.5-2.6)  LVIDs (2D):             2.92 cm           Left Atrium (Mmode): 4.85 cm (1.9-4.0)  LV FS (2D):             43.3 %   (>25%)  Relative Wall Thickness  0.51    (<0.42)    LV DIASTOLIC FUNCTION:  MV Peak E: 0.73 m/s Decel Time: 124 msec  MV Peak A: 1.06 m/s  E/A Ratio: 0.69    SPECTRAL DOPPLER ANALYSIS (where applicable):  Mitral Valve:  MV P1/2 Time: 36.05 msec  MV Area, PHT: 6.10 cm²    LVOT Vmax:  LVOT VTI:  LVOT Diameter: 2.32 cm    Tricuspid Valve and PA/RV Systolic Pressure: TR Max Velocity: 2.30 m/s RA Pressure: 10 mmHg RVSP/PASP: 31.1 mmHg      PHYSICIAN INTERPRETATION:  Left Ventricle:  Global LV systolic function was mildly to moderately decreased. Spectral Doppler shows impaired relaxation pattern of left ventricular myocardial filling (Grade I diastolic dysfunction). Dyskinetic apex.  Right Ventricle: The right ventricular size is normal. RV wall thickness is mildly increased.  Left Atrium: The left atrium was not well visualized. Possible mobile opacification in left atrium.  Right Atrium: Mildly enlarged right atrium. Abnormal wall motion due to effusion.  Pericardium: A small pericardial effusion is present. Possible mass inside pleural effusion. There is a moderate pleural effusion in both left and right lateral regions.  Mitral Valve: Mild thickening and calcification of the anterior and posterior mitral valve leaflets.  Tricuspid Valve: Mild tricuspid regurgitation is visualized.  Aortic Valve: The aortic valve is trileaflet. Scleroticaortic valve with decreased opening. Trivial aortic valve regurgitation is seen.  Pulmonic Valve: Structurally normal pulmonic valve, with normal leaflet excursion.  Aorta: There is dilatation of the aortic root.  Pulmonary Artery: The pulmonary artery is not well seen.      Summary:   1. Mildly to moderately decreased global left ventricular systolic function.   2. Spectral Doppler shows impaired relaxation pattern of left ventricular myocardial filling (Grade I diastolic dysfunction).   3. There is moderate concentric left ventricular hypertrophy.   4. Dyskinetic apex.   5. Mildly increased RV wall thickness.   6. Possible mobile opacification in left atrium.   7. Mildly enlarged right atrium.   8. Moderate pleural effusion in both left andright lateral regions.   9. Mild thickening and calcification of the anterior and posterior mitral valve leaflets.  10. Mild tricuspid regurgitation.  11. Sclerotic aortic valve with decreased opening.  12. Dilatation of the aortic root.    [id number] Terry Wilson MD, [facility] , Electronically signed on 10/25/2020 at 12:56:22 PM    < end of copied text >    < from: CT Angio Abdomen and Pelvis w/ IV Cont (10.28.20 @ 11:02) >  EXAM:  CT ANGIO ABD PELV (W)AW IC    EXAM:  CT ANGIO CHEST (W)AW IC      PROCEDURE DATE:  10/28/2020        INTERPRETATION:  CT ANGIO CHEST WITHOUT AND OR WITH IV CONTRAST, CT ANGIO ABDOMEN AND PELVIS WITHOUT AND OR WITH IV CONTRAST    CLINICAL INFORMATION:  eval for aortic graft infection    TECHNIQUE: CT angiogram of the thoracoabdominal aorta and pelvic arteries was performed prior to and following the rapid infusion of 90 cc Omnipaque 350 intravenous contrast material. This study was performed using automatic exposure control (radiation dose reduction software) to obtain a diagnostic image quality scan with patient dose as low as reasonably achievable.    Image post processing, including MIP images, was performed.    COMPARISON: Chest CT 10/24/2020, CTA chest/abdomen/pelvis 9/18/2020    FINDINGS:    THORACIC AORTA: Moderate atherosclerotic plaque without dissection. Stable appearance of descending thoracoabdominal aortic stent graft. Normal proximal graft attachment site. No evidence of perigraft collection or gas to suggest graft infection. Widely patent branch vessels of the arch. Aortic measurements as follows:      Aortic root: 4.9 cm, previously 4.8 cm    Sinotubular junction: 3.2 cm, previously 3.2 cm    Mid-ascending aorta: 4.1 cm, previously 4.2 cm    Transverse aortic arch: 3.6 cm, previously 3.5 cm    Mid-descending aorta: 3.7 cm, previously 3.6 cm    Level of diaphragm: 6.2 cm, previously 6.5 cm    ABDOMINAL AORTA: Stable type II endoleak arising from the inferiormesenteric artery. Normal distal graft attachment sites. Stable excluded aneurysm sac diameter measuring 5.1 cm.    Celiac: Patent stent  SMA: Patent stent  DONITA: Patent  Renal Arteries: Patent left stent. Occluded right stent, unchanged.    ILIAC RUNOFF: Widely patent to the bilateral proximal femorals.    ADDITIONAL FINDINGS: The central airways are patent. Background emphysema. Improving right basilar consolidation with persistent additional patchy opacities throughout both lungs. Decreased size of moderate right hydropneumothorax with Pleurx catheter in place. Stable moderate layering left pleural effusion. Normal heart size. No pericardial effusion. Coronary atherosclerosis. Central pulmonary arteries are patent. No thoracic lymphadenopathy or hematoma.    The liver, spleen, pancreas, gallbladder, and biliary tree are normal. Atrophic right kidney. The remainder of the retroperitoneum is normal. Prostate radiation fiducial markers. Gil catheter in the bladder. Large fecal retention in the colon. Small ascites.    IMPRESSION:    Stable appearance of descending thoracoabdominal aortic stent graft. No evidence of perigraft collection or gas to suggest graft infection.    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 Stable excluded abdominal aortic aneurysm sac diameter measuring 5.1 cm.    Occluded right renal artery stent, unchanged.    Improving right basilar consolidation with persistent additional patchy opacities throughout both lungs. Findings are indicative of pneumonia.    Decreased size of moderate right hydropneumothorax with Pleurx catheter in place.          < end of copied text >          Assessment:  Elderly man with AAA graft/stent admitted with collapses lung with plug now resolved, right pleurex , found to have high grade sustained enterococcemia which is not controlled yet. Suspect endovascular infection, high risk for procedures.   Plan:  continue ctx and amp  favor he transfer to [facility] to further evaluate his various stents and see if felipa should be done   f/u latest blood cultures

## 2020-11-01 NOTE — PROGRESS NOTE ADULT - ASSESSMENT
Imp    PATIENT WITH MULTIPLE MEDICAL PROBLEMS.  CULTURE PERSISTENTLY POSITIVE ACCRODING TO MEDICAL RECORDS.  BEING CONSIDERED FOR TRANSFER TO HIGHER LEVEL OF CARE    FABIOLA FOLLOW AS NEEDED

## 2020-11-01 NOTE — PROGRESS NOTE ADULT - ATTENDING COMMENTS
enterococcus in blood  patient is a highr risk for BRANDEN due to debilitated status and risks of intubation. patient is currently aspirating even on casual exam and BRANDEN bryanna pose an increased risk. risks  vs benefits reviewed with Dr Johnson.
sepsis  consideration was raised wrt a left atrial mass. I have reviewed the echo and endocarditis is a remote consideration. patient currently being treated for same. patient is high risk for BRANDEN.
I have personally seen and examined patient on the above date.  I discussed the case with Caio NP and I agree with findings and plan as detailed per note above, which I have amended where appropriate.    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  Case discussed with Dr. Ni and Dr. Wall. Pt may benefit from continuity of care at primary facility where his invasive procedures were done.  Attempting to reach out to Dr. Benoit Carmona, pt's vascular surgeon ([phone]) for continuity of care and possible transfer.  Attempted to reach patient's daughter unsuccessfully for GOC conversation.
I have personally seen and examined patient on the above date.  I discussed the case with Caio NP and I agree with findings and plan as detailed per note above, which I have amended where appropriate.    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

## 2020-11-01 NOTE — PROGRESS NOTE ADULT - ASSESSMENT
80 male with htn, copd, cad (s/p coronary stent placement), hypothyroid, ascending aortic aneurysm, anemia (treated with retacrit), unknown cardiac arrhythmia (on amiodarone), bph, s/p stent graft to repair AAA in late Aug/early Sept at [facility] presented to ED from [facility] with sob and hypoxia found with high grade enterococcus bacteremia.    # Acute Hypoxic Respiratory Failure 2/2 right mainstem bronchus plugging  # Bilateral Pleural Effusions  pt admitted on 10/24 with sob and hypoxia  imaging revealed atelectasis of entire right lung 2/2 plugging of the right mainstem  also found with bilateral pleural effusion with partial right sided lung collapse  patient now s/p chest tube for pleural effusions, high flow oxygen therapy, aggressive pulmonary PT - now downgraded from ICU  currently on nasal canula, improved respiratory status  pulm following - repeat cxr on 10/31 reveals pleural effusions and cardiomegaly, recommend straight AP/Lateral  continue serial cxr to evaluate pleural effusions may need pleurex if diuretics not enough  continue furosemide 40 IV q 12, albumin for hypoalbuminemia  maintain oxygen support as needed  continue symbicort, nebs    # Sustained High Grade Enterococcus Bacteremia  ID following - patient with sustained bacteremia - 2 sets of 10/24 blood cultures with enterococcus and 2 sets of blood cultures on 10/27 then again on 10/29  sustained bacteremia suggestive of endovascular focus (pt is s/p stent graft to repair AAA)  CTA revealed stable appearance of aortic stent graft, type 2 endoleak inferior mesenteric artery  TTE no evidence of endocarditis  pleural fluid is transudative x2, cytology negative x 2  plan to continue ampicillin and rocephin for extended enterococcal therapy day 7  BRANDEN deferred for now as per cards  no support for graft infection at this time, outpatient follow up  GOC discussion, as patient may not be ideal candidate for invasive measures  palliative consult    # CAD  chronic condition - stable  pt was on asa and statin therapies at home  held during admission    # COPD  chronic condition  continue symbicort and nebs  pt completed course of steroids and zithromax as per ICU    # Chronic Combined CHF  TTE revealed mild to mod decreased systolic fxn, grade 1 DD, mod LVH, mild TR  continue furosemide 40 iv q 12  restart lopressor 25 bid with parameters if ok with cards  monitor fluid status    # HTN  chronic condition  continue norvasc 10 daily - restart lopressor if ok with cards  monitor BP    # Hypothyroid  chronic condition  continue levothyroxine 100 mcg daily    # BPH  chronic condition  continue flomax 0.4 mg daily    # Hypokalemia  replete as needed, follow up bmp  Mg 2.1 today    # Unknown cardiac arrhythmia  continue amiodarone    # Anemia  chronic condition  s/p prbc's during this admission on 10/27  no signs of bleeding  monitor    # Prophylactic Measure  enoxaparin 40 daily    # GOC  plan to reach out to family today to discuss  palliative consult

## 2020-11-01 NOTE — PROGRESS NOTE ADULT - SUBJECTIVE AND OBJECTIVE BOX
Follow-up Critical Care Progress Note  Chief Complaint : Hypoxemia        pt feels well  is confused about medical course  no cp, sob, palp        Allergies :No Known Allergies      PAST MEDICAL & SURGICAL HISTORY:  Anemia    Cardiac arrhythmia    Anxiety    COPD (chronic obstructive pulmonary disease)    Aortic aneurysm    Heart disease  sp cardiac cath with stent placement    BPH (benign prostatic hyperplasia)    Hypertension    S/P coronary artery stent placement  1 stent- blocked artery        Medications:  MEDICATIONS  (STANDING):  aMIOdarone    Tablet 200 milliGRAM(s) Oral daily  amLODIPine   Tablet 10 milliGRAM(s) Oral daily  ampicillin  IVPB 2 Gram(s) IV Intermittent every 6 hours  bisacodyl Suppository 10 milliGRAM(s) Rectal daily  budesonide 160 MICROgram(s)/formoterol 4.5 MICROgram(s) Inhaler 2 Puff(s) Inhalation two times a day  cefTRIAXone   IVPB 2000 milliGRAM(s) IV Intermittent every 12 hours  enoxaparin Injectable 40 milliGRAM(s) SubCutaneous daily  furosemide   Injectable 40 milliGRAM(s) IV Push every 12 hours  influenza   Vaccine 0.5 milliLiter(s) IntraMuscular once  levothyroxine 100 MICROGram(s) Oral daily  lidocaine   Patch 1 Patch Transdermal every 24 hours  pantoprazole    Tablet 40 milliGRAM(s) Oral before breakfast  senna 2 Tablet(s) Oral at bedtime  tamsulosin 0.4 milliGRAM(s) Oral two times a day    MEDICATIONS  (PRN):  acetaminophen   Tablet .. 650 milliGRAM(s) Oral every 6 hours PRN Temp greater or equal to 38C (100.4F), Mild Pain (1 - 3)  albuterol/ipratropium for Nebulization 3 milliLiter(s) Nebulizer every 6 hours PRN Shortness of Breath and/or Wheezing  ALPRAZolam 0.25 milliGRAM(s) Oral at bedtime PRN insomnia    COVID  10-24-20 @ 21:00  COVID -   NotDetec  09-18-20 @ 16:30  COVID -   NotDetec      COVID Biomarkers    10-28-20 @ 06:00 ESR --  ---  CRP --  ---  DDimer  --   ---      ---   Ferritin --    09-22-20 @ 06:08 ESR --  ---  CRP --  ---  DDimer  --   ---      ---   Ferritin --          Covid 19 IgG ab Index/Interpretation 10-25-20 @ 05:00 0.08 / Negative  Covid 19 IgG ab Index/Interpretation 09-18-20 @ 20:45 <0.10 / Negative  LABS:                        8.0    8.11  )-----------( 219      ( 01 Nov 2020 05:50 )             27.0     11-01    142  |  105  |  18  ----------------------------<  97  3.4<L>   |  30  |  1.26    Ca    9.0      01 Nov 2020 05:50  Phos  3.1     10-31  Mg     2.1     10-31      Fluid characteristics  -- 10-27 @ 12:15  pH 7.8  LDH 75  tprot 2.6    Cell count  Appearance Slightly Cloudy  Fluid type --  BF lymph 15  color Yellow  eosinophil --  PMN 20  Mesothelial --  Monocyte 65  Other body cells --                      CULTURES: (if applicable)    Culture - Blood (collected 10-29-20 @ 06:20)  Source: .Blood Blood-Venous  Gram Stain (10-31-20 @ 12:47):    Growth in aerobic bottle: Gram positive cocci in pairs    Growth in anaerobic bottle: Gram positive cocci in pairs  Preliminary Report (11-01-20 @ 11:28):    Growth in aerobic and anaerobic bottles: Enterococcus faecalis    Susceptibility to follow.    "Due to technical problems, Proteus sp. will Not be reported as part of    the BCID panel until further notice"    ***Blood Panel PCR results on this specimen are available    approximately 3 hours after the Gram stain result.***    Gram stain, PCR, and/or culture results may not always    correspond due to difference in methodologies.    ************************************************************    This PCR assay was performed using [employer].    The following targets are tested for: Enterococcus,    vancomycin resistant enterococci, Listeria monocytogenes,    coagulase negative staphylococci, S. aureus,    methicillin resistant S. aureus, Streptococcus agalactiae    (Group B), S. pneumoniae, S. pyogenes (Group A),    Acinetobacter baumannii, Enterobacter cloacae, E. coli,    Klebsiella oxytoca, K. pneumoniae, Proteus sp.,    Serratia marcescens, Haemophilus influenzae,    Neisseria meningitidis, Pseudomonas aeruginosa, Candida    albicans, C. glabrata, C krusei, C parapsilosis,    C. tropicalis and the KPC resistance gene.  Organism: Blood Culture PCR (10-31-20 @ 06:58)  Organism: Blood Culture PCR (10-31-20 @ 06:58)      -  Enterococcus species: Detec      Method Type: PCR    Culture - Blood (collected 10-29-20 @ 06:20)  Source: .Blood Blood-Peripheral  Gram Stain (10-31-20 @ 04:51):    Growth in anaerobic bottle: Gram Positive Cocci in Pairs and Chains    Growth in aerobic bottle: Gram Positive Cocci in Pairs and Chains  Preliminary Report (10-31-20 @ 11:09):    Growth in anaerobic bottle: Enterococcus faecalis    Growth in aerobic bottle: Gram Positive Cocci in Pairs and Chains    Culture - Fungal, Body Fluid (collected 10-27-20 @ 12:15)  Source: Pleural Fl Pleural Fluid  Preliminary Report (10-28-20 @ 08:13):    Testing in progress    Culture - Body Fluid with Gram Stain (collected 10-27-20 @ 12:15)  Source: Pleural Fl Pleural Fluid  Gram Stain (10-27-20 @ 23:20):    polymorphonuclear leukocytes seen    No organisms seen    by cytocentrifuge  Preliminary Report (10-28-20 @ 20:18):    No growth    Culture - Blood (collected 10-27-20 @ 05:20)  Source: .Blood Blood-Venous  Gram Stain (10-28-20 @ 02:51):    Growth in aerobic bottle: Gram Positive Cocci in Pairs and Chains    Growth in anaerobic bottle: Gram Positive Cocci in Pairs and Chains  Final Report (10-28-20 @ 21:15):    Growth in aerobic and anaerobic bottles: Enterococcus faecalis    See previous culture [id number]    Culture - Blood (collected 10-27-20 @ 05:20)  Source: .Blood Blood-Peripheral  Gram Stain (10-28-20 @ 03:37):    Growth in anaerobic bottle: Gram Positive Cocci in Pairs and Chains    Growth in aerobic bottle: Gram Positive Cocci in Pairs and Chains  Final Report (10-28-20 @ 22:37):    Growth in aerobic and anaerobic bottles: Enterococcus faecalis    See previous culture [id number]    Culture - Urine (collected 10-24-20 @ 22:15)  Source: .Urine Clean Catch (Midstream)  Final Report (10-26-20 @ 07:55):    <10,000 CFU/mL Normal Urogenital Tari    Culture - Blood (collected 10-24-20 @ 20:28)  Source: .Blood Blood-Peripheral  Gram Stain (10-25-20 @ 16:04):    Growth in aerobic and anaerobic bottles: Gram Positive Cocci in Pairs and    Chains  Final Report (10-27-20 @ 14:11):    Growth in aerobic and anaerobic bottles: Enterococcus faecalis    See previous culture [id number]    Culture - Blood (collected 10-24-20 @ 20:28)  Source: .Blood Blood-Peripheral  Gram Stain (10-25-20 @ 16:01):    Growth in anaerobic bottle: Gram Positive Cocci in Pairs and Chains    Growth in aerobic bottle: Gram Positive Cocci in Pairs and Chains  Final Report (10-27-20 @ 14:11):    Growth in aerobic and anaerobic bottles: Enterococcus faecalis    "Due to technical problems, Proteus sp. will Not be reported as part of    the BCID panel until further notice" ***Blood Panel PCR results on this    specimen are available    approximately 3 hours after the Gram stain result.***    Gram stain, PCR, and/or culture results may not always    correspond due to difference in methodologies.    ************************************************************    This PCR assay was performed using [employer].    The following targets are tested for: Enterococcus,    vancomycin resistant enterococci, Listeria monocytogenes,    coagulase negative staphylococci, S. aureus,    methicillin resistant S. aureus, Streptococcus agalactiae    (Group B), S. pneumoniae, S. pyogenes (Group A),    Acinetobacter baumannii, Enterobacter cloacae, E. coli,    Klebsiella oxytoca, K. pneumoniae, Proteus sp.,    Serratia marcescens, Haemophilus influenzae,    Neisseria meningitidis, Pseudomonas aeruginosa, Candida    albicans, C. glabrata, C krusei, C parapsilosis,    C. tropicalis and the KPC resistance gene.  Organism: Blood Culture PCR  Enterococcus faecalis (10-27-20 @ 14:11)  Organism: Enterococcus faecalis (10-27-20 @ 14:11)      -  Ampicillin: S <=2 Predicts results to ampicillin/sulbactam, amoxacillin-clavulanate and  piperacillin-tazobactam.      -  Gentamicin synergy: R      -  Vancomycin: S 2      Method Type: COLTEN  Organism: Blood Culture PCR (10-27-20 @ 14:11)      -  Enterococcus species: Detec      Method Type: PCR        VITALS:  T(C): 36.6 (11-01-20 @ 07:59), Max: 36.8 (11-01-20 @ 00:00)  T(F): 97.9 (11-01-20 @ 07:59), Max: 98.3 (11-01-20 @ 00:00)  HR: 74 (11-01-20 @ 07:59) (70 - 81)  BP: 120/70 (11-01-20 @ 07:59) (117/65 - 127/67)  BP(mean): --  ABP: --  ABP(mean): --  RR: 13 (11-01-20 @ 07:59) (13 - 17)  SpO2: 99% (11-01-20 @ 07:59) (97% - 99%)  CVP(mm Hg): --  CVP(cm H2O): --    Ins and Outs     10-31-20 @ 08:01  -  11-01-20 @ 07:00  --------------------------------------------------------  IN: 360 mL / OUT: 1300 mL / NET: -940 mL                I&O's Detail    31 Oct 2020 08:01  -  01 Nov 2020 07:00  --------------------------------------------------------  IN:    Oral Fluid: 360 mL  Total IN: 360 mL    OUT:    Indwelling Catheter - Urethral (mL): 1300 mL  Total OUT: 1300 mL    Total NET: -940 mL

## 2020-11-02 NOTE — CONSULT NOTE ADULT - SUBJECTIVE AND OBJECTIVE BOX
HPI:  Mr. 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 patient was on 100%NRB w/o2 saturation at 95%, in ED Patient stated that he felt much better since arriving and being placed on o2 therapy. Patient denied any n/v/d, chest pain, fatigue, or fevers.     ED ABG prior to O2 therapy: pH 7.41, pCO2 39, pO2 56, HCO3 26, FiO2 100%, O2 saturation 85%    CT scan was ordered which showed significant mucous plugging of right mainstem bronchus, leading to near complete collapse/atelectasis of right lung (24 Oct 2020 23:55)      PAST MEDICAL & SURGICAL HISTORY:  Anemia    Cardiac arrhythmia    Anxiety    COPD (chronic obstructive pulmonary disease)    Aortic aneurysm    Heart disease  sp cardiac cath with stent placement    BPH (benign prostatic hyperplasia)    Hypertension    S/P coronary artery stent placement  1 stent- blocked artery        SOCIAL HISTORY:    Admitted from:   [facility]   Substance abuse history:              Tobacco hx:                  Alcohol hx:              Home Opioid hx:  Cheondoism:                                    Preferred Language:    Surrogate/HCP/Guardian:     syeda Dietrich        Phone#:  [phone]    FAMILY HISTORY:  No significant family history      Baseline ADLs (prior to admission):    Allergies    No Known Allergies    Intolerances      Present Symptoms:   Dyspnea: mild  Nausea/Vomiting:   Anxiety:  Depressed   Fatigue:  Loss of appetite:   Pain:                 no               location:          Review of Systems:  Unable to obtain due to poor mentation/sob    MEDICATIONS  (STANDING):  aMIOdarone    Tablet 200 milliGRAM(s) Oral daily  amLODIPine   Tablet 10 milliGRAM(s) Oral daily  ampicillin  IVPB 2 Gram(s) IV Intermittent every 6 hours  bisacodyl Suppository 10 milliGRAM(s) Rectal daily  budesonide 160 MICROgram(s)/formoterol 4.5 MICROgram(s) Inhaler 2 Puff(s) Inhalation two times a day  cefTRIAXone   IVPB 2000 milliGRAM(s) IV Intermittent every 12 hours  enoxaparin Injectable 40 milliGRAM(s) SubCutaneous daily  furosemide   Injectable 40 milliGRAM(s) IV Push every 12 hours  influenza   Vaccine 0.5 milliLiter(s) IntraMuscular once  levothyroxine 100 MICROGram(s) Oral daily  lidocaine   Patch 1 Patch Transdermal every 24 hours  pantoprazole    Tablet 40 milliGRAM(s) Oral before breakfast  senna 2 Tablet(s) Oral at bedtime  tamsulosin 0.4 milliGRAM(s) Oral two times a day    MEDICATIONS  (PRN):  acetaminophen   Tablet .. 650 milliGRAM(s) Oral every 6 hours PRN Temp greater or equal to 38C (100.4F), Mild Pain (1 - 3)  albuterol/ipratropium for Nebulization 3 milliLiter(s) Nebulizer every 6 hours PRN Shortness of Breath and/or Wheezing  ALPRAZolam 0.25 milliGRAM(s) Oral at bedtime PRN insomnia      PHYSICAL EXAM:    Vital Signs Last 24 Hrs  T(C): 36.6 (02 Nov 2020 08:41), Max: 37 (01 Nov 2020 16:05)  T(F): 97.9 (02 Nov 2020 08:41), Max: 98.6 (01 Nov 2020 16:05)  HR: 72 (02 Nov 2020 12:13) (68 - 78)  BP: 111/79 (02 Nov 2020 11:06) (105/66 - 123/67)  BP(mean): 85 (02 Nov 2020 11:06) (79 - 85)  RR: 14 (02 Nov 2020 12:13) (14 - 21)  SpO2: 100% (02 Nov 2020 12:13) (96% - 100%)    General: alert  oriented x _2-3 int. lethargic  but  verbal   Karnofsky Performance Score/Palliative Performance Status Version2:   40  %  PPSV:40  HEENT: normal  dry mouth    Lungs: coarse w/labored breathing    CV: normal rate   GI: normal  non-distended, soft    : normal  incontinent   Musculoskeletal: normal  w/weakness  non-  ambulatory/ bedbound  Skin: normal, w/d , fragile   Neuro: alert, awake, can converse, periods confusion   Oral intake ability: minimal/ moderate  capability needs assist    Diet: soft, as param     LABS:                        8.0    8.11  )-----------( 219      ( 01 Nov 2020 05:50 )             27.0     11-01    142  |  105  |  18  ----------------------------<  97  3.4<L>   |  30  |  1.26    Ca    9.0      01 Nov 2020 05:50          RADIOLOGY & ADDITIONAL STUDIES:< from: Xray Chest 1 View-PORTABLE IMMEDIATE (Xray Chest 1 View-PORTABLE IMMEDIATE .) (11.02.20 @ 08:54) >    EXAM:  XR CHEST PORTABLE IMMED 1V      PROCEDURE DATE:  11/02/2020        INTERPRETATION:  DATE OF STUDY: 11/2/2020    PRIOR:10/31/20 plain chest; had CT angio of chest done 10/28/20    CLINICAL INDICATION: Hypoxic. Assess pleural effusion.    TECHNIQUE: portable chest.    FINDINGS:  The study limited by patient positioning.  Thoracic aortic atheromatous changes and ectasia are stable.  S/P TEVAR procedure.  Stable cardiomegaly.  Mediastinum is within normal limits.  Persistent mild vascular congestive changes.  Persistent small bilateral pleural effusions, right more than left - with associated bibasilar atelectases.  No pneumothorax.  No acute bony finding.    IMPRESSION:  1. Stable mild pulmonary vascular congestive changes.  2. Stable mild bilateral pleural effusions.                    ADVANCE DIRECTIVES: molst dnr/  dni but Bi-pap is OK   Advanced Care Planning discussion total time spent:

## 2020-11-02 NOTE — PROGRESS NOTE ADULT - SUBJECTIVE AND OBJECTIVE BOX
Follow-up Critical Care Progress Note  Chief Complaint : Hypoxemia          No new events overnight.  Denies SOB/CP.       Allergies :No Known Allergies      PAST MEDICAL & SURGICAL HISTORY:  Anemia    Cardiac arrhythmia    Anxiety    COPD (chronic obstructive pulmonary disease)    Aortic aneurysm    Heart disease  sp cardiac cath with stent placement    BPH (benign prostatic hyperplasia)    Hypertension    S/P coronary artery stent placement  1 stent- blocked artery        Medications:  MEDICATIONS  (STANDING):  aMIOdarone    Tablet 200 milliGRAM(s) Oral daily  amLODIPine   Tablet 10 milliGRAM(s) Oral daily  ampicillin  IVPB 2 Gram(s) IV Intermittent every 6 hours  bisacodyl Suppository 10 milliGRAM(s) Rectal daily  budesonide 160 MICROgram(s)/formoterol 4.5 MICROgram(s) Inhaler 2 Puff(s) Inhalation two times a day  cefTRIAXone   IVPB 2000 milliGRAM(s) IV Intermittent every 12 hours  enoxaparin Injectable 40 milliGRAM(s) SubCutaneous daily  furosemide   Injectable 40 milliGRAM(s) IV Push every 12 hours  influenza   Vaccine 0.5 milliLiter(s) IntraMuscular once  levothyroxine 100 MICROGram(s) Oral daily  lidocaine   Patch 1 Patch Transdermal every 24 hours  pantoprazole    Tablet 40 milliGRAM(s) Oral before breakfast  senna 2 Tablet(s) Oral at bedtime  tamsulosin 0.4 milliGRAM(s) Oral two times a day    MEDICATIONS  (PRN):  acetaminophen   Tablet .. 650 milliGRAM(s) Oral every 6 hours PRN Temp greater or equal to 38C (100.4F), Mild Pain (1 - 3)  albuterol/ipratropium for Nebulization 3 milliLiter(s) Nebulizer every 6 hours PRN Shortness of Breath and/or Wheezing  ALPRAZolam 0.25 milliGRAM(s) Oral at bedtime PRN insomnia    COVID  10-24-20 @ 21:00  COVID -   NotDetec  09-18-20 @ 16:30  COVID -   NotDetec      COVID Biomarkers    10-28-20 @ 06:00 ESR --  ---  CRP --  ---  DDimer  --   ---      ---   Ferritin --    09-22-20 @ 06:08 ESR --  ---  CRP --  ---  DDimer  --   ---      ---   Ferritin --          Covid 19 IgG ab Index/Interpretation 10-25-20 @ 05:00 0.08 / Negative  Covid 19 IgG ab Index/Interpretation 09-18-20 @ 20:45 <0.10 / Negative    Trend Cardiac Enzymes    Trend BNP  10-24-20 @ 20:28   -  3345<H>    Procalcitonin Trend    WBC Trend  11-01-20 @ 05:50   -  8.11  10-31-20 @ 05:30   -  9.33    H/H Trend  11-01-20 @ 05:50   -  8.0<L> / 27.0<L>  10-31-20 @ 05:30   -  8.0<L> / 26.2<L>    Platelet Trend  11-01-20 @ 05:50   -  219  10-31-20 @ 05:30   -  243    Trend Sodium  11-01-20 @ 05:50   -  142  10-31-20 @ 05:30   -  145    Trend Potassium  11-01-20 @ 05:50   -  3.4<L>  10-31-20 @ 05:30   -  3.0<L>    Trend Bun/Cr  11-01-20 @ 05:50  BUN/CR -  18 / 1.26  10-31-20 @ 05:30  BUN/CR -  18 / 1.24    Lactic Acid Trend  10-24-20 @ 20:28   -   0.8    ABG Trend  11-02-20 @ 08:00   - 7.42/47<H>/62<L>/90<L>  10-25-20 @ 05:01   - 7.41/37/79/94  10-24-20 @ 20:30   - 7.41/39/56<L>/85<L>    Trend AST/ALT/ALK Phos/Bili  10-29-20 @ 06:15   11/18/94/0.4  10-27-20 @ 05:20   11/15/103/0.3  10-24-20 @ 20:28   28/20/140<H>/0.4  09-18-20 @ 11:34   24/27/101/0.4      Ammonia Trend      Amylase / Lipase Trend      Albumin Trend  10-29-20 @ 06:15   -   2.9<L>  10-27-20 @ 05:20   -   2.9<L>  10-24-20 @ 20:28   -   1.4<L>  09-18-20 @ 11:34   -   2.0<L>      PTT - PT - INR Trend  10-24-20 @ 20:28   -   39.9<H> - 14.0<H> - 1.17<H>  09-18-20 @ 11:34   -   -- - 13.2 - 1.10    Glucose Trend  11-01-20 @ 05:50   -  -- 97 --  10-31-20 @ 05:30   -  -- 92 --        LABS:                        8.0    8.11  )-----------( 219      ( 01 Nov 2020 05:50 )             27.0     11-01    142  |  105  |  18  ----------------------------<  97  3.4<L>   |  30  |  1.26    Ca    9.0      01 Nov 2020 05:50      Fluid characteristics  -- 10-27 @ 12:15  pH 7.8  LDH 75  tprot 2.6    Cell count  Appearance Slightly Cloudy  Fluid type --  BF lymph 15  color Yellow  eosinophil --  PMN 20  Mesothelial --  Monocyte 65  Other body cells --                      CULTURES: (if applicable)    Culture - Blood (collected 10-31-20 @ 16:30)  Source: .Blood Blood-Venous  Preliminary Report (11-01-20 @ 21:02):    No growth to date.    Culture - Blood (collected 10-31-20 @ 16:30)  Source: .Blood Blood-Peripheral  Preliminary Report (11-01-20 @ 21:02):    No growth to date.    Culture - Blood (collected 10-29-20 @ 06:20)  Source: .Blood Blood-Venous  Gram Stain (10-31-20 @ 12:47):    Growth in aerobic bottle: Gram positive cocci in pairs    Growth in anaerobic bottle: Gram positive cocci in pairs  Preliminary Report (11-01-20 @ 11:28):    Growth in aerobic and anaerobic bottles: Enterococcus faecalis    Susceptibility to follow.    "Due to technical problems, Proteus sp. will Not be reported as part of    the BCID panel until further notice"    ***Blood Panel PCR results on this specimen are available    approximately 3 hours after the Gram stain result.***    Gram stain, PCR, and/or culture results may not always    correspond due to difference in methodologies.    ************************************************************    This PCR assay was performed using [employer].    The following targets are tested for: Enterococcus,    vancomycin resistant enterococci, Listeria monocytogenes,    coagulase negative staphylococci, S. aureus,    methicillin resistant S. aureus, Streptococcus agalactiae    (Group B), S. pneumoniae, S. pyogenes (Group A),    Acinetobacter baumannii, Enterobacter cloacae, E. coli,    Klebsiella oxytoca, K. pneumoniae, Proteus sp.,    Serratia marcescens, Haemophilus influenzae,    Neisseria meningitidis, Pseudomonas aeruginosa, Candida    albicans, C. glabrata, C krusei, C parapsilosis,    C. tropicalis and the KPC resistance gene.  Organism: Blood Culture PCR (10-31-20 @ 06:58)  Organism: Blood Culture PCR (10-31-20 @ 06:58)      -  Enterococcus species: Detec      Method Type: PCR    Culture - Blood (collected 10-29-20 @ 06:20)  Source: .Blood Blood-Peripheral  Gram Stain (10-31-20 @ 04:51):    Growth in anaerobic bottle: Gram Positive Cocci in Pairs and Chains    Growth in aerobic bottle: Gram Positive Cocci in Pairs and Chains  Preliminary Report (11-01-20 @ 19:20):    Growth in aerobic and anaerobic bottles: Enterococcus faecalis  Organism: Enterococcus faecalis (11-01-20 @ 13:38)  Organism: Enterococcus faecalis (11-01-20 @ 13:38)      -  Ampicillin: S <=2 Predicts results to ampicillin/sulbactam, amoxacillin-clavulanate and  piperacillin-tazobactam.      -  Gentamicin synergy: R      -  Vancomycin: S 2      Method Type: COLTEN    Culture - Fungal, Body Fluid (collected 10-27-20 @ 12:15)  Source: Pleural Fl Pleural Fluid  Preliminary Report (10-28-20 @ 08:13):    Testing in progress    Culture - Body Fluid with Gram Stain (collected 10-27-20 @ 12:15)  Source: Pleural Fl Pleural Fluid  Gram Stain (10-27-20 @ 23:20):    polymorphonuclear leukocytes seen    No organisms seen    by cytocentrifuge  Final Report (11-01-20 @ 15:04):    No growth    Culture - Blood (collected 10-27-20 @ 05:20)  Source: .Blood Blood-Venous  Gram Stain (10-28-20 @ 02:51):    Growth in aerobic bottle: Gram Positive Cocci in Pairs and Chains    Growth in anaerobic bottle: Gram Positive Cocci in Pairs and Chains  Final Report (10-28-20 @ 21:15):    Growth in aerobic and anaerobic bottles: Enterococcus faecalis    See previous culture [id number]    Culture - Blood (collected 10-27-20 @ 05:20)  Source: .Blood Blood-Peripheral  Gram Stain (10-28-20 @ 03:37):    Growth in anaerobic bottle: Gram Positive Cocci in Pairs and Chains    Growth in aerobic bottle: Gram Positive Cocci in Pairs and Chains  Final Report (10-28-20 @ 22:37):    Growth in aerobic and anaerobic bottles: Enterococcus faecalis    See previous culture [id number]    Culture - Urine (collected 10-24-20 @ 22:15)  Source: .Urine Clean Catch (Midstream)  Final Report (10-26-20 @ 07:55):    <10,000 CFU/mL Normal Urogenital Tari    Culture - Blood (collected 10-24-20 @ 20:28)  Source: .Blood Blood-Peripheral  Gram Stain (10-25-20 @ 16:04):    Growth in aerobic and anaerobic bottles: Gram Positive Cocci in Pairs and    Chains  Final Report (10-27-20 @ 14:11):    Growth in aerobic and anaerobic bottles: Enterococcus faecalis    See previous culture [id number]    Culture - Blood (collected 10-24-20 @ 20:28)  Source: .Blood Blood-Peripheral  Gram Stain (10-25-20 @ 16:01):    Growth in anaerobic bottle: Gram Positive Cocci in Pairs and Chains    Growth in aerobic bottle: Gram Positive Cocci in Pairs and Chains  Final Report (10-27-20 @ 14:11):    Growth in aerobic and anaerobic bottles: Enterococcus faecalis    "Due to technical problems, Proteus sp. will Not be reported as part of    the BCID panel until further notice" ***Blood Panel PCR results on this    specimen are available    approximately 3 hours after the Gram stain result.***    Gram stain, PCR, and/or culture results may not always    correspond due to difference in methodologies.    ************************************************************    This PCR assay was performed using [employer].    The following targets are tested for: Enterococcus,    vancomycin resistant enterococci, Listeria monocytogenes,    coagulase negative staphylococci, S. aureus,    methicillin resistant S. aureus, Streptococcus agalactiae    (Group B), S. pneumoniae, S. pyogenes (Group A),    Acinetobacter baumannii, Enterobacter cloacae, E. coli,    Klebsiella oxytoca, K. pneumoniae, Proteus sp.,    Serratia marcescens, Haemophilus influenzae,    Neisseria meningitidis, Pseudomonas aeruginosa, Candida    albicans, C. glabrata, C krusei, C parapsilosis,    C. tropicalis and the KPC resistance gene.  Organism: Blood Culture PCR  Enterococcus faecalis (10-27-20 @ 14:11)  Organism: Enterococcus faecalis (10-27-20 @ 14:11)      -  Ampicillin: S <=2 Predicts results to ampicillin/sulbactam, amoxacillin-clavulanate and  piperacillin-tazobactam.      -  Gentamicin synergy: R      -  Vancomycin: S 2      Method Type: COLTEN  Organism: Blood Culture PCR (10-27-20 @ 14:11)      -  Enterococcus species: Detec      Method Type: PCR          ABG - ( 02 Nov 2020 08:00 )  pH, Arterial: 7.42  pH, Blood: x     /  pCO2: 47    /  pO2: 62    / HCO3: x     / Base Excess: x     /  SaO2: 90                CAPILLARY BLOOD GLUCOSE          RADIOLOGY  CXR:      CT:    ECHO:      VITALS:  T(C): 36.3 (11-02-20 @ 05:21), Max: 37 (11-01-20 @ 16:05)  T(F): 97.4 (11-02-20 @ 05:21), Max: 98.6 (11-01-20 @ 16:05)  HR: 69 (11-02-20 @ 05:21) (69 - 78)  BP: 122/63 (11-02-20 @ 05:21) (115/55 - 123/67)  BP(mean): --  ABP: --  ABP(mean): --  RR: 17 (11-02-20 @ 00:59) (16 - 17)  SpO2: 97% (11-02-20 @ 00:59) (96% - 99%)  CVP(mm Hg): --  CVP(cm H2O): --    Ins and Outs     11-01-20 @ 07:01  -  11-02-20 @ 07:00  --------------------------------------------------------  IN: 250 mL / OUT: 1200 mL / NET: -950 mL                I&O's Detail    01 Nov 2020 07:01  -  02 Nov 2020 07:00  --------------------------------------------------------  IN:    IV PiggyBack: 200 mL    IV PiggyBack: 50 mL  Total IN: 250 mL    OUT:    Indwelling Catheter - Urethral (mL): 1200 mL  Total OUT: 1200 mL    Total NET: -950 mL         Follow-up Critical Care Progress Note  Chief Complaint : Hypoxemia    was called bedside as this am Noted hypoxia and now placed on NRB  patient complaining of SOB, with out having resp distress  no cp, palp, n/v  ABG obtained on NRB shows patient with Pao2 60 with sat 90  bedside monitor sat now 98%  bedside US done showed mild B lines + effusion Left>Right  bedside CXR was rotated,  ? trachea deviation to right        Allergies :No Known Allergies      PAST MEDICAL & SURGICAL HISTORY:  Anemia    Cardiac arrhythmia    Anxiety    COPD (chronic obstructive pulmonary disease)    Aortic aneurysm    Heart disease  sp cardiac cath with stent placement    BPH (benign prostatic hyperplasia)    Hypertension    S/P coronary artery stent placement  1 stent- blocked artery        Medications:  MEDICATIONS  (STANDING):  aMIOdarone    Tablet 200 milliGRAM(s) Oral daily  amLODIPine   Tablet 10 milliGRAM(s) Oral daily  ampicillin  IVPB 2 Gram(s) IV Intermittent every 6 hours  bisacodyl Suppository 10 milliGRAM(s) Rectal daily  budesonide 160 MICROgram(s)/formoterol 4.5 MICROgram(s) Inhaler 2 Puff(s) Inhalation two times a day  cefTRIAXone   IVPB 2000 milliGRAM(s) IV Intermittent every 12 hours  enoxaparin Injectable 40 milliGRAM(s) SubCutaneous daily  furosemide   Injectable 40 milliGRAM(s) IV Push every 12 hours  influenza   Vaccine 0.5 milliLiter(s) IntraMuscular once  levothyroxine 100 MICROGram(s) Oral daily  lidocaine   Patch 1 Patch Transdermal every 24 hours  pantoprazole    Tablet 40 milliGRAM(s) Oral before breakfast  senna 2 Tablet(s) Oral at bedtime  tamsulosin 0.4 milliGRAM(s) Oral two times a day    MEDICATIONS  (PRN):  acetaminophen   Tablet .. 650 milliGRAM(s) Oral every 6 hours PRN Temp greater or equal to 38C (100.4F), Mild Pain (1 - 3)  albuterol/ipratropium for Nebulization 3 milliLiter(s) Nebulizer every 6 hours PRN Shortness of Breath and/or Wheezing  ALPRAZolam 0.25 milliGRAM(s) Oral at bedtime PRN insomnia    COVID  10-24-20 @ 21:00  COVID -   NotDetec  09-18-20 @ 16:30  COVID -   NotDetec      COVID Biomarkers    10-28-20 @ 06:00 ESR --  ---  CRP --  ---  DDimer  --   ---      ---   Ferritin --    09-22-20 @ 06:08 ESR --  ---  CRP --  ---  DDimer  --   ---      ---   Ferritin --          Covid 19 IgG ab Index/Interpretation 10-25-20 @ 05:00 0.08 / Negative  Covid 19 IgG ab Index/Interpretation 09-18-20 @ 20:45 <0.10 / Negative    Trend Cardiac Enzymes    Trend BNP  10-24-20 @ 20:28   -  3345<H>    WBC Trend  11-01-20 @ 05:50   -  8.11  10-31-20 @ 05:30   -  9.33    H/H Trend  11-01-20 @ 05:50   -  8.0<L> / 27.0<L>  10-31-20 @ 05:30   -  8.0<L> / 26.2<L>    Platelet Trend  11-01-20 @ 05:50   -  219  10-31-20 @ 05:30   -  243    Trend Sodium  11-01-20 @ 05:50   -  142  10-31-20 @ 05:30   -  145    Trend Potassium  11-01-20 @ 05:50   -  3.4<L>  10-31-20 @ 05:30   -  3.0<L>    Trend Bun/Cr  11-01-20 @ 05:50  BUN/CR -  18 / 1.26  10-31-20 @ 05:30  BUN/CR -  18 / 1.24    Lactic Acid Trend  10-24-20 @ 20:28   -   0.8    ABG Trend  11-02-20 @ 08:00   - 7.42/47<H>/62<L>/90<L>  10-25-20 @ 05:01   - 7.41/37/79/94  10-24-20 @ 20:30   - 7.41/39/56<L>/85<L>    Trend AST/ALT/ALK Phos/Bili  10-29-20 @ 06:15   11/18/94/0.4  10-27-20 @ 05:20   11/15/103/0.3  10-24-20 @ 20:28   28/20/140<H>/0.4  09-18-20 @ 11:34   24/27/101/0.4    Albumin Trend  10-29-20 @ 06:15   -   2.9<L>  10-27-20 @ 05:20   -   2.9<L>  10-24-20 @ 20:28   -   1.4<L>  09-18-20 @ 11:34   -   2.0<L>      PTT - PT - INR Trend  10-24-20 @ 20:28   -   39.9<H> - 14.0<H> - 1.17<H>  09-18-20 @ 11:34   -   -- - 13.2 - 1.10    Glucose Trend  11-01-20 @ 05:50   -  -- 97 --  10-31-20 @ 05:30   -  -- 92 --        LABS:                        8.0    8.11  )-----------( 219      ( 01 Nov 2020 05:50 )             27.0     11-01    142  |  105  |  18  ----------------------------<  97  3.4<L>   |  30  |  1.26    Ca    9.0      01 Nov 2020 05:50      Fluid characteristics  -- 10-27 @ 12:15  pH 7.8  LDH 75  tprot 2.6    Cell count  Appearance Slightly Cloudy  Fluid type --  BF lymph 15  color Yellow  eosinophil --  PMN 20  Mesothelial --  Monocyte 65  Other body cells --      CULTURES: (if applicable)    Culture - Blood (collected 10-31-20 @ 16:30)  Source: .Blood Blood-Venous  Preliminary Report (11-01-20 @ 21:02):    No growth to date.    Culture - Blood (collected 10-31-20 @ 16:30)  Source: .Blood Blood-Peripheral  Preliminary Report (11-01-20 @ 21:02):    No growth to date.    Culture - Blood (collected 10-29-20 @ 06:20)  Source: .Blood Blood-Venous  Gram Stain (10-31-20 @ 12:47):    Growth in aerobic bottle: Gram positive cocci in pairs    Growth in anaerobic bottle: Gram positive cocci in pairs  Preliminary Report (11-01-20 @ 11:28):    Growth in aerobic and anaerobic bottles: Enterococcus faecalis    Susceptibility to follow.    "Due to technical problems, Proteus sp. will Not be reported as part of    the BCID panel until further notice"    ***Blood Panel PCR results on this specimen are available    approximately 3 hours after the Gram stain result.***    Gram stain, PCR, and/or culture results may not always    correspond due to difference in methodologies.    ************************************************************    This PCR assay was performed using [employer].    The following targets are tested for: Enterococcus,    vancomycin resistant enterococci, Listeria monocytogenes,    coagulase negative staphylococci, S. aureus,    methicillin resistant S. aureus, Streptococcus agalactiae    (Group B), S. pneumoniae, S. pyogenes (Group A),    Acinetobacter baumannii, Enterobacter cloacae, E. coli,    Klebsiella oxytoca, K. pneumoniae, Proteus sp.,    Serratia marcescens, Haemophilus influenzae,    Neisseria meningitidis, Pseudomonas aeruginosa, Candida    albicans, C. glabrata, C krusei, C parapsilosis,    C. tropicalis and the KPC resistance gene.  Organism: Blood Culture PCR (10-31-20 @ 06:58)  Organism: Blood Culture PCR (10-31-20 @ 06:58)      -  Enterococcus species: Detec      Method Type: PCR    Culture - Blood (collected 10-29-20 @ 06:20)  Source: .Blood Blood-Peripheral  Gram Stain (10-31-20 @ 04:51):    Growth in anaerobic bottle: Gram Positive Cocci in Pairs and Chains    Growth in aerobic bottle: Gram Positive Cocci in Pairs and Chains  Preliminary Report (11-01-20 @ 19:20):    Growth in aerobic and anaerobic bottles: Enterococcus faecalis  Organism: Enterococcus faecalis (11-01-20 @ 13:38)  Organism: Enterococcus faecalis (11-01-20 @ 13:38)      -  Ampicillin: S <=2 Predicts results to ampicillin/sulbactam, amoxacillin-clavulanate and  piperacillin-tazobactam.      -  Gentamicin synergy: R      -  Vancomycin: S 2      Method Type: COLTEN    Culture - Fungal, Body Fluid (collected 10-27-20 @ 12:15)  Source: Pleural Fl Pleural Fluid  Preliminary Report (10-28-20 @ 08:13):    Testing in progress    Culture - Body Fluid with Gram Stain (collected 10-27-20 @ 12:15)  Source: Pleural Fl Pleural Fluid  Gram Stain (10-27-20 @ 23:20):    polymorphonuclear leukocytes seen    No organisms seen    by cytocentrifuge  Final Report (11-01-20 @ 15:04):    No growth    Culture - Blood (collected 10-27-20 @ 05:20)  Source: .Blood Blood-Venous  Gram Stain (10-28-20 @ 02:51):    Growth in aerobic bottle: Gram Positive Cocci in Pairs and Chains    Growth in anaerobic bottle: Gram Positive Cocci in Pairs and Chains  Final Report (10-28-20 @ 21:15):    Growth in aerobic and anaerobic bottles: Enterococcus faecalis    See previous culture [id number]    Culture - Blood (collected 10-27-20 @ 05:20)  Source: .Blood Blood-Peripheral  Gram Stain (10-28-20 @ 03:37):    Growth in anaerobic bottle: Gram Positive Cocci in Pairs and Chains    Growth in aerobic bottle: Gram Positive Cocci in Pairs and Chains  Final Report (10-28-20 @ 22:37):    Growth in aerobic and anaerobic bottles: Enterococcus faecalis    See previous culture [id number]    Culture - Urine (collected 10-24-20 @ 22:15)  Source: .Urine Clean Catch (Midstream)  Final Report (10-26-20 @ 07:55):    <10,000 CFU/mL Normal Urogenital Tari    Culture - Blood (collected 10-24-20 @ 20:28)  Source: .Blood Blood-Peripheral  Gram Stain (10-25-20 @ 16:04):    Growth in aerobic and anaerobic bottles: Gram Positive Cocci in Pairs and    Chains  Final Report (10-27-20 @ 14:11):    Growth in aerobic and anaerobic bottles: Enterococcus faecalis    See previous culture [id number]    Culture - Blood (collected 10-24-20 @ 20:28)  Source: .Blood Blood-Peripheral  Gram Stain (10-25-20 @ 16:01):    Growth in anaerobic bottle: Gram Positive Cocci in Pairs and Chains    Growth in aerobic bottle: Gram Positive Cocci in Pairs and Chains  Final Report (10-27-20 @ 14:11):    Growth in aerobic and anaerobic bottles: Enterococcus faecalis    "Due to technical problems, Proteus sp. will Not be reported as part of    the BCID panel until further notice" ***Blood Panel PCR results on this    specimen are available    approximately 3 hours after the Gram stain result.***    Gram stain, PCR, and/or culture results may not always    correspond due to difference in methodologies.    ************************************************************    This PCR assay was performed using [employer].    The following targets are tested for: Enterococcus,    vancomycin resistant enterococci, Listeria monocytogenes,    coagulase negative staphylococci, S. aureus,    methicillin resistant S. aureus, Streptococcus agalactiae    (Group B), S. pneumoniae, S. pyogenes (Group A),    Acinetobacter baumannii, Enterobacter cloacae, E. coli,    Klebsiella oxytoca, K. pneumoniae, Proteus sp.,    Serratia marcescens, Haemophilus influenzae,    Neisseria meningitidis, Pseudomonas aeruginosa, Candida    albicans, C. glabrata, C krusei, C parapsilosis,    C. tropicalis and the KPC resistance gene.  Organism: Blood Culture PCR  Enterococcus faecalis (10-27-20 @ 14:11)  Organism: Enterococcus faecalis (10-27-20 @ 14:11)      -  Ampicillin: S <=2 Predicts results to ampicillin/sulbactam, amoxacillin-clavulanate and  piperacillin-tazobactam.      -  Gentamicin synergy: R      -  Vancomycin: S 2      Method Type: COLTEN  Organism: Blood Culture PCR (10-27-20 @ 14:11)      -  Enterococcus species: Detec      Method Type: PCR          ABG - ( 02 Nov 2020 08:00 )  pH, Arterial: 7.42  pH, Blood: x     /  pCO2: 47    /  pO2: 62    / HCO3: x     / Base Excess: x     /  SaO2: 90                CAPILLARY BLOOD GLUCOSE          RADIOLOGY  CXR:  awaiting official read     VITALS:  T(C): 36.3 (11-02-20 @ 05:21), Max: 37 (11-01-20 @ 16:05)  T(F): 97.4 (11-02-20 @ 05:21), Max: 98.6 (11-01-20 @ 16:05)  HR: 69 (11-02-20 @ 05:21) (69 - 78)  BP: 122/63 (11-02-20 @ 05:21) (115/55 - 123/67)  BP(mean): --  ABP: --  ABP(mean): --  RR: 17 (11-02-20 @ 00:59) (16 - 17)  SpO2: 97% (11-02-20 @ 00:59) (96% - 99%)  CVP(mm Hg): --  CVP(cm H2O): --    Ins and Outs     11-01-20 @ 07:01  -  11-02-20 @ 07:00  --------------------------------------------------------  IN: 250 mL / OUT: 1200 mL / NET: -950 mL                I&O's Detail    01 Nov 2020 07:01  -  02 Nov 2020 07:00  --------------------------------------------------------  IN:    IV PiggyBack: 200 mL    IV PiggyBack: 50 mL  Total IN: 250 mL    OUT:    Indwelling Catheter - Urethral (mL): 1200 mL  Total OUT: 1200 mL    Total NET: -950 mL

## 2020-11-02 NOTE — PROGRESS NOTE ADULT - ASSESSMENT
Physical Examination:  GENERAL:               Alert, Oriented, No acute distress.  frail male  HEENT:                   Symmetric. No JVD, Moist MM  PULM:                     Bilateral air entry but diminished on Right, no wheezing, rales  CVS:                         S1, S2,  No Murmur  ABD:                        Soft, nondistended, nontender, normoactive bowel sounds,   EXT:                         No edema, nontender, No Cyanosis or Clubbing   Vascular:                Warm Extremities, Normal Capillary refill, Normal Distal Pulses  SKIN:                       Warm and well perfused, no rashes noted.   NEURO:                  Alert, oriented, interactive, follows commands  PSYC:                      Calm, + Insight.      Assessment:  1. Acute respiratory failure due to suspected volume overload state ; now with Recurrent hypoxic respiratory failure on 11/2/20  2. Bilateral pleural effusion s/p Right pigtail chest tube - transudate x 2 with negative cytology x 2   3. COPD  4. Right upper lobe collapse   5. Coronary artery disease   6. Hypertension   7. Hypothyroidism  8. Bacteremia - enterococcus faecalis with LA mobile mass at risk for endocarditis     Plan  - transfer back to ICU, start NIV as cxr no PNTX, ? attelectasis, taper fio2 monitor abg  - Repeat discussion with GOC/palliative care eval  - Will attempt to reach out to [facility] -  Benoit Carmona  - service paged  - Echo showing moderate LV dysfunction though no EF reported, also noted with left atrial mobile mass  - Abx as per ID, as cultures remain positive, repeat cultures today  - Cont. Diuresis with albumin for hypoalbuminemia  - cont. Symbicort and nebs  - Supplement electrolytes  - Oxygen support to maintain saturation > 90%  - Cont. thyroid medications and BPH medications  - Prognosis is poor, given recurrent hospitalizations, goals of care discussion  - Remains with poor oral intake  - DTYAZMIN Masseyie - [phone] - extensive discussion had, she does not want him transferred to [facility] at this time and her goal is to keep him comfortable, does not want major surgery. wants to discuss things with patients brother and get back to me regarding goals of care.  MOLST form discussed. and will make a decision today.  -Palliative care consult

## 2020-11-02 NOTE — PROGRESS NOTE ADULT - SUBJECTIVE AND OBJECTIVE BOX
Patient is a 80y old  Male who presents with a chief complaint of SOB/Hypoxia/Right lung white out/mucous plug (02 Nov 2020 15:27)      Patient seen and examined at bedside. Pt became acutely hypoxic this morning, found with a spO2 in the upper 70s while on 4L nasal cannula with + c/o of SOB.   ABG showed PaO2 62 and bedside US with B lines to the right lung.     ALLERGIES:  No Known Allergies    MEDICATIONS  (STANDING):  aMIOdarone    Tablet 200 milliGRAM(s) Oral daily  amLODIPine   Tablet 10 milliGRAM(s) Oral daily  ampicillin  IVPB 2 Gram(s) IV Intermittent every 6 hours  bisacodyl Suppository 10 milliGRAM(s) Rectal daily  budesonide 160 MICROgram(s)/formoterol 4.5 MICROgram(s) Inhaler 2 Puff(s) Inhalation two times a day  cefTRIAXone   IVPB 2000 milliGRAM(s) IV Intermittent every 12 hours  enoxaparin Injectable 60 milliGRAM(s) SubCutaneous every 12 hours  furosemide   Injectable 40 milliGRAM(s) IV Push every 12 hours  influenza   Vaccine 0.5 milliLiter(s) IntraMuscular once  levothyroxine 100 MICROGram(s) Oral daily  lidocaine   Patch 1 Patch Transdermal every 24 hours  pantoprazole    Tablet 40 milliGRAM(s) Oral before breakfast  senna 2 Tablet(s) Oral at bedtime  tamsulosin 0.4 milliGRAM(s) Oral two times a day    MEDICATIONS  (PRN):  acetaminophen   Tablet .. 650 milliGRAM(s) Oral every 6 hours PRN Temp greater or equal to 38C (100.4F), Mild Pain (1 - 3)  albuterol/ipratropium for Nebulization 3 milliLiter(s) Nebulizer every 6 hours PRN Shortness of Breath and/or Wheezing  ALPRAZolam 0.25 milliGRAM(s) Oral at bedtime PRN insomnia    Vital Signs Last 24 Hrs  T(F): 97.5 (02 Nov 2020 15:00), Max: 97.9 (02 Nov 2020 08:41)  HR: 67 (02 Nov 2020 18:00) (64 - 78)  BP: 113/68 (02 Nov 2020 18:00) (105/66 - 124/70)  RR: 15 (02 Nov 2020 18:00) (13 - 21)  SpO2: 100% (02 Nov 2020 18:00) (96% - 100%)  I&O's Summary    01 Nov 2020 07:01  -  02 Nov 2020 07:00  --------------------------------------------------------  IN: 250 mL / OUT: 1200 mL / NET: -950 mL    02 Nov 2020 07:01  -  02 Nov 2020 18:48  --------------------------------------------------------  IN: 800 mL / OUT: 900 mL / NET: -100 mL      PHYSICAL EXAM:  General: NAD, A/O x 3  ENT: dry MM, no oral thrush   Neck: Supple, No JVD  Lungs: decreased bilaterally worse on right side, respirations mildly labored   Cardio: RRR, S1/S2, No murmurs  Abdomen: Soft, Nontender, Nondistended; Bowel sounds present  Extremities: No calf tenderness, No pitting edema    LABS:                        8.0    8.11  )-----------( 219      ( 01 Nov 2020 05:50 )             27.0     11-01    142  |  105  |  18  ----------------------------<  97  3.4   |  30  |  1.26    Ca    9.0      01 Nov 2020 05:50  Phos  3.1     10-31  Mg     2.1     10-31      eGFR if Non African American: 54 mL/min/1.73M2 (11-01-20 @ 05:50)  eGFR if : 63 mL/min/1.73M2 (11-01-20 @ 05:50)                  ABG - ( 02 Nov 2020 11:55 )  pH, Arterial: 7.45  pH, Blood: x     /  pCO2: 42    /  pO2: 199   / HCO3: x     / Base Excess: x     /  SaO2: 97                              Culture - Blood (collected 31 Oct 2020 16:30)  Source: .Blood Blood-Venous  Preliminary Report (01 Nov 2020 21:02):    No growth to date.    Culture - Blood (collected 31 Oct 2020 16:30)  Source: .Blood Blood-Peripheral  Preliminary Report (01 Nov 2020 21:02):    No growth to date.    Culture - Blood (collected 29 Oct 2020 06:20)  Source: .Blood Blood-Venous  Gram Stain (31 Oct 2020 12:47):    Growth in aerobic bottle: Gram positive cocci in pairs    Growth in anaerobic bottle: Gram positive cocci in pairs  Final Report (02 Nov 2020 13:00):    Growth in aerobic and anaerobic bottles: Enterococcus faecalis    "Due to technical problems, Proteus sp. will Not be reported as part of    the BCID panel until further notice"    ***Blood Panel PCR results on this specimen are available    approximately 3 hours after the Gram stain result.***    Gram stain, PCR, and/or culture results may not always    correspond due to difference in methodologies.    ************************************************************    This PCR assay was performed using [employer]    The following targets are tested for: Enterococcus,    vancomycin resistant enterococci, Listeria monocytogenes,    coagulase negative staphylococci, S. aureus,    methicillin resistant S. aureus, Streptococcus agalactiae    (Group B), S. pneumoniae, S. pyogenes (Group A),    Acinetobacter baumannii, Enterobacter cloacae, E. coli,    Klebsiella oxytoca, K. pneumoniae, Proteus sp.,    Serratia marcescens, Haemophilus influenzae,    Neisseria meningitidis, Pseudomonas aeruginosa, Candida    albicans, C. glabrata, C krusei, C parapsilosis,    C. tropicalis and the KPC resistance gene.  Organism: Blood Culture PCR  Enterococcus faecalis (02 Nov 2020 13:00)  Organism: Enterococcus faecalis (02 Nov 2020 13:00)      -  Ampicillin: S <=2 Predicts results to ampicillin/sulbactam, amoxacillin-clavulanate and  piperacillin-tazobactam.      -  Gentamicin synergy: R      -  Vancomycin: S 2      Method Type: COLTEN  Organism: Blood Culture PCR (02 Nov 2020 13:00)      -  Enterococcus species: Detec      Method Type: PCR    Culture - Blood (collected 29 Oct 2020 06:20)  Source: .Blood Blood-Peripheral  Gram Stain (31 Oct 2020 04:51):    Growth in anaerobic bottle: Gram Positive Cocci in Pairs and Chains    Growth in aerobic bottle: Gram Positive Cocci in Pairs and Chains  Final Report (02 Nov 2020 08:57):    Growth in aerobic and anaerobic bottles: Enterococcus faecalis  Organism: Enterococcus faecalis (02 Nov 2020 08:57)  Organism: Enterococcus faecalis (02 Nov 2020 08:57)      -  Ampicillin: S <=2 Predicts results to ampicillin/sulbactam, amoxacillin-clavulanate and  piperacillin-tazobactam.      -  Gentamicin synergy: R      -  Vancomycin: S 2      Method Type: COLTEN    Culture - Fungal, Body Fluid (collected 27 Oct 2020 12:15)  Source: Pleural Fl Pleural Fluid  Preliminary Report (28 Oct 2020 08:13):    Testing in progress    Culture - Body Fluid with Gram Stain (collected 27 Oct 2020 12:15)  Source: Pleural Fl Pleural Fluid  Gram Stain (27 Oct 2020 23:20):    polymorphonuclear leukocytes seen    No organisms seen    by cytocentrifuge  Final Report (01 Nov 2020 15:04):    No growth    Culture - Blood (collected 27 Oct 2020 05:20)  Source: .Blood Blood-Venous  Gram Stain (28 Oct 2020 02:51):    Growth in aerobic bottle: Gram Positive Cocci in Pairs and Chains    Growth in anaerobic bottle: Gram Positive Cocci in Pairs and Chains  Final Report (28 Oct 2020 21:15):    Growth in aerobic and anaerobic bottles: Enterococcus faecalis    See previous culture [id number]    Culture - Blood (collected 27 Oct 2020 05:20)  Source: .Blood Blood-Peripheral  Gram Stain (28 Oct 2020 03:37):    Growth in anaerobic bottle: Gram Positive Cocci in Pairs and Chains    Growth in aerobic bottle: Gram Positive Cocci in Pairs and Chains  Final Report (28 Oct 2020 22:37):    Growth in aerobic and anaerobic bottles: Enterococcus faecalis    See previous culture [id number]        RADIOLOGY & ADDITIONAL TESTS:    < from: Xray Chest 1 View-PORTABLE IMMEDIATE (Xray Chest 1 View-PORTABLE IMMEDIATE .) (11.02.20 @ 08:54) >  INTERPRETATION:  DATE OF STUDY: 11/2/2020    PRIOR:10/31/20 plain chest; had CT angio of chest done 10/28/20    CLINICAL INDICATION: Hypoxic. Assess pleural effusion.    TECHNIQUE: portable chest.    FINDINGS:  The study limited by patient positioning.  Thoracic aortic atheromatous changes and ectasia are stable.  S/P TEVAR procedure.  Stable cardiomegaly.  Mediastinum is within normal limits.  Persistent mild vascular congestive changes.  Persistent small bilateral pleural effusions, right more than left - with associated bibasilar atelectases.  No pneumothorax.  No acute bony finding.    IMPRESSION:  1. Stable mild pulmonary vascular congestive changes.  2. Stable mild bilateral pleural effusions.              CORBY FUENTES MD; Attending Radiologist  This document has been electronically signed. Nov 2 2020 10:42AM    < end of copied text >    Care Discussed with Consultants/Other Providers:

## 2020-11-02 NOTE — PROGRESS NOTE ADULT - ASSESSMENT
80y male with a PMH of HTN, COPD, CAD, hypothyroidism, ascending aneurysm, BPH, s/p stent graft to repair AAA in late August /early September at [facility]   His Chest CT scan in September showed B/L effusions, RT>Lt, RUL atelectasis, endobronchial debris, and aortic graft stent with endoleak. S/p thoracentesis with fluid transudative. Was treated with Zosyn    Admitted on 10/24 with c/o SOB & started CTX and azithro at [facility]  He has multiple reasons for shortness of breath-effusions, atelectasis, and infiltrates.  Vancomycin added after blood cx recovered Enterococcus which is not a respiratory pathogen.  The organism is not growing in the urine, therefore raises concerns of endocarditis as well as graft infection. With his recent ? GI bleed, I am concerned about status of aneurysm and graft.  He has had a sustained bacteremia-2 sets of 10/24 blood cultures with enterococcus and 2 sets of blood cultures sent on 10/27 with enterococcus  Sustained bacteremia suggestive of endovascular focus  TTE without evidence of endocarditis.  CTA of abd & pelvis revealed stable appearance of descending thoracoabdominal aortic stent graft. No evidence of perigraft collection or gas to suggest graft infection. 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   pleural fluid is not infected, appears to be in heart failure  10/29 blood cultures reviewed and were positive 4 of 4 bottles with Enterococcus   micro results reviewed and repeat blood cultures done on 10/31 are no growth to date   WBC reviewed and is wnl, cr reviewed and stable     Suggest:   Continue Amp/CTX for extended enterococcus bacteremia   The patient is in ICU, reviewed notes in the chart the patient was seen by palliative care and his reviewing goals of care with the family   continue supportive care per pulmonary critical care, cardiology and hospitalist service   reviewed case with ICU nurse taking care of the patient

## 2020-11-02 NOTE — CONSULT NOTE ADULT - REASON FOR ADMISSION
SOB/Hypoxia/Right lung white out/mucous plug

## 2020-11-02 NOTE — CONSULT NOTE ADULT - ASSESSMENT
A/P  80 male with htn, copd, cad (s/p coronary stent placement), hypothyroid, ascending aortic aneurysm, anemia (treated with retacrit), unknown cardiac arrhythmia (on amiodarone), bph, s/p stent graft to repair AAA in late Aug/early Sept at [facility] presented to ED from [facility] with sob and hypoxia found with high grade enterococcus bacteremia.  PER CCU:   # Acute Hypoxic Respiratory Failure 2/2 right mainstem bronchus plugging  # Bilateral Pleural Effusions  pt admitted on 10/24 with sob and hypoxia  imaging revealed atelectasis of entire right lung 2/2 plugging of the right mainstem  also found with bilateral pleural effusion with partial right sided lung collapse  patient now s/p chest tube for pleural effusions, high flow oxygen therapy, aggressive pulmonary PT - now sent back to ccu again.   Assessment:  1. Acute respiratory failure due to suspected volume overload state ; now with Recurrent hypoxic respiratory failure on 11/2/20  2. Bilateral pleural effusion s/p Right pigtail chest tube - transudate x 2 with negative cytology x 2   3. COPD  4. Right upper lobe collapse   5. Coronary artery disease   6. Hypertension   7. Hypothyroidism  8. Bacteremia - enterococcus faecalis with LA mobile mass at risk for endocarditis     Plan  - transfer back to ICU, start NIV as cxr no PNTX, ? attelectasis, taper fio2 monitor abg  - Will attempt to reach out to [facility] -  Benoit Carmona  - service paged  - Echo showing moderate LV dysfunction though no EF reported, also noted with left atrial mobile mass  - Abx as per ID, as cultures remain positive, repeat cultures today  - Cont. Diuresis with albumin for hypoalbuminemia  - cont. Symbicort and nebs  - Supplement electrolytes  - Oxygen support to maintain saturation > 90%  - Cont. thyroid medications and BPH medications  - Prognosis is poor, given recurrent hospitalizations, goals of care discussion  - Remains with poor oral intake      Palliative : asked for on-going goc discussion , reviewed case w/ ccu team Dr Ni this AM, saw pt bedside this AM in tele. He was awake, oriented 2-3, w/ periods of confusion.  He was later  transferred  back to ccu for resp distress and placed in bi-pap. He has no s/s pain .Dr Ni called and spoke to pts daughter Kaur while I was present, he updated her medically and a molst form was fully reviewed and discussed , I then confirmed/witnessed  molst w/ daughter and which choices were made. Pt is to be a DNR, Dni but Bipap is OK. No feeding tube , pt for limited medical interventions. Some Goc established today .  Will cont to follow pt clinical course w/ ccu team.  Recs pending hospital course.

## 2020-11-02 NOTE — PROGRESS NOTE ADULT - ASSESSMENT
80 male with htn, copd, cad (s/p coronary stent placement), hypothyroid, ascending aortic aneurysm, anemia (treated with retacrit), unknown cardiac arrhythmia (on amiodarone), bph, s/p stent graft to repair AAA in late Aug/early Sept at [facility] presented to ED from [facility] for [facility] with sob and hypoxia found with high grade enterococcus bacteremia.    Pt found acutely hypoxic this am spO2 upper 70s on 4L NC, placed on NRB 15L,spO2 around 90%. Seen by Dr. Sarah and transferred to the ICU for BIPAP and a higher level of care.     # Acute Hypoxic Respiratory Failure   # Bilateral Pleural Effusions  -s/p pig tail  -patient acutely decompensating this am  -ABG PaO2 62  -chest x ray  -maintain spO2 >92%  -bedside ultrasound  -transfer to ICU for BIPAP  -Further care as per ICU management    # Sustained High Grade Enterococcus Bacteremia  ID following - patient with sustained bacteremia - 2 sets of 10/24 blood cultures with enterococcus and 2 sets of blood cultures on 10/27 then again on 10/29  sustained bacteremia suggestive of endovascular focus (pt is s/p stent graft to repair AAA)  CTA revealed stable appearance of aortic stent graft, type 2 endoleak inferior mesenteric artery  TTE no evidence of endocarditis  pleural fluid is transudative x2, cytology negative x 2  plan to continue ampicillin and Rocephin for extended enterococcal therapy day 7  no support for graft infection at this time, outpatient follow up  GOC discussion, as patient may not be ideal candidate for invasive measures. Prefers DNI and no DNR  palliative consult    # CAD  chronic condition - stable  pt was on asa and statin therapies at home  held during admission    # COPD  chronic condition  continue Symbicort and nebs  pt completed course of steroids and Zithromax as per ICU    # Chronic Combined CHF  TTE revealed mild to mod decreased systolic fxn, grade 1 DD, mod LVH, mild TR  continue furosemide 40 iv q 12  monitor fluid status    # HTN  chronic condition  continue norvasc 10 daily, holding lopressor  monitor BP    # Hypothyroid  chronic condition  continue levothyroxine 100 mcg daily    # BPH  chronic condition  continue flomax 0.4 mg daily    # Hypokalemia  replete as needed, follow up bmp      # Unknown cardiac arrhythmia  continue amiodarone    # Anemia  chronic condition  s/p prbc's during this admission on 10/27  no signs of bleeding  monitor    # Prophylactic Measure  enoxaparin 60 bid     # GOC  palliative consult

## 2020-11-03 NOTE — PROGRESS NOTE ADULT - SUBJECTIVE AND OBJECTIVE BOX
CC: f/u for  e.faecalis bacteremia  Patient reports he is feeling ok    REVIEW OF SYSTEMS:  All other review of systems negative (Comprehensive ROS)    Antimicrobials Day #  :7 synergistic tx  ampicillin  IVPB 2 Gram(s) IV Intermittent every 6 hours  cefTRIAXone   IVPB 2000 milliGRAM(s) IV Intermittent every 12 hours    Other Medications Reviewed    T(F): 97.8 (11-03-20 @ 07:00), Max: 97.8 (11-03-20 @ 07:00)  HR: 62 (11-03-20 @ 12:00)  BP: 108/71 (11-03-20 @ 12:00)  RR: 16 (11-03-20 @ 12:00)  SpO2: 100% (11-03-20 @ 12:00)  Wt(kg): --    PHYSICAL EXAM:  General: alert, no acute distress, very wasted  Eyes:  anicteric, no conjunctival injection, no discharge  Oropharynx: no lesions or injection 	  Neck: supple, without adenopathy  Lungs: course  to auscultation  Heart: regular rate and rhythm; no murmur, rubs or gallops  Abdomen: soft, nondistended, nontender, without mass or organomegaly  Skin: no lesions  Extremities: no clubbing, cyanosis, or edema  Neurologic: alert, oriented, moves all extremities    LAB RESULTS:                        7.8    7.67  )-----------( 176      ( 03 Nov 2020 06:00 )             25.2     11-03    141  |  102  |  18  ----------------------------<  114<H>  2.8<LL>   |  31  |  1.35<H>    Ca    8.7      03 Nov 2020 06:00  Phos  3.1     11-03  Mg     2.1     11-03    TPro  5.7<L>  /  Alb  2.0<L>  /  TBili  0.2  /  DBili  x   /  AST  13  /  ALT  9<L>  /  AlkPhos  95  11-03    LIVER FUNCTIONS - ( 03 Nov 2020 06:00 )  Alb: 2.0 g/dL / Pro: 5.7 g/dL / ALK PHOS: 95 U/L / ALT: 9 U/L / AST: 13 U/L / GGT: x             MICROBIOLOGY:  RECENT CULTURES:  10-31 @ 16:30 .Blood Blood-Peripheral     No growth to date.          RADIOLOGY REVIEWED:  r  < from: Xray Chest 1 View-PORTABLE IMMEDIATE (Xray Chest 1 View-PORTABLE IMMEDIATE .) (11.02.20 @ 08:54) >  EXAM:  XR CHEST PORTABLE IMMED 1V      PROCEDURE DATE:  11/02/2020        INTERPRETATION:  DATE OF STUDY: 11/2/2020    PRIOR:10/31/20 plain chest; had CT angio of chest done 10/28/20    CLINICAL INDICATION: Hypoxic. Assess pleural effusion.    TECHNIQUE: portable chest.    FINDINGS:  The study limited by patient positioning.  Thoracic aortic atheromatous changes and ectasia are stable.  S/P TEVAR procedure.  Stable cardiomegaly.  Mediastinum is within normal limits.  Persistent mild vascular congestive changes.  Persistent small bilateral pleural effusions, right more than left - with associated bibasilar atelectases.  No pneumothorax.  No acute bony finding.    IMPRESSION:  1. Stable mild pulmonary vascular congestive changes.  2. Stable mild bilateral pleural effusions.    < end of copied text >    < from: CT Angio Abdomen and Pelvis w/ IV Cont (10.28.20 @ 11:02) >  EXAM:  CT ANGIO ABD PELV (W)AW IC    EXAM:  CT ANGIO CHEST (W)AW IC      PROCEDURE DATE:  10/28/2020        INTERPRETATION:  CT ANGIO CHEST WITHOUT AND OR WITH IV CONTRAST, CT ANGIO ABDOMEN AND PELVIS WITHOUT AND OR WITH IV CONTRAST    CLINICAL INFORMATION:  eval for aortic graft infection    TECHNIQUE: CT angiogram of the thoracoabdominal aorta and pelvic arteries was performed prior to and following the rapid infusion of 90 cc Omnipaque 350 intravenous contrast material. This study was performed using automatic exposure control (radiation dose reduction software) to obtain a diagnostic image quality scan with patient dose as low as reasonably achievable.    Image post processing, including MIP images, was performed.    COMPARISON: Chest CT 10/24/2020, CTA chest/abdomen/pelvis 9/18/2020    FINDINGS:    THORACIC AORTA: Moderate atherosclerotic plaque without dissection. Stable appearance of descending thoracoabdominal aortic stent graft. Normal proximal graft attachment site. No evidence of perigraft collection or gas to suggest graft infection. Widely patent branch vessels of the arch. Aortic measurements as follows:      Aortic root: 4.9 cm, previously 4.8 cm    Sinotubular junction: 3.2 cm, previously 3.2 cm    Mid-ascending aorta: 4.1 cm, previously 4.2 cm    Transverse aortic arch: 3.6 cm, previously 3.5 cm    Mid-descending aorta: 3.7 cm, previously 3.6 cm    Level of diaphragm: 6.2 cm, previously 6.5 cm    ABDOMINAL AORTA: Stable type II endoleak arising from the inferiormesenteric artery. Normal distal graft attachment sites. Stable excluded aneurysm sac diameter measuring 5.1 cm.    Celiac: Patent stent  SMA: Patent stent  DOINTA: Patent  Renal Arteries: Patent left stent. Occluded right stent, unchanged.    ILIAC RUNOFF: Widely patent to the bilateral proximal femorals.    ADDITIONAL FINDINGS: The central airways are patent. Background emphysema. Improving right basilar consolidation with persistent additional patchy opacities throughout both lungs. Decreased size of moderate right hydropneumothorax with Pleurx catheter in place. Stable moderate layering left pleural effusion. Normal heart size. No pericardial effusion. Coronary atherosclerosis. Central pulmonary arteries are patent. No thoracic lymphadenopathy or hematoma.    The liver, spleen, pancreas, gallbladder, and biliary tree are normal. Atrophic right kidney. The remainder of the retroperitoneum is normal. Prostate radiation fiducial markers. Gil catheter in the bladder. Large fecal retention in the colon. Small ascites.    IMPRESSION:    Stable appearance of descending thoracoabdominal aortic stent graft. No evidence of perigraft collection or gas to suggest graft infection.    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 Stable excluded abdominal aortic aneurysm sac diameter measuring 5.1 cm.    Occluded right renal artery stent, unchanged.    Improving right basilar consolidation with persistent additional patchy opacities throughout both lungs. Findings are indicative of pneumonia.    Decreased size of moderate right hydropneumothorax with Pleurx catheter in place.        < end of copied text >          Assessment: Patient with recent AAA graft who has high grade sustained e. faecalis bacteremia, has a pleurex, originally had collapsed lung, back in icu for copd. and chf. He has a probable clot in the LA. He likely has an endovascular infection but is not a candidate for graft removal or heart surgery so will hold on felipa. So far latest cultures are negative     Plan:  continue ampicillin and ceftriaxone  await final cultures  supportive care

## 2020-11-03 NOTE — PHYSICAL THERAPY INITIAL EVALUATION ADULT - PERTINENT HX OF CURRENT PROBLEM, REHAB EVAL
Mr. 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 [facility]

## 2020-11-03 NOTE — PROGRESS NOTE ADULT - ASSESSMENT
A/P 80 male with htn, copd, cad (s/p coronary stent placement), hypothyroid, ascending aortic aneurysm, anemia (treated with retacrit), unknown cardiac arrhythmia (on amiodarone), bph, s/p stent graft to repair AAA in late Aug/early Sept at [facility] presented to ED from [facility] for [facility] with sob and hypoxia found with high grade enterococcus bacteremia. Awake, alert, interactive today .     # Acute Hypoxic Respiratory Failure 2/2 right mainstem bronchus plugging  # Bilateral Pleural Effusions  pt admitted on 10/24 with sob and hypoxia  imaging revealed atelectasis of entire right lung 2/2 plugging of the right mainstem  also found with bilateral pleural effusion with partial right sided lung collapse  patient now s/p chest tube for pleural effusions, high flow oxygen therapy, aggressive pulmonary PT - now sent back to ccu .  per CCU:  Assessment:  1. Acute respiratory failure due to suspected volume overload state ; now with Recurrent hypoxic respiratory failure on 11/2/20  2. Bilateral pleural effusion s/p Right pigtail chest tube - transudate x 2 with negative cytology x 2   3. COPD  4. Right upper lobe collapse   5. Coronary artery disease   6. Hypertension   7. Hypothyroidism  8. Bacteremia - enterococcus faecalis with LA mobile mass at risk for endocarditis     Plan per CCU:  - now off NIV, tolerating N/c with out any hypoxia and SOB.  - D/w Dr Benoit Carmona  - on 11/2 also believes in conservative management.     - On abx and a/c for  LA mobile mass   - monitor CXR   - Cont. Diuresis with albumin for hypoalbuminemia  - cont. Symbicort and nebs  - Supplement electrolytes  - Oxygen support to maintain saturation > 90%  - Cont. thyroid medications and BPH medications  - Prognosis is poor, given recurrent hospitalizations, made DNR/I on 11/2 MOLST filled out   - Remains with poor oral intake    Palliative : as f/u case reviewed w/ ccu team this AM , saw pt bedside, no c/o today. No c/o pain or sob, no cough.   [facility] established this hospitalization : dnr/ dni - bipap OK, no feeding tubes.   Will cont to follow pt hospital course, cont supportive care.

## 2020-11-03 NOTE — PHYSICAL THERAPY INITIAL EVALUATION ADULT - ADDITIONAL COMMENTS
pt is a poor historian, Info per previous eval 9/18/20: pt from [facility], states that he ambulates short distances w/rolling walker in therapy and is otherwise in w/c. pt requires assist w/most ADL's

## 2020-11-03 NOTE — PROGRESS NOTE ADULT - SUBJECTIVE AND OBJECTIVE BOX
Progress: awake, alert, no c/o , no issues overnight reported      Present Symptoms:   Dyspnea: mild  Nausea/Vomiting:   Anxiety:    Depressed Mood:   Fatigue: yes  Loss of appetite:   Pain:           no        location:   Review of Systems:   MEDICATIONS  (STANDING):  aMIOdarone    Tablet 200 milliGRAM(s) Oral daily  amLODIPine   Tablet 10 milliGRAM(s) Oral daily  ampicillin  IVPB 2 Gram(s) IV Intermittent every 6 hours  Biotene Dry Mouth Oral Rinse 5 milliLiter(s) Swish and Spit every 12 hours  bisacodyl Suppository 10 milliGRAM(s) Rectal daily  budesonide 160 MICROgram(s)/formoterol 4.5 MICROgram(s) Inhaler 2 Puff(s) Inhalation two times a day  cefTRIAXone   IVPB 2000 milliGRAM(s) IV Intermittent every 12 hours  enoxaparin Injectable 60 milliGRAM(s) SubCutaneous every 12 hours  furosemide   Injectable 40 milliGRAM(s) IV Push every 12 hours  influenza   Vaccine 0.5 milliLiter(s) IntraMuscular once  levothyroxine 100 MICROGram(s) Oral daily  lidocaine   Patch 1 Patch Transdermal every 24 hours  pantoprazole    Tablet 40 milliGRAM(s) Oral before breakfast  potassium chloride    Tablet ER 40 milliEquivalent(s) Oral every 4 hours  senna 2 Tablet(s) Oral at bedtime  tamsulosin 0.4 milliGRAM(s) Oral two times a day    MEDICATIONS  (PRN):  acetaminophen   Tablet .. 650 milliGRAM(s) Oral every 6 hours PRN Temp greater or equal to 38C (100.4F), Mild Pain (1 - 3)  albuterol/ipratropium for Nebulization 3 milliLiter(s) Nebulizer every 6 hours PRN Shortness of Breath and/or Wheezing  ALPRAZolam 0.25 milliGRAM(s) Oral at bedtime PRN insomnia      PHYSICAL EXAM:  Vital Signs Last 24 Hrs  T(C): 36.6 (03 Nov 2020 07:00), Max: 36.6 (03 Nov 2020 07:00)  T(F): 97.8 (03 Nov 2020 07:00), Max: 97.8 (03 Nov 2020 07:00)  HR: 62 (03 Nov 2020 12:00) (62 - 71)  BP: 108/71 (03 Nov 2020 12:00) (104/55 - 126/69)  BP(mean): 84 (03 Nov 2020 12:00) (71 - 89)  RR: 16 (03 Nov 2020 12:00) (12 - 21)  SpO2: 100% (03 Nov 2020 12:00) (94% - 100%)  General: alert  ,converses, interactive       HEENT: n/c, a/t     Lungs: few coarse bs    CV: normal rate    GI: normal    Musculoskeletal: normal w/ weakness  Skin: w/d     Neuro: some deficits   Oral intake ability:  oral feeding w/ assist   Diet: soft as param      LABS:                          7.8    7.67  )-----------( 176      ( 03 Nov 2020 06:00 )             25.2     11-03    141  |  102  |  18  ----------------------------<  114<H>  2.8<LL>   |  31  |  1.35<H>    Ca    8.7      03 Nov 2020 06:00  Phos  3.1     11-03  Mg     2.1     11-03    TPro  5.7<L>  /  Alb  2.0<L>  /  TBili  0.2  /  DBili  x   /  AST  13  /  ALT  9<L>  /  AlkPhos  95  11-03        RADIOLOGY & ADDITIONAL STUDIES:< from: Xray Chest 1 View-PORTABLE IMMEDIATE (Xray Chest 1 View-PORTABLE IMMEDIATE .) (11.02.20 @ 08:54) >  EXAM:  XR CHEST PORTABLE IMMED 1V      PROCEDURE DATE:  11/02/2020        INTERPRETATION:  DATE OF STUDY: 11/2/2020    PRIOR:10/31/20 plain chest; had CT angio of chest done 10/28/20    CLINICAL INDICATION: Hypoxic. Assess pleural effusion.    TECHNIQUE: portable chest.    FINDINGS:  The study limited by patient positioning.  Thoracic aortic atheromatous changes and ectasia are stable.  S/P TEVAR procedure.  Stable cardiomegaly.  Mediastinum is within normal limits.  Persistent mild vascular congestive changes.  Persistent small bilateral pleural effusions, right more than left - with associated bibasilar atelectases.  No pneumothorax.  No acute bony finding.    IMPRESSION:  1. Stable mild pulmonary vascular congestive changes.  2. Stable mild bilateral pleural effusions.                ADVANCE DIRECTIVES: molst dnr/dni - bi-pap ok , no feeding tube   Advanced Care Planning discussion total time spent:

## 2020-11-03 NOTE — PROGRESS NOTE ADULT - ASSESSMENT
Physical Examination:  GENERAL:               Alert, Oriented, No acute distress.  frail male  PULM:                     Bilateral air entry but diminished on Right, no wheezing, rales  CVS:                         S1, S2,  + Murmur  ABD:                        Soft, nondistended, nontender, normoactive bowel sounds,   EXT:                         No edema, nontender, No Cyanosis or Clubbing   Vascular:                Warm Extremities, Normal Capillary refill, Normal Distal Pulses  SKIN:                       Warm and well perfused, no rashes noted.   NEURO:                  Alert, oriented, interactive, follows commands  PSYC:                      Calm with episodes of anxiety , intermittent  Insight.      Assessment:  1. Acute respiratory failure due to suspected volume overload state ; now with Recurrent hypoxic respiratory failure on 11/2/20  2. Bilateral pleural effusion s/p Right pigtail chest tube - transudate x 2 with negative cytology x 2   3. COPD  4. Right upper lobe collapse   5. Coronary artery disease   6. Hypertension   7. Hypothyroidism  8. 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  - D/w Dr Benoit Carmona  - on 11/2 also believes in conservative managment.   - On abx and a/c for  LA mobile mass   - monitor CXR   - Cont. Diuresis with albumin for hypoalbuminemia  - cont. Symbicort and nebs  - Supplement electrolytes  - Oxygen support to maintain saturation > 90%  - Cont. thyroid medications and BPH medications  - Prognosis is poor, given recurrent hospitalizations, made DNR/I on 11/2 MOLST filled out   - Remains with poor oral intake  - DTR Kaur - [phone] - 11/3/20  - Palliative care f/u   - transfer to

## 2020-11-03 NOTE — PROGRESS NOTE ADULT - SUBJECTIVE AND OBJECTIVE BOX
Follow-up Critical Care Progress Note  Chief Complaint : Hypoxemia    pt feels better  anxious  no cp, sob this am  No overnight events noted        Allergies :No Known Allergies      PAST MEDICAL & SURGICAL HISTORY:  Anemia    Cardiac arrhythmia    Anxiety    COPD (chronic obstructive pulmonary disease)    Aortic aneurysm    Heart disease  sp cardiac cath with stent placement    BPH (benign prostatic hyperplasia)    Hypertension    S/P coronary artery stent placement  1 stent- blocked artery        Medications:  MEDICATIONS  (STANDING):  aMIOdarone    Tablet 200 milliGRAM(s) Oral daily  amLODIPine   Tablet 10 milliGRAM(s) Oral daily  ampicillin  IVPB 2 Gram(s) IV Intermittent every 6 hours  bisacodyl Suppository 10 milliGRAM(s) Rectal daily  budesonide 160 MICROgram(s)/formoterol 4.5 MICROgram(s) Inhaler 2 Puff(s) Inhalation two times a day  cefTRIAXone   IVPB 2000 milliGRAM(s) IV Intermittent every 12 hours  enoxaparin Injectable 60 milliGRAM(s) SubCutaneous every 12 hours  furosemide   Injectable 40 milliGRAM(s) IV Push every 12 hours  influenza   Vaccine 0.5 milliLiter(s) IntraMuscular once  levothyroxine 100 MICROGram(s) Oral daily  lidocaine   Patch 1 Patch Transdermal every 24 hours  pantoprazole    Tablet 40 milliGRAM(s) Oral before breakfast  potassium chloride    Tablet ER 40 milliEquivalent(s) Oral every 4 hours  senna 2 Tablet(s) Oral at bedtime  tamsulosin 0.4 milliGRAM(s) Oral two times a day    MEDICATIONS  (PRN):  acetaminophen   Tablet .. 650 milliGRAM(s) Oral every 6 hours PRN Temp greater or equal to 38C (100.4F), Mild Pain (1 - 3)  albuterol/ipratropium for Nebulization 3 milliLiter(s) Nebulizer every 6 hours PRN Shortness of Breath and/or Wheezing  ALPRAZolam 0.25 milliGRAM(s) Oral at bedtime PRN insomnia    COVID  10-24-20 @ 21:00  COVID -   NotDetec  09-18-20 @ 16:30  COVID -   NotDetec      COVID Biomarkers    10-28-20 @ 06:00 ESR --  ---  CRP --  ---  DDimer  --   ---      ---   Ferritin --    09-22-20 @ 06:08 ESR --  ---  CRP --  ---  DDimer  --   ---      ---   Ferritin --          Covid 19 IgG ab Index/Interpretation 10-25-20 @ 05:00 0.08 / Negative  Covid 19 IgG ab Index/Interpretation 09-18-20 @ 20:45 <0.10 / Negative    Trend Cardiac Enzymes    Trend BNP  10-24-20 @ 20:28   -  3345<H>    Procalcitonin Trend    WBC Trend  11-03-20 @ 06:00   -  7.67  11-01-20 @ 05:50   -  8.11    H/H Trend  11-03-20 @ 06:00   -  7.8<L> / 25.2<L>  11-01-20 @ 05:50   -  8.0<L> / 27.0<L>    Platelet Trend  11-03-20 @ 06:00   -  176  11-01-20 @ 05:50   -  219    Trend Sodium  11-03-20 @ 06:00   -  141  11-01-20 @ 05:50   -  142    Trend Potassium  11-03-20 @ 06:00   -  2.8<LL>  11-01-20 @ 05:50   -  3.4<L>    Trend Bun/Cr  11-03-20 @ 06:00  BUN/CR -  18 / 1.35<H>  11-01-20 @ 05:50  BUN/CR -  18 / 1.26    Lactic Acid Trend  10-24-20 @ 20:28   -   0.8        LABS:                        7.8    7.67  )-----------( 176      ( 03 Nov 2020 06:00 )             25.2     11-03    141  |  102  |  18  ----------------------------<  114<H>  2.8<LL>   |  31  |  1.35<H>    Ca    8.7      03 Nov 2020 06:00  Phos  3.1     11-03  Mg     2.1     11-03    TPro  5.7<L>  /  Alb  2.0<L>  /  TBili  0.2  /  DBili  x   /  AST  13  /  ALT  9<L>  /  AlkPhos  95  11-03            CULTURES: (if applicable)    Culture - Blood (collected 10-31-20 @ 16:30)  Source: .Blood Blood-Venous  Preliminary Report (11-01-20 @ 21:02):    No growth to date.    Culture - Blood (collected 10-31-20 @ 16:30)  Source: .Blood Blood-Peripheral  Preliminary Report (11-01-20 @ 21:02):    No growth to date.    Culture - Blood (collected 10-29-20 @ 06:20)  Source: .Blood Blood-Venous  Gram Stain (10-31-20 @ 12:47):    Growth in aerobic bottle: Gram positive cocci in pairs    Growth in anaerobic bottle: Gram positive cocci in pairs  Final Report (11-02-20 @ 13:00):    Growth in aerobic and anaerobic bottles: Enterococcus faecalis    "Due to technical problems, Proteus sp. will Not be reported as part of    the BCID panel until further notice"    ***Blood Panel PCR results on this specimen are available    approximately 3 hours after the Gram stain result.***    Gram stain, PCR, and/or culture results may not always    correspond due to difference in methodologies.    ************************************************************    This PCR assay was performed using [facility].    The following targets are tested for: Enterococcus,    vancomycin resistant enterococci, Listeria monocytogenes,    coagulase negative staphylococci, S. aureus,    methicillin resistant S. aureus, Streptococcus agalactiae    (Group B), S. pneumoniae, S. pyogenes (Group A),    Acinetobacter baumannii, Enterobacter cloacae, E. coli,    Klebsiella oxytoca, K. pneumoniae, Proteus sp.,    Serratia marcescens, Haemophilus influenzae,    Neisseria meningitidis, Pseudomonas aeruginosa, Candida    albicans, C. glabrata, C krusei, C parapsilosis,    C. tropicalis and the KPC resistance gene.  Organism: Blood Culture PCR  Enterococcus faecalis (11-02-20 @ 13:00)  Organism: Enterococcus faecalis (11-02-20 @ 13:00)      -  Ampicillin: S <=2 Predicts results to ampicillin/sulbactam, amoxacillin-clavulanate and  piperacillin-tazobactam.      -  Gentamicin synergy: R      -  Vancomycin: S 2      Method Type: COLTEN  Organism: Blood Culture PCR (11-02-20 @ 13:00)      -  Enterococcus species: Detec      Method Type: PCR    Culture - Blood (collected 10-29-20 @ 06:20)  Source: .Blood Blood-Peripheral  Gram Stain (10-31-20 @ 04:51):    Growth in anaerobic bottle: Gram Positive Cocci in Pairs and Chains    Growth in aerobic bottle: Gram Positive Cocci in Pairs and Chains  Final Report (11-02-20 @ 08:57):    Growth in aerobic and anaerobic bottles: Enterococcus faecalis  Organism: Enterococcus faecalis (11-02-20 @ 08:57)  Organism: Enterococcus faecalis (11-02-20 @ 08:57)      -  Ampicillin: S <=2 Predicts results to ampicillin/sulbactam, amoxacillin-clavulanate and  piperacillin-tazobactam.      -  Gentamicin synergy: R      -  Vancomycin: S 2      Method Type: COLTEN    Culture - Fungal, Body Fluid (collected 10-27-20 @ 12:15)  Source: Pleural Fl Pleural Fluid  Preliminary Report (10-28-20 @ 08:13):    Testing in progress    Culture - Body Fluid with Gram Stain (collected 10-27-20 @ 12:15)  Source: Pleural Fl Pleural Fluid  Gram Stain (10-27-20 @ 23:20):    polymorphonuclear leukocytes seen    No organisms seen    by cytocentrifuge  Final Report (11-01-20 @ 15:04):    No growth    Culture - Blood (collected 10-27-20 @ 05:20)  Source: .Blood Blood-Venous  Gram Stain (10-28-20 @ 02:51):    Growth in aerobic bottle: Gram Positive Cocci in Pairs and Chains    Growth in anaerobic bottle: Gram Positive Cocci in Pairs and Chains  Final Report (10-28-20 @ 21:15):    Growth in aerobic and anaerobic bottles: Enterococcus faecalis    See previous culture [id number]    Culture - Blood (collected 10-27-20 @ 05:20)  Source: .Blood Blood-Peripheral  Gram Stain (10-28-20 @ 03:37):    Growth in anaerobic bottle: Gram Positive Cocci in Pairs and Chains    Growth in aerobic bottle: Gram Positive Cocci in Pairs and Chains  Final Report (10-28-20 @ 22:37):    Growth in aerobic and anaerobic bottles: Enterococcus faecalis    See previous culture [id number]    Culture - Urine (collected 10-24-20 @ 22:15)  Source: .Urine Clean Catch (Midstream)  Final Report (10-26-20 @ 07:55):    <10,000 CFU/mL Normal Urogenital Tari    Culture - Blood (collected 10-24-20 @ 20:28)  Source: .Blood Blood-Peripheral  Gram Stain (10-25-20 @ 16:04):    Growth in aerobic and anaerobic bottles: Gram Positive Cocci in Pairs and    Chains  Final Report (10-27-20 @ 14:11):    Growth in aerobic and anaerobic bottles: Enterococcus faecalis    See previous culture [id number]    Culture - Blood (collected 10-24-20 @ 20:28)  Source: .Blood Blood-Peripheral  Gram Stain (10-25-20 @ 16:01):    Growth in anaerobic bottle: Gram Positive Cocci in Pairs and Chains    Growth in aerobic bottle: Gram Positive Cocci in Pairs and Chains  Final Report (10-27-20 @ 14:11):    Growth in aerobic and anaerobic bottles: Enterococcus faecalis    "Due to technical problems, Proteus sp. will Not be reported as part of    the BCID panel until further notice" ***Blood Panel PCR results on this    specimen are available    approximately 3 hours after the Gram stain result.***    Gram stain, PCR, and/or culture results may not always    correspond due to difference in methodologies.    ************************************************************    This PCR assay was performed using [facility].    The following targets are tested for: Enterococcus,    vancomycin resistant enterococci, Listeria monocytogenes,    coagulase negative staphylococci, S. aureus,    methicillin resistant S. aureus, Streptococcus agalactiae    (Group B), S. pneumoniae, S. pyogenes (Group A),    Acinetobacter baumannii, Enterobacter cloacae, E. coli,    Klebsiella oxytoca, K. pneumoniae, Proteus sp.,    Serratia marcescens, Haemophilus influenzae,    Neisseria meningitidis, Pseudomonas aeruginosa, Candida    albicans, C. glabrata, C krusei, C parapsilosis,    C. tropicalis and the KPC resistance gene.  Organism: Blood Culture PCR  Enterococcus faecalis (10-27-20 @ 14:11)  Organism: Enterococcus faecalis (10-27-20 @ 14:11)      -  Ampicillin: S <=2 Predicts results to ampicillin/sulbactam, amoxacillin-clavulanate and  piperacillin-tazobactam.      -  Gentamicin synergy: R      -  Vancomycin: S 2      Method Type: COLTEN  Organism: Blood Culture PCR (10-27-20 @ 14:11)      -  Enterococcus species: Detec      Method Type: PCR          ABG - ( 02 Nov 2020 11:55 )  pH, Arterial: 7.45  pH, Blood: x     /  pCO2: 42    /  pO2: 199   / HCO3: x     / Base Excess: x     /  SaO2: 97                CAPILLARY BLOOD GLUCOSE          RADIOLOGY  CXR:  < from: Xray Chest 1 View-PORTABLE IMMEDIATE (Xray Chest 1 View-PORTABLE IMMEDIATE .) (11.02.20 @ 08:54) >    FINDINGS:  The study limited by patient positioning.  Thoracic aortic atheromatous changes and ectasia are stable.  S/P TEVAR procedure.  Stable cardiomegaly.  Mediastinum is within normal limits.  Persistent mild vascular congestive changes.  Persistent small bilateral pleural effusions, right more than left - with associated bibasilar atelectases.  No pneumothorax.  No acute bony finding.    IMPRESSION:  1. Stable mild pulmonary vascular congestive changes.  2. Stable mild bilateral pleural effusions.    < end of copied text >  :      VITALS:  T(C): 36.6 (11-03-20 @ 07:00), Max: 36.6 (11-02-20 @ 08:41)  T(F): 97.8 (11-03-20 @ 07:00), Max: 97.9 (11-02-20 @ 08:41)  HR: 64 (11-03-20 @ 07:00) (62 - 74)  BP: 114/75 (11-03-20 @ 07:00) (104/55 - 126/69)  BP(mean): 89 (11-03-20 @ 07:00) (71 - 89)  ABP: --  ABP(mean): --  RR: 16 (11-03-20 @ 07:00) (12 - 21)  SpO2: 100% (11-03-20 @ 07:00) (94% - 100%)  CVP(mm Hg): --  CVP(cm H2O): --    Ins and Outs     11-02-20 @ 07:01  -  11-03-20 @ 07:00  --------------------------------------------------------  IN: 1410 mL / OUT: 1933 mL / NET: -523 mL                I&O's Detail    02 Nov 2020 07:01  -  03 Nov 2020 07:00  --------------------------------------------------------  IN:    IV PiggyBack: 100 mL    IV PiggyBack: 350 mL    Oral Fluid: 960 mL  Total IN: 1410 mL    OUT:    Indwelling Catheter - Urethral (mL): 900 mL    Voided (mL): 1033 mL  Total OUT: 1933 mL    Total NET: -523 mL

## 2020-11-04 NOTE — CHART NOTE - NSCHARTNOTEFT_GEN_A_CORE
Nutrition Follow Up Note  Hospital Course (Per Electronic Medical Record):   Source: Medical Record [X]  Nursing Staff [X]     Diet: Puree with thins , Ensure Enlive BID     Patient noted with poor po , observed patient di not consume dinner meal last evening & po Ensure supplements at bedside , patient not consuming. Patient is noted with evidence of severe malnutrition as per MOLST , patient is not receptive to EN feeds, MD aware of poor po palliative notes reviewed.     Current Weight: (11/4) 135.1/61.3kg , weight noted fluctuating since admission    Pertinent Medications: MEDICATIONS  (STANDING):  aMIOdarone    Tablet 200 milliGRAM(s) Oral daily  amLODIPine   Tablet 10 milliGRAM(s) Oral daily  ampicillin  IVPB 2 Gram(s) IV Intermittent every 6 hours  Biotene Dry Mouth Oral Rinse 5 milliLiter(s) Swish and Spit every 12 hours  bisacodyl Suppository 10 milliGRAM(s) Rectal daily  budesonide 160 MICROgram(s)/formoterol 4.5 MICROgram(s) Inhaler 2 Puff(s) Inhalation two times a day  enoxaparin Injectable 60 milliGRAM(s) SubCutaneous every 12 hours  furosemide   Injectable 40 milliGRAM(s) IV Push every 12 hours  influenza   Vaccine 0.5 milliLiter(s) IntraMuscular once  levothyroxine 100 MICROGram(s) Oral daily  lidocaine   Patch 1 Patch Transdermal every 24 hours  pantoprazole    Tablet 40 milliGRAM(s) Oral before breakfast  senna 2 Tablet(s) Oral at bedtime  tamsulosin 0.4 milliGRAM(s) Oral two times a day    MEDICATIONS  (PRN):  acetaminophen   Tablet .. 650 milliGRAM(s) Oral every 6 hours PRN Temp greater or equal to 38C (100.4F), Mild Pain (1 - 3)  albuterol/ipratropium for Nebulization 3 milliLiter(s) Nebulizer every 6 hours PRN Shortness of Breath and/or Wheezing  ALPRAZolam 0.25 milliGRAM(s) Oral at bedtime PRN insomnia      Pertinent Labs:  11-04 Na142 mmol/L Glu 73 mg/dL K+ 3.8 mmol/L Cr  1.39 mg/dL<H> BUN 18 mg/dL 11-04 Phos 3.2 mg/dL 11-04 Alb 2.0 g/dL<L>        Skin: Multiple DTI's noted , Stage I sacrum     Edema: none    Last BM: on (11/3)     Estimated Needs:   [X] No Change since Previous Assessment      Previous Nutrition Diagnosis: Severe Malnutrition     Nutrition Diagnosis is [X] Ongoing       New Nutrition Diagnosis: [X] Not Applicable    Interventions:   1. Recommend continue current regimen    2. ? hospice eval due to poor po,  noted malnutrion & noted poor prognosis    Monitoring & Evaluation: will monitor:  [X] Weights   [X] PO Intake   [X] Follow Up (Per Protocol)  [X] Tolerance to Diet Prescription       RD to follow as per Nutrition protocol  Samira Shaffer RDN

## 2020-11-04 NOTE — PROGRESS NOTE ADULT - SUBJECTIVE AND OBJECTIVE BOX
Follow-up Critical Care Progress Note  Chief Complaint : Hypoxemia      pt alert, comfortable  no complaints  has cough  has not eating breakfast or dinner  has coke can bedside he drinks      Allergies :No Known Allergies      PAST MEDICAL & SURGICAL HISTORY:  Anemia    Cardiac arrhythmia    Anxiety    COPD (chronic obstructive pulmonary disease)    Aortic aneurysm    Heart disease  sp cardiac cath with stent placement    BPH (benign prostatic hyperplasia)    Hypertension    S/P coronary artery stent placement  1 stent- blocked artery        Medications:  MEDICATIONS  (STANDING):  aMIOdarone    Tablet 200 milliGRAM(s) Oral daily  amLODIPine   Tablet 10 milliGRAM(s) Oral daily  ampicillin  IVPB 2 Gram(s) IV Intermittent every 6 hours  Biotene Dry Mouth Oral Rinse 5 milliLiter(s) Swish and Spit every 12 hours  bisacodyl Suppository 10 milliGRAM(s) Rectal daily  budesonide 160 MICROgram(s)/formoterol 4.5 MICROgram(s) Inhaler 2 Puff(s) Inhalation two times a day  enoxaparin Injectable 60 milliGRAM(s) SubCutaneous every 12 hours  furosemide   Injectable 40 milliGRAM(s) IV Push every 12 hours  influenza   Vaccine 0.5 milliLiter(s) IntraMuscular once  levothyroxine 100 MICROGram(s) Oral daily  lidocaine   Patch 1 Patch Transdermal every 24 hours  pantoprazole    Tablet 40 milliGRAM(s) Oral before breakfast  senna 2 Tablet(s) Oral at bedtime  tamsulosin 0.4 milliGRAM(s) Oral two times a day    MEDICATIONS  (PRN):  acetaminophen   Tablet .. 650 milliGRAM(s) Oral every 6 hours PRN Temp greater or equal to 38C (100.4F), Mild Pain (1 - 3)  albuterol/ipratropium for Nebulization 3 milliLiter(s) Nebulizer every 6 hours PRN Shortness of Breath and/or Wheezing  ALPRAZolam 0.25 milliGRAM(s) Oral at bedtime PRN insomnia    COVID  10-24-20 @ 21:00  COVID -   NotDetec  09-18-20 @ 16:30  COVID -   NotDetec      COVID Biomarkers    11-04-20 @ 05:00 ESR --  ---  CRP --  ---  DDimer  3496<H>   ---   LDH --   ---   Ferritin --    10-28-20 @ 06:00 ESR --  ---  CRP --  ---  DDimer  --   ---      ---   Ferritin --    09-22-20 @ 06:08 ESR --  ---  CRP --  ---  DDimer  --   ---      ---   Ferritin --          Covid 19 IgG ab Index/Interpretation 10-25-20 @ 05:00 0.08 / Negative  Covid 19 IgG ab Index/Interpretation 09-18-20 @ 20:45 <0.10 / Negative    Trend Cardiac Enzymes    Trend BNP  11-04-20 @ 05:00   -  1113<H>  10-24-20 @ 20:28   -  3345<H>    Procalcitonin Trend  11-04-20 @ 05:00   -   0.40<H>    WBC Trend  11-04-20 @ 05:00   -  8.22  11-03-20 @ 06:00   -  7.67    H/H Trend  11-04-20 @ 05:00   -  8.3<L> / 27.0<L>  11-03-20 @ 06:00   -  7.8<L> / 25.2<L>    Platelet Trend  11-04-20 @ 05:00   -  199  11-03-20 @ 06:00   -  176    Trend Sodium  11-04-20 @ 05:00   -  142  11-03-20 @ 16:00   -  140  11-03-20 @ 06:00   -  141    Trend Potassium  11-04-20 @ 05:00   -  3.8  11-03-20 @ 16:00   -  3.6  11-03-20 @ 06:00   -  2.8<LL>    Trend Bun/Cr  11-04-20 @ 05:00  BUN/CR -  18 / 1.39<H>  11-03-20 @ 16:00  BUN/CR -  18 / 1.46<H>  11-03-20 @ 06:00  BUN/CR -  18 / 1.35<H>    Lactic Acid Trend  10-24-20 @ 20:28   -   0.8    ABG Trend  11-02-20 @ 11:55   - 7.45/42/199<H>/97<H>  11-02-20 @ 08:00   - 7.42/47<H>/62<L>/90<L>  10-25-20 @ 05:01   - 7.41/37/79/94  10-24-20 @ 20:30   - 7.41/39/56<L>/85<L>    Trend AST/ALT/ALK Phos/Bili  11-04-20 @ 05:00   11/10/100/0.2  11-03-20 @ 06:00   13/9<L>/95/0.2  10-29-20 @ 06:15   11/18/94/0.4  10-27-20 @ 05:20   11/15/103/0.3  10-24-20 @ 20:28   28/20/140<H>/0.4  09-18-20 @ 11:34   24/27/101/0.4        LABS:                        8.3    8.22  )-----------( 199      ( 04 Nov 2020 05:00 )             27.0     11-04    142  |  104  |  18  ----------------------------<  73  3.8   |  29  |  1.39<H>    Ca    9.0      04 Nov 2020 05:00  Phos  3.2     11-04  Mg     2.1     11-04    TPro  5.7<L>  /  Alb  2.0<L>  /  TBili  0.2  /  DBili  x   /  AST  11  /  ALT  10  /  AlkPhos  100  11-04      PT/INR - ( 04 Nov 2020 05:00 )   PT: 13.9 sec;   INR: 1.16 ratio         PTT - ( 04 Nov 2020 05:00 )  PTT:48.7 sec    Procalcitonin, Serum: 0.40 ng/mL (11-04-20 @ 05:00)    Serum Pro-Brain Natriuretic Peptide: 1113 pg/mL (11-04-20 @ 05:00)        CULTURES: (if applicable)    Culture - Blood (collected 11-02-20 @ 15:40)  Source: .Blood Blood  Preliminary Report (11-03-20 @ 22:01):    No growth to date.    Culture - Blood (collected 11-02-20 @ 15:00)  Source: .Blood Blood  Preliminary Report (11-03-20 @ 22:01):    No growth to date.    Culture - Blood (collected 10-31-20 @ 16:30)  Source: .Blood Blood-Venous  Preliminary Report (11-01-20 @ 21:02):    No growth to date.    Culture - Blood (collected 10-31-20 @ 16:30)  Source: .Blood Blood-Peripheral  Preliminary Report (11-01-20 @ 21:02):    No growth to date.    Culture - Blood (collected 10-29-20 @ 06:20)  Source: .Blood Blood-Venous  Gram Stain (10-31-20 @ 12:47):    Growth in aerobic bottle: Gram positive cocci in pairs    Growth in anaerobic bottle: Gram positive cocci in pairs  Final Report (11-02-20 @ 13:00):    Growth in aerobic and anaerobic bottles: Enterococcus faecalis    "Due to technical problems, Proteus sp. will Not be reported as part of    the BCID panel until further notice"    ***Blood Panel PCR results on this specimen are available    approximately 3 hours after the Gram stain result.***    Gram stain, PCR, and/or culture results may not always    correspond due to difference in methodologies.    ************************************************************    This PCR assay was performed using [employer].    The following targets are tested for: Enterococcus,    vancomycin resistant enterococci, Listeria monocytogenes,    coagulase negative staphylococci, S. aureus,    methicillin resistant S. aureus, Streptococcus agalactiae    (Group B), S. pneumoniae, S. pyogenes (Group A),    Acinetobacter baumannii, Enterobacter cloacae, E. coli,    Klebsiella oxytoca, K. pneumoniae, Proteus sp.,    Serratia marcescens, Haemophilus influenzae,    Neisseria meningitidis, Pseudomonas aeruginosa, Candida    albicans, C. glabrata, C krusei, C parapsilosis,    C. tropicalis and the KPC resistance gene.  Organism: Blood Culture PCR  Enterococcus faecalis (11-02-20 @ 13:00)  Organism: Enterococcus faecalis (11-02-20 @ 13:00)      -  Ampicillin: S <=2 Predicts results to ampicillin/sulbactam, amoxacillin-clavulanate and  piperacillin-tazobactam.      -  Gentamicin synergy: R      -  Vancomycin: S 2      Method Type: COLTEN  Organism: Blood Culture PCR (11-02-20 @ 13:00)      -  Enterococcus species: Detec      Method Type: PCR    Culture - Blood (collected 10-29-20 @ 06:20)  Source: .Blood Blood-Peripheral  Gram Stain (10-31-20 @ 04:51):    Growth in anaerobic bottle: Gram Positive Cocci in Pairs and Chains    Growth in aerobic bottle: Gram Positive Cocci in Pairs and Chains  Final Report (11-02-20 @ 08:57):    Growth in aerobic and anaerobic bottles: Enterococcus faecalis  Organism: Enterococcus faecalis (11-02-20 @ 08:57)  Organism: Enterococcus faecalis (11-02-20 @ 08:57)      -  Ampicillin: S <=2 Predicts results to ampicillin/sulbactam, amoxacillin-clavulanate and  piperacillin-tazobactam.      -  Gentamicin synergy: R      -  Vancomycin: S 2      Method Type: COLTEN    Culture - Fungal, Body Fluid (collected 10-27-20 @ 12:15)  Source: Pleural Fl Pleural Fluid  Preliminary Report (10-28-20 @ 08:13):    Testing in progress    Culture - Body Fluid with Gram Stain (collected 10-27-20 @ 12:15)  Source: Pleural Fl Pleural Fluid  Gram Stain (10-27-20 @ 23:20):    polymorphonuclear leukocytes seen    No organisms seen    by cytocentrifuge  Final Report (11-01-20 @ 15:04):    No growth    Culture - Blood (collected 10-27-20 @ 05:20)  Source: .Blood Blood-Venous  Gram Stain (10-28-20 @ 02:51):    Growth in aerobic bottle: Gram Positive Cocci in Pairs and Chains    Growth in anaerobic bottle: Gram Positive Cocci in Pairs and Chains  Final Report (10-28-20 @ 21:15):    Growth in aerobic and anaerobic bottles: Enterococcus faecalis    See previous culture [id number]    Culture - Blood (collected 10-27-20 @ 05:20)  Source: .Blood Blood-Peripheral  Gram Stain (10-28-20 @ 03:37):    Growth in anaerobic bottle: Gram Positive Cocci in Pairs and Chains    Growth in aerobic bottle: Gram Positive Cocci in Pairs and Chains  Final Report (10-28-20 @ 22:37):    Growth in aerobic and anaerobic bottles: Enterococcus faecalis    See previous culture [id number]    Culture - Urine (collected 10-24-20 @ 22:15)  Source: .Urine Clean Catch (Midstream)  Final Report (10-26-20 @ 07:55):    <10,000 CFU/mL Normal Urogenital Tari    Culture - Blood (collected 10-24-20 @ 20:28)  Source: .Blood Blood-Peripheral  Gram Stain (10-25-20 @ 16:04):    Growth in aerobic and anaerobic bottles: Gram Positive Cocci in Pairs and    Chains  Final Report (10-27-20 @ 14:11):    Growth in aerobic and anaerobic bottles: Enterococcus faecalis    See previous culture [id number]    Culture - Blood (collected 10-24-20 @ 20:28)  Source: .Blood Blood-Peripheral  Gram Stain (10-25-20 @ 16:01):    Growth in anaerobic bottle: Gram Positive Cocci in Pairs and Chains    Growth in aerobic bottle: Gram Positive Cocci in Pairs and Chains  Final Report (10-27-20 @ 14:11):    Growth in aerobic and anaerobic bottles: Enterococcus faecalis    "Due to technical problems, Proteus sp. will Not be reported as part of    the BCID panel until further notice" ***Blood Panel PCR results on this    specimen are available    approximately 3 hours after the Gram stain result.***    Gram stain, PCR, and/or culture results may not always    correspond due to difference in methodologies.    ************************************************************    This PCR assay was performed using [employer].    The following targets are tested for: Enterococcus,    vancomycin resistant enterococci, Listeria monocytogenes,    coagulase negative staphylococci, S. aureus,    methicillin resistant S. aureus, Streptococcus agalactiae    (Group B), S. pneumoniae, S. pyogenes (Group A),    Acinetobacter baumannii, Enterobacter cloacae, E. coli,    Klebsiella oxytoca, K. pneumoniae, Proteus sp.,    Serratia marcescens, Haemophilus influenzae,    Neisseria meningitidis, Pseudomonas aeruginosa, Candida    albicans, C. glabrata, C krusei, C parapsilosis,    C. tropicalis and the KPC resistance gene.  Organism: Blood Culture PCR  Enterococcus faecalis (10-27-20 @ 14:11)  Organism: Enterococcus faecalis (10-27-20 @ 14:11)      -  Ampicillin: S <=2 Predicts results to ampicillin/sulbactam, amoxacillin-clavulanate and  piperacillin-tazobactam.      -  Gentamicin synergy: R      -  Vancomycin: S 2      Method Type: COLTEN  Organism: Blood Culture PCR (10-27-20 @ 14:11)      -  Enterococcus species: Detec      Method Type: PCR          ABG - ( 02 Nov 2020 11:55 )  pH, Arterial: 7.45  pH, Blood: x     /  pCO2: 42    /  pO2: 199   / HCO3: x     / Base Excess: x     /  SaO2: 97              VITALS:  T(C): 36.3 (11-04-20 @ 06:00), Max: 36.4 (11-03-20 @ 18:00)  T(F): 97.3 (11-04-20 @ 06:00), Max: 97.5 (11-03-20 @ 18:00)  HR: 73 (11-04-20 @ 08:00) (62 - 76)  BP: 106/63 (11-04-20 @ 08:00) (96/66 - 124/78)  BP(mean): 77 (11-04-20 @ 08:00) (67 - 93)  ABP: --  ABP(mean): --  RR: 19 (11-04-20 @ 08:00) (13 - 21)  SpO2: 95% (11-04-20 @ 08:00) (95% - 100%)  CVP(mm Hg): --  CVP(cm H2O): --    Ins and Outs     11-03-20 @ 07:01  -  11-04-20 @ 07:00  --------------------------------------------------------  IN: 640 mL / OUT: 790 mL / NET: -150 mL                I&O's Detail    03 Nov 2020 07:01  -  04 Nov 2020 07:00  --------------------------------------------------------  IN:    IV PiggyBack: 300 mL    IV PiggyBack: 100 mL    Oral Fluid: 240 mL  Total IN: 640 mL    OUT:    Indwelling Catheter - Urethral (mL): 740 mL    Voided (mL): 50 mL  Total OUT: 790 mL    Total NET: -150 mL

## 2020-11-04 NOTE — PROGRESS NOTE ADULT - ASSESSMENT
Physical Examination:  GENERAL:               Alert, Oriented, No acute distress.  frail male  HEENT                     dry mm, reddens of oral pharynx   PULM:                     Bilateral air entry but diminished on Right, no wheezing, rales  CVS:                         S1, S2,  + Murmur  ABD:                        Soft, nondistended, nontender, normoactive bowel sounds,   EXT:                         No edema, nontender, No Cyanosis or Clubbing   Vascular:                Warm Extremities, Normal Capillary refill, Normal Distal Pulses  SKIN:                       Warm and well perfused, no rashes noted.   NEURO:                  Alert, oriented, interactive, follows commands  PSYC:                      Calm with episodes of anxiety , intermittent  Insight.      Assessment:  1. Acute respiratory failure due to suspected volume overload state ; now with Recurrent hypoxic respiratory failure on 11/2/20  2. Bilateral pleural effusion s/p Right pigtail chest tube - transudate x 2 with negative cytology x 2   3. COPD  4. Right upper lobe collapse   5. Coronary artery disease   6. Hypertension   7. Hypothyroidism  8. 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  - D/w Dr Benoit Carmona  - on 11/2 also believes in conservative managment. ok with Anticoagulation with lovenox  - On abx and a/c for  LA mobile mass as per ID  - A/c with lovenox for elevated didmer and mobile LA mass - can be vegetation or clot   - monitor CXR   - Will make lasix daily   - cont. Symbicort and nebs  - Supplement electrolytes  - Oxygen support to maintain saturation > 90%  - Continue thyroid medications and BPH medications  - Prognosis is poor, given recurrent hospitalizations, made DNR/I on 11/2 [facility] filled out   - PT eval, OOB  - Remains with poor oral intake  - will d/c symbicort as patient has feeling of odaynophagia    will give MDI Albuterol instead.   - DTR Kaur - [phone] - 11/4/20  - Palliative care f/u   - transfer to  when bed is available

## 2020-11-04 NOTE — PROGRESS NOTE ADULT - SUBJECTIVE AND OBJECTIVE BOX
Patient is a 80y old  Male who presents with a chief complaint of SOB/Hypoxia/Right lung white out/mucous plug (04 Nov 2020 20:01)      BRIEF HOSPITAL COURSE:       80M admitted to ICU initially with hypoxemic respiratory failure secondary to combination of large right sided pleural effusion as well as significant mucus plug, with likely underlying PNA. Patient required HFNC with high FIO2, he was transferred out of ICU once stabalized. However recently he was noted to ahve an epsidoe of severe hypoxia, required replacement on NIPPV, and was transferred back to ICU. CXR performed tonight actually showed improvement in effusion, and right lung remains compeltely open.    -He is now DNR/DNI  -remains on treatment for enterococcal bacteremia.       PAST MEDICAL & SURGICAL HISTORY:  Anemia    Cardiac arrhythmia    Anxiety    COPD (chronic obstructive pulmonary disease)    Aortic aneurysm    Heart disease  sp cardiac cath with stent placement    BPH (benign prostatic hyperplasia)    Hypertension    S/P coronary artery stent placement  1 stent- blocked artery        Review of Systems:  -unable to obtain at this time. BiPAP/confusion      Medications:  ampicillin  IVPB 2 Gram(s) IV Intermittent every 6 hours    aMIOdarone    Tablet 200 milliGRAM(s) Oral daily  amLODIPine   Tablet 10 milliGRAM(s) Oral daily  furosemide   Injectable 40 milliGRAM(s) IV Push daily  tamsulosin 0.4 milliGRAM(s) Oral two times a day    ALBUTerol    90 MICROgram(s) HFA Inhaler 2 Puff(s) Inhalation every 6 hours  albuterol/ipratropium for Nebulization 3 milliLiter(s) Nebulizer every 6 hours PRN    acetaminophen   Tablet .. 650 milliGRAM(s) Oral every 6 hours PRN  ALPRAZolam 0.25 milliGRAM(s) Oral at bedtime PRN      enoxaparin Injectable 60 milliGRAM(s) SubCutaneous every 12 hours    bisacodyl Suppository 10 milliGRAM(s) Rectal daily  pantoprazole    Tablet 40 milliGRAM(s) Oral before breakfast  senna 2 Tablet(s) Oral at bedtime      levothyroxine 100 MICROGram(s) Oral daily      influenza   Vaccine 0.5 milliLiter(s) IntraMuscular once    Biotene Dry Mouth Oral Rinse 5 milliLiter(s) Swish and Spit every 12 hours  lidocaine   Patch 1 Patch Transdermal every 24 hours            ICU Vital Signs Last 24 Hrs  T(C): 36.3 (04 Nov 2020 19:00), Max: 36.3 (04 Nov 2020 00:00)  T(F): 97.3 (04 Nov 2020 19:00), Max: 97.3 (04 Nov 2020 00:00)  HR: 73 (04 Nov 2020 20:00) (67 - 79)  BP: 101/70 (04 Nov 2020 20:00) (93/53 - 121/66)  BP(mean): 81 (04 Nov 2020 20:00) (64 - 86)  RR: 14 (04 Nov 2020 20:00) (14 - 21)  SpO2: 100% (04 Nov 2020 20:00) (89% - 100%)          I&O's Detail    03 Nov 2020 07:01  -  04 Nov 2020 07:00  --------------------------------------------------------  IN:    IV PiggyBack: 300 mL    IV PiggyBack: 100 mL    Oral Fluid: 240 mL  Total IN: 640 mL    OUT:    Indwelling Catheter - Urethral (mL): 740 mL    Voided (mL): 50 mL  Total OUT: 790 mL    Total NET: -150 mL      04 Nov 2020 07:01  -  04 Nov 2020 20:26  --------------------------------------------------------  IN:    Oral Fluid: 150 mL  Total IN: 150 mL    OUT:    Indwelling Catheter - Urethral (mL): 400 mL  Total OUT: 400 mL    Total NET: -250 mL            LABS:                        8.3    8.22  )-----------( 199      ( 04 Nov 2020 05:00 )             27.0     11-04    142  |  104  |  18  ----------------------------<  73  3.8   |  29  |  1.39<H>    Ca    9.0      04 Nov 2020 05:00  Phos  3.2     11-04  Mg     2.1     11-04    TPro  5.7<L>  /  Alb  2.0<L>  /  TBili  0.2  /  DBili  x   /  AST  11  /  ALT  10  /  AlkPhos  100  11-04          CAPILLARY BLOOD GLUCOSE        PT/INR - ( 04 Nov 2020 05:00 )   PT: 13.9 sec;   INR: 1.16 ratio         PTT - ( 04 Nov 2020 05:00 )  PTT:48.7 sec    CULTURES:  Culture Results:   No growth to date. (11-02-20 @ 15:40)  Culture Results:   No growth to date. (11-02-20 @ 15:00)  Culture Results:   No growth to date. (10-31-20 @ 16:30)  Culture Results:   No growth to date. (10-31-20 @ 16:30)  Culture Results:   Growth in aerobic and anaerobic bottles: Enterococcus faecalis (10-29-20 @ 06:20)  Culture Results:   Growth in aerobic and anaerobic bottles: Enterococcus faecalis  "Due to technical problems, Proteus sp. will Not be reported as part of  the BCID panel until further notice"  ***Blood Panel PCR results on this specimen are available  approximately 3 hours after the Gram stain result.***  Gram stain, PCR, and/or culture results may not always  correspond due to difference in methodologies.  ************************************************************  This PCR assay was performed using [employer].  The following targets are tested for: Enterococcus,  vancomycin resistant enterococci, Listeria monocytogenes,  coagulase negative staphylococci, S. aureus,  methicillin resistant S. aureus, Streptococcus agalactiae  (Group B), S. pneumoniae, S. pyogenes (Group A),  Acinetobacter baumannii, Enterobacter cloacae, E. coli,  Klebsiella oxytoca, K. pneumoniae, Proteus sp.,  Serratia marcescens, Haemophilus influenzae,  Neisseria meningitidis, Pseudomonas aeruginosa, Candida  albicans, C. glabrata, C krusei, C parapsilosis,  C. tropicalis and the KPC resistance gene. (10-29-20 @ 06:20)      Physical Examination:    General: On BiPAP, intermittently confused.     HEENT: Pupils equal, reactive to light.  Symmetric.    PULM: rhonci noted bilaterally, no significant sputum production    CVS: Regular rate and rhythm, no murmurs, rubs, or gallops    ABD: Soft, nondistended, nontender, normoactive bowel sounds, no masses    EXT: No edema, nontender    SKIN: Warm and well perfused, no rashes noted.

## 2020-11-04 NOTE — PROGRESS NOTE ADULT - ASSESSMENT
80M admitted to ICU initially with hypoxemic respiratory failure secondary to combination of large right sided pleural effusion as well as significant mucus plug, with likely underlying PNA. Patient required HFNC with high FIO2, he was transferred out of ICU once stabalized. However recently he was noted to ahve an epsidoe of severe hypoxia, required replacement on NIPPV, and was transferred back to ICU. CXR performed tonight actually showed improvement in effusion, and right lung remains compeltely open.    -He is now DNR/DNI  -remains on treatment for enterococcal bacteremia.     PLAN;    #Acute hypoxic respiratory failure w/ large right plural effusion and mucous plug/atelectasis  -Patient currently on BiPAP  -will titrate IPAP and EPAP to maintain pH >7.30 and SaO2 >92%  -alarms reviewed  -NPO while on BiPAP   -continue duonebs and budesonide for COPD  -S/P pigtail CT for effusion which is now removed.  -diurese with lasix daily while monitoring lytes.   -continue with aggressive chest PT, pulmonary toilet, bed percussion, and IS use    #Enterococcal Bactermia:  -noted on blood cultures from 10/27/2020  -currently on  amipicillin, will need to compelte course, ID following    #Anemia   -patient Hemoglobin is 8.3 today   -no signs of bleeding, will trend Hb daily with AM labs, transfuse if Hb <7    #hypothyroid   - Patient received synthroid 100mcg, c/w current dosage     #BPH  - Patient on flomax 0.4mg twice daily. Will continue to prevent retention     #Dvt prophylaxis   -on full dose loveonx for elevated D-dimer and left atrial mass seen on ECHO.    #GI prophylaxis   - Protonix 40mg IVP once daily

## 2020-11-04 NOTE — MEDICAL STUDENT PROGRESS NOTE(EDUCATION) - SUBJECTIVE AND OBJECTIVE BOX
SUBJECTIVE:  Patient is a 80y old  Male who presents with a chief complaint of SOB/Hypoxia/Right lung white out/mucous plug (03 Nov 2020 13:09)    HPI:  80 year old Male, PMH HTN, CAD (s/p coronary stent placement), COPD, hypothyroidism, Aortic aneurysm, anemia, unknown cardiac arrhyhtmia, BPH, who presented to the ED from [facility] with SOB and hypoxia. In the ER, patient was on 100% NRB w/ SpO2 95%. CT scan showed significant mucous plugging of right mainstem bronchus, leading to near complete collapse/atelectasis of right lung (24 Oct 2020 23:55).    Significant recent/past 24 hr events:  Pt has worsening PO intake status.   Thick mucous secretions noted. Pulmonary toileting and chest PT in progress this am.    Code Status: DNR/DNI.    Review of Systems:      Constitutional: +Malaise. Denies fever, chills.  Respiratory: + mucous secretions/phlegm. Denies SOB, wheezing.  Cardiovascular: denies Chest pain.   Gastrointestinal: + decreased appetite. Denies nausea, vomiting, diarrhea, abdominal pain.   Neurologic: denies headache, dizziness.      PAST MEDICAL & SURGICAL HISTORY:  Anemia  Cardiac arrhythmia  Anxiety  COPD (chronic obstructive pulmonary disease)  Aortic aneurysm  Heart disease: s/p cardiac cath with coronary artery stent placement  BPH (benign prostatic hyperplasia)  Hypertension    Allergies:  No Known Allergies    MEDICATIONS  (STANDING):  aMIOdarone    Tablet 200 milliGRAM(s) Oral daily  amLODIPine   Tablet 10 milliGRAM(s) Oral daily  ampicillin  IVPB 2 Gram(s) IV Intermittent every 6 hours  Biotene Dry Mouth Oral Rinse 5 milliLiter(s) Swish and Spit every 12 hours  bisacodyl Suppository 10 milliGRAM(s) Rectal daily  budesonide 160 MICROgram(s)/formoterol 4.5 MICROgram(s) Inhaler 2 Puff(s) Inhalation two times a day  enoxaparin Injectable 60 milliGRAM(s) SubCutaneous every 12 hours  furosemide   Injectable 40 milliGRAM(s) IV Push every 12 hours  influenza   Vaccine 0.5 milliLiter(s) IntraMuscular once  levothyroxine 100 MICROGram(s) Oral daily  lidocaine   Patch 1 Patch Transdermal every 24 hours  pantoprazole    Tablet 40 milliGRAM(s) Oral before breakfast  senna 2 Tablet(s) Oral at bedtime  tamsulosin 0.4 milliGRAM(s) Oral two times a day    MEDICATIONS  (PRN):  acetaminophen   Tablet .. 650 milliGRAM(s) Oral every 6 hours PRN Temp greater or equal to 38C (100.4F), Mild Pain (1 - 3)  albuterol/ipratropium for Nebulization 3 milliLiter(s) Nebulizer every 6 hours PRN Shortness of Breath and/or Wheezing  ALPRAZolam 0.25 milliGRAM(s) Oral at bedtime PRN insomnia        OBJECTIVE:   ICU Vital Signs Last 24 Hrs  T(F): 97.3 (04 Nov 2020 06:00), Max: 97.5 (03 Nov 2020 18:00)  HR: 73 (04 Nov 2020 08:00) (62 - 76)  BP: 106/63 (04 Nov 2020 08:00) (96/66 - 124/78)  BP(mean): 77 (04 Nov 2020 08:00) (67 - 93)  RR: 19 (04 Nov 2020 08:00) (13 - 21)  SpO2: 95% (04 Nov 2020 08:00) (95% - 100%)      ABG - ( 02 Nov 2020 11:55 )  pH, Arterial: 7.45  pH, Blood: x     /  pCO2: 42    /  pO2: 199   / HCO3: x     / Base Excess: x     /  SaO2: 97        I&O's Detail  03 Nov 2020 07:01  -  04 Nov 2020 07:00  --------------------------------------------------------  IN:    IV PiggyBack: 300 mL    IV PiggyBack: 100 mL    Oral Fluid: 240 mL  Total IN: 640 mL    OUT:    Indwelling Catheter - Urethral (mL): 740 mL    Voided (mL): 50 mL  Total OUT: 790 mL    Total NET: -150 mL      LABS  COVID  10-24-20 @ 21:00  COVID -   NotDetec  09-18-20 @ 16:30  COVID -   NotDetec    COVID Biomarkers  11-04-20 @ 05:00 ESR --  ---  CRP --  ---  DDimer  3496<H>   ---   LDH --   ---   Ferritin --    10-28-20 @ 06:00 ESR --  ---  CRP --  ---  DDimer  --   ---      ---   Ferritin --    09-22-20 @ 06:08 ESR --  ---  CRP --  ---  DDimer  --   ---      ---   Ferritin --      Covid 19 IgG ab Index/Interpretation 10-25-20 @ 05:00 0.08 / Negative  Covid 19 IgG ab Index/Interpretation 09-18-20 @ 20:45 <0.10 / Negative    Trend BNP  11-04-20 @ 05:00   -  1113<H>  10-24-20 @ 20:28   -  3345<H>    Procalcitonin Trend  11-04-20 @ 05:00   -   0.40<H>    WBC Trend  11-04-20 @ 05:00   -  8.22  11-03-20 @ 06:00   -  7.67    H/H Trend  11-04-20 @ 05:00   -  8.3<L> / 27.0<L>  11-03-20 @ 06:00   -  7.8<L> / 25.2<L>    Platelet Trend  11-04-20 @ 05:00   -  199  11-03-20 @ 06:00   -  176    Trend Sodium  11-04-20 @ 05:00   -  142  11-03-20 @ 16:00   -  140  11-03-20 @ 06:00   -  141    Trend Potassium  11-04-20 @ 05:00   -  3.8  11-03-20 @ 16:00   -  3.6  11-03-20 @ 06:00   -  2.8<LL>    Trend Bun/Cr  11-04-20 @ 05:00  BUN/CR -  18 / 1.39<H>  11-03-20 @ 16:00  BUN/CR -  18 / 1.46<H>  11-03-20 @ 06:00  BUN/CR -  18 / 1.35<H>    Lactic Acid Trend  10-24-20 @ 20:28   -   0.8    ABG Trend  11-02-20 @ 11:55   - 7.45/42/199<H>/97<H>  11-02-20 @ 08:00   - 7.42/47<H>/62<L>/90<L>  10-25-20 @ 05:01   - 7.41/37/79/94  10-24-20 @ 20:30   - 7.41/39/56<L>/85<L>    Trend AST/ALT/ALK Phos/Bili  11-04-20 @ 05:00   11/10/100/0.2  11-03-20 @ 06:00   13/9<L>/95/0.2  10-29-20 @ 06:15   11/18/94/0.4  10-27-20 @ 05:20   11/15/103/0.3  10-24-20 @ 20:28   28/20/140<H>/0.4  09-18-20 @ 11:34   24/27/101/0.4    Albumin Trend  11-04-20 @ 05:00   -   2.0<L>  11-03-20 @ 06:00   -   2.0<L>  10-29-20 @ 06:15   -   2.9<L>  10-27-20 @ 05:20   -   2.9<L>  10-24-20 @ 20:28   -   1.4<L>  09-18-20 @ 11:34   -   2.0<L>    PTT - PT - INR Trend  11-04-20 @ 05:00   -   48.7<H> - 13.9<H> - 1.16  10-24-20 @ 20:28   -   39.9<H> - 14.0<H> - 1.17<H>  09-18-20 @ 11:34   -   -- - 13.2 - 1.10    Glucose Trend  11-04-20 @ 05:00   -  -- 73 --  11-03-20 @ 16:00   -  -- 124<H> --  11-03-20 @ 06:00   -  -- 114<H> --      Physical Exam:   Constitutional: Alert, oriented, frail Male, no acute distress.  Neck: No JVD.  Respiratory: Breath Sounds with rhonchi noted bilaterally to auscultation, No wheezing noted.  Cardiovascular: Regular rate, regular rhythm, normal S1, S2.  Gastrointestinal: Soft, non-tender, non distended, normoactive bowel sounds.  Extremities: Moving all extremities x 4, no peripheral edema, no cyanosis, no clubbing.   Vascular: Equal and normal pulses: 2+ peripheral pulses throughout.  Neurological: A+O x 3; interactive, answers questions, speech clear and intact; no sensory, motor deficit.  Psychiatric: Calm at this time, intermittent anxiety.  Skin: Warm, dry, well perfused, no rashes.      ASSESSMENT:  HPI: 80 year old Male, PMH HTN, CAD (s/p coronary stent placement), COPD, hypothyroidism, Aortic aneurysm, anemia, unknown cardiac arrhyhtmia, BPH, who presented to the ED from [facility] with SOB and hypoxia. In the ER, patient was on 100% NRB w/ SpO2 95%. CT scan showed significant mucous plugging of right mainstem bronchus, leading to near complete collapse/atelectasis of right lung (24 Oct 2020 23:55).      Problem List:  1. Acute Hypoxic Respiratory failure r/t R mucous plug, near complete R upper lobe collapse/atelectasis, and b/l pleural effusion. Required R sided pigtail chest tube. Right sided effusion was transudative x 2 with negative cytology x 2, R pigtail chest tube was removed 10/30.   2. Bacteremia: High grade enterococcus on Ampicillin and Ceftriaxone. TTE was negative for endocarditis, AA graft stable stent, but mobile LA mass noted.  3. Failure to Thrive  4. COPD  5. CAD  6. TTE with mild-moderate decreased systolic function/Grade 1 diastolic dysfunction.  7. HTN  8. Hypothyroidism  9. Anemia of Chronic disease      PLAN:  Neuro: Pt is awake, alert, no deficits. Continue to monitor mental status. Xanax PRN for anxiety, Lidoderm patch for back pain as needed.    Cardiac: TTE as noted above. BNP as noted. Continue Lasix, Amiodarone, Amlodipine. DDimer elevated today, continue to evaluate/monitor LA thrombus but not a candidate for intervention at this time. Cardiology consult if needed. Lovenox.     Pulmonary: Nasal cannula for hypoxic respiratory failure. Increased thick mucous secretions noted this, pulmonary toileting and chest PT in progress. Symbicort/Duonebs for COPD. Pending repeat CXR.    GI: Decreased PO intake noted. Encouraging oral intake with preferred foods and Ensure. Protonic ppx, Senna.    Renal: Replete K as needed.    Heme: Lovenox. Anemia of Chronic disease, stable.     ID: Bacteremia: High grade enterococcus on Ampicillin and Ceftriaxone. TTE was negative for endocarditis, AA graft stable stent, but mobile LA mass noted (elevated ddimer), thrombus vs vegetation. Procalcitonin elevated as noted. Follow blood cultures until negative for dispo planning.     Endo: Levothyroxine as at home.    GOC: Failure to Thrive, OOB with PT goal today. DNR/DNI.

## 2020-11-05 NOTE — PROGRESS NOTE ADULT - ASSESSMENT
Physical Examination:  GENERAL:               lethargic No acute distress.  frail male  HEENT                     dry mm, reddens of oral pharynx   PULM:                     Bilateral air entry but diminished on Right, no wheezing, rales  CVS:                         S1, S2,  + Murmur  ABD:                        Soft, nondistended, nontender, normoactive bowel sounds,   EXT:                         No edema, nontender, No Cyanosis or Clubbing   Vascular:                Warm Extremities, Normal Capillary refill, Normal Distal Pulses  SKIN:                       Warm and well perfused, no rashes noted.   NEURO:                  lethargic, mildly  interactive but  follows commands  PSYC:                      Calm with episodes of anxiety , intermittent  Insight.      Assessment:  1. Acute respiratory failure due to suspected volume overload state ; now with Recurrent hypoxic respiratory failure on 11/2/20 and 11/4  2. Bilateral pleural effusion s/p Right pigtail chest tube - transudate x 2 with negative cytology x 2   3. COPD  4. Right upper lobe collapse   5. Coronary artery disease   6. Hypertension   7. Hypothyroidism  8. Bacteremia - enterococcus faecalis with LA mobile mass at risk for endocarditis     Plan  - NIV QHS, N/C q day  - D/w Dr Benoit Carmona  - on 11/2 also believes in conservative managment. ok with Anticoagulation with lovenox/noac ok  - On abx and a/c for  LA mobile mass as per ID  - A/c with lovenox for elevated didmer and mobile LA mass - can be vegetation or clot   - monitor CXR grossly uncahged  - patient appears to be hypovolumic and continues to have b/l pl effusions  - cont. bronchodilators   - Supplement electrolytes  - Oxygen support to maintain saturation > 90%  - Continue thyroid medications and BPH medications  - Prognosis is poor, given recurrent hospitalizations, made DNR/I on 11/2 MOLST filled out   - PT eval, OOB  - Remains with poor oral intake  - will d/c symbicort as patient has feeling of odynophagia    will give MDI Albuterol instead.   - DTR Kaur Velazquez [phone] - 11/5/20 - msg left for call back  - Palliative care f/u   - transfer to  when bed is available

## 2020-11-05 NOTE — PROGRESS NOTE ADULT - SUBJECTIVE AND OBJECTIVE BOX
Progress: appearing weaker, po intake poor    Present Symptoms:   Dyspnea:   Nausea/Vomiting:   Anxiety:    Depressed Mood:   Fatigue: yes  Loss of appetite:   Pain:        no           location:   Review of Systems:  Unable to obtain due to fatigued  MEDICATIONS  (STANDING):  ALBUTerol    90 MICROgram(s) HFA Inhaler 2 Puff(s) Inhalation every 6 hours  aMIOdarone    Tablet 200 milliGRAM(s) Oral daily  amLODIPine   Tablet 10 milliGRAM(s) Oral daily  ampicillin  IVPB 2 Gram(s) IV Intermittent every 6 hours  Biotene Dry Mouth Oral Rinse 5 milliLiter(s) Swish and Spit every 12 hours  bisacodyl Suppository 10 milliGRAM(s) Rectal daily  enoxaparin Injectable 60 milliGRAM(s) SubCutaneous every 12 hours  furosemide   Injectable 40 milliGRAM(s) IV Push daily  influenza   Vaccine 0.5 milliLiter(s) IntraMuscular once  levothyroxine 100 MICROGram(s) Oral daily  lidocaine   Patch 1 Patch Transdermal every 24 hours  pantoprazole    Tablet 40 milliGRAM(s) Oral before breakfast  senna 2 Tablet(s) Oral at bedtime  tamsulosin 0.4 milliGRAM(s) Oral two times a day    MEDICATIONS  (PRN):  acetaminophen   Tablet .. 650 milliGRAM(s) Oral every 6 hours PRN Temp greater or equal to 38C (100.4F), Mild Pain (1 - 3)  albuterol/ipratropium for Nebulization 3 milliLiter(s) Nebulizer every 6 hours PRN Shortness of Breath and/or Wheezing      PHYSICAL EXAM:  Vital Signs Last 24 Hrs  T(C): 36.4 (05 Nov 2020 07:00), Max: 36.6 (05 Nov 2020 04:00)  T(F): 97.5 (05 Nov 2020 07:00), Max: 97.8 (05 Nov 2020 04:00)  HR: 70 (05 Nov 2020 11:00) (69 - 81)  BP: 126/74 (05 Nov 2020 11:00) (93/53 - 126/74)  BP(mean): 87 (05 Nov 2020 11:00) (64 - 89)  RR: 15 (05 Nov 2020 11:00) (14 - 24)  SpO2: 100% (05 Nov 2020 11:00) (89% - 100%)  General: frail weakened but alert  oriented x 1-2      HEENT: n/c, a/t + temporal wasting, mouth  dried bloody secretions     Lungs: few coarse, w/ rhonchi   CV: normal  rate  GI: soft, n/t     : normal  durand  Musculoskeletal: weakened    Skin: w/d fragile     Neuro: alert, oriented w/ periods confusion    Oral intake ability:  oral feeding needs  assist  Diet: soft, as param      LABS:                          8.3    8.76  )-----------( 186      ( 05 Nov 2020 05:00 )             27.0     11-05    138  |  102  |  19  ----------------------------<  71  3.6   |  29  |  1.34<H>    Ca    8.5      05 Nov 2020 05:00  Phos  3.3     11-05  Mg     2.1     11-05    TPro  5.7<L>  /  Alb  2.0<L>  /  TBili  0.2  /  DBili  x   /  AST  11  /  ALT  10  /  AlkPhos  100  11-04        RADIOLOGY & ADDITIONAL STUDIES:< from: Xray Chest 1 View- PORTABLE-Routine (Xray Chest 1 View- PORTABLE-Routine in AM.) (11.05.20 @ 06:50) >  EXAM:  XR CHEST PORTABLE ROUTINE 1V      PROCEDURE DATE:  11/05/2020        INTERPRETATION:  CLINICAL INDICATION: 80 years  Male with eval chf.    COMPARISON: 11/4/2020    The right hemithorax is partially omitted as is the left lung base. The patient is markedly rotated to the right. The lung apices are also partially omitted.    AP view of the chest demonstrates a left pleural effusion layering posteriorly. Cannot exclude underlying infiltrate or atelectasis. The visualized right lung is clear.    The heart is enlarged. Mediastinum and samm cannot be assessed. A stent graft is reidentified in the descending thoracic aorta.    The pulmonary vasculature is normal. There is no visualized pneumothorax.        IMPRESSION:    Limited as above.    Left pleural effusion layering posteriorly. Cannot exclude underlying infiltrate or atelectasis.    Cardiomegaly. Descending thoracic aortic stent graft.              ADVANCE DIRECTIVES:  molst dnr/dni , no feeding tubes   Advanced Care Planning discussion total time spent:

## 2020-11-05 NOTE — PROGRESS NOTE ADULT - ASSESSMENT
A/P 80y male with a PMH of HTN, COPD, CAD, hypothyroidism, ascending aneurysm, BPH, s/p stent graft to repair AAA in late August /early September at [facility]   His Chest CT scan in September showed B/L effusions, RT>Lt, RUL atelectasis, endobronchial debris, and aortic graft stent with endoleak. S/p thoracentesis with fluid transudative.     Per ID:  pt was treated with Zosyn He may have a graft infection and while  do not think he would be a surgical candidate this remains in the differential. Endocarditis a concern as well.  Per CCU team:   1. Acute respiratory failure due to suspected volume overload state ; now with Recurrent hypoxic respiratory failure on 11/2/20 and 11/4  2. Bilateral pleural effusion s/p Right pigtail chest tube - transudate x 2 with negative cytology x 2   3. COPD  4. Right upper lobe collapse   5. Coronary artery disease   6. Hypertension   7. Hypothyroidism  8. Bacteremia - enterococcus faecalis with LA mobile mass at risk for endocarditis     Plan  - NIV QHS, N/C q day  - D/w Dr Benoit Carmona  - on 11/2 also believes in conservative managment. ok with Anticoagulation with lovenox/noac ok  - On abx and a/c for  LA mobile mass as per ID  - A/c with lovenox for elevated didmer and mobile LA mass - can be vegetation or clot   - monitor CXR grossly uncahged  - patient appears to be hypovolumic and continues to have b/l pl effusions  - cont. bronchodilators   - Supplement electrolytes  - Oxygen support to maintain saturation > 90%  - Continue thyroid medications and BPH medications  - Prognosis is poor, given recurrent hospitalizations, made DNR/I on 11/2 MOLST filled out   - PT eval, OOB  - Remains with poor oral intake  - will d/c symbicort as patient has feeling of odynophagia    will give MDI Albuterol instead.       Palliative  : as f/u , case reviewed w/ ccu team this Am, and pt seen bedside, he is weakened and frail appearing . He is lethargic, w/ periods of  alertness , able to converse and make needs known, has periods of confusion .   He needs full assist   to eat, and his po intake is poor . He denies pain , but has episodes of sob requiring NIV bi-pap.  GOC established this admission for Dnr/Dni ( bi-pap ok) , no feeing tubes.   Will cont to follow hospital course w/ med team . Called and left  for daughter Kaur today A/P 80y male with a PMH of HTN, COPD, CAD, hypothyroidism, ascending aneurysm, BPH, s/p stent graft to repair AAA in late August /early September at [facility]   His Chest CT scan in September showed B/L effusions, RT>Lt, RUL atelectasis, endobronchial debris, and aortic graft stent with endoleak. S/p thoracentesis with fluid transudative.     Per ID:  pt was treated with Zosyn He may have a graft infection and while  do not think he would be a surgical candidate this remains in the differential. Endocarditis a concern as well.    Per CCU team:   1. Acute respiratory failure due to suspected volume overload state ; now with Recurrent hypoxic respiratory failure on 11/2/20 and 11/4  2. Bilateral pleural effusion s/p Right pigtail chest tube - transudate x 2 with negative cytology x 2   3. COPD  4. Right upper lobe collapse   5. Coronary artery disease   6. Hypertension   7. Hypothyroidism  8. Bacteremia - enterococcus faecalis with LA mobile mass at risk for endocarditis     Plan per CCU   - NIV QHS, N/C q day  - D/w Dr Benoit Carmona  - on 11/2 also believes in conservative managment. ok with Anticoagulation with lovenox/noac ok  - On abx and a/c for  LA mobile mass as per ID  - A/c with lovenox for elevated didmer and mobile LA mass - can be vegetation or clot   - monitor CXR grossly uncahged  - patient appears to be hypovolumic and continues to have b/l pl effusions  - cont. bronchodilators   - Supplement electrolytes  - Oxygen support to maintain saturation > 90%  - Continue thyroid medications and BPH medications  - Prognosis is poor, given recurrent hospitalizations, made DNR/I on 11/2 MOLST filled out   - PT eval, OOB  - Remains with poor oral intake  - will d/c symbicort as patient has feeling of odynophagia    will give MDI Albuterol instead.       Palliative  : as f/u , case reviewed w/ ccu team this Am, and pt seen bedside, he is weakened and frail appearing . He is lethargic, w/ periods of  alertness , able to converse and make needs known, has periods of confusion .   He needs full assist   to eat, and his po intake is poor . He denies pain , but has episodes of sob requiring NIV bi-pap. ID note appreciated.  [facility] established this admission for Dnr/Dni ( bi-pap ok) , no feeding tubes.   Will cont to follow hospital course w/ med team . Called daughter Kaur today  and left Vm asking for return call.

## 2020-11-05 NOTE — PROGRESS NOTE ADULT - SUBJECTIVE AND OBJECTIVE BOX
CC: f/u for enterococcal bacteremia    Patient reports: he is ill appearing, lethargic, intermittent episodes of shortness of breath.    REVIEW OF SYSTEMS:  All other review of systems negative (Comprehensive ROS)    Antimicrobials Day #  :day 9 synergistic thereapy-? CTX may have fallen off  ampicillin  IVPB 2 Gram(s) IV Intermittent every 6 hours    Other Medications Reviewed  MEDICATIONS  (STANDING):  ALBUTerol    90 MICROgram(s) HFA Inhaler 2 Puff(s) Inhalation every 6 hours  aMIOdarone    Tablet 200 milliGRAM(s) Oral daily  amLODIPine   Tablet 10 milliGRAM(s) Oral daily  ampicillin  IVPB 2 Gram(s) IV Intermittent every 6 hours  Biotene Dry Mouth Oral Rinse 5 milliLiter(s) Swish and Spit every 12 hours  bisacodyl Suppository 10 milliGRAM(s) Rectal daily  enoxaparin Injectable 60 milliGRAM(s) SubCutaneous every 12 hours  furosemide   Injectable 40 milliGRAM(s) IV Push daily  influenza   Vaccine 0.5 milliLiter(s) IntraMuscular once  levothyroxine 100 MICROGram(s) Oral daily  lidocaine   Patch 1 Patch Transdermal every 24 hours  pantoprazole    Tablet 40 milliGRAM(s) Oral before breakfast  senna 2 Tablet(s) Oral at bedtime  tamsulosin 0.4 milliGRAM(s) Oral two times a day    T(F): 97.5 (11-05-20 @ 07:00), Max: 97.8 (11-05-20 @ 04:00)  HR: 70 (11-05-20 @ 10:00)  BP: 115/74 (11-05-20 @ 10:00)  RR: 16 (11-05-20 @ 10:00)  SpO2: 100% (11-05-20 @ 10:00)  Wt(kg): --    PHYSICAL EXAM:  General: lethargic, chronically ill appearing  Eyes:  anicteric, no conjunctival injection, no discharge  Oropharynx: no lesions or injection , dry mucosa	  Neck: supple, without adenopathy  Lungs: clear to auscultation, decreased at bases  Heart: regular rate and rhythm; no murmur, rubs or gallops  Abdomen: soft, nondistended, nontender, without mass or organomegaly  Skin: scattered abrasions  Extremities: no clubbing, cyanosis, or edema  Neurologic: alert, oriented, moves all extremities    LAB RESULTS:                        8.3    8.76  )-----------( 186      ( 05 Nov 2020 05:00 )             27.0     11-05    138  |  102  |  19  ----------------------------<  71  3.6   |  29  |  1.34<H>    Ca    8.5      05 Nov 2020 05:00  Phos  3.3     11-05  Mg     2.1     11-05    TPro  5.7<L>  /  Alb  2.0<L>  /  TBili  0.2  /  DBili  x   /  AST  11  /  ALT  10  /  AlkPhos  100  11-04    LIVER FUNCTIONS - ( 04 Nov 2020 05:00 )  Alb: 2.0 g/dL / Pro: 5.7 g/dL / ALK PHOS: 100 U/L / ALT: 10 U/L / AST: 11 U/L / GGT: x             MICROBIOLOGY:  RECENT CULTURES:  11-02 @ 15:40 .Blood Blood     No growth to date.      11-02 @ 15:00 .Blood Blood     Growth in anaerobic bottle: Gram positive cocci in pairs    Growth in anaerobic bottle: Gram positive cocci in pairs    10-31 @ 16:30 .Blood Blood-Peripheral     No growth to date.          RADIOLOGY REVIEWED:    < from: Xray Chest 1 View- PORTABLE-Routine (Xray Chest 1 View- PORTABLE-Routine in AM.) (11.05.20 @ 06:50) >    INTERPRETATION:  CLINICAL INDICATION: 80 years  Male with eval chf.    COMPARISON: 11/4/2020    The right hemithorax is partially omitted as is the left lung base. The patient is markedly rotated to the right. The lung apices are also partially omitted.    AP view of the chest demonstrates a left pleural effusion layering posteriorly. Cannot exclude underlying infiltrate or atelectasis. The visualized right lung is clear.    The heart is enlarged. Mediastinum and samm cannot be assessed. A stent graft is reidentified in the descending thoracic aorta.    The pulmonary vasculature is normal. There is no visualized pneumothorax.

## 2020-11-05 NOTE — PROGRESS NOTE ADULT - SUBJECTIVE AND OBJECTIVE BOX
Follow-up Critical Care Progress Note  Chief Complaint : Hypoxemia      yesterday he had another episode of hypoxia requiring NIV  with NIV sat improved   today pt confused and lethargic.       Allergies :No Known Allergies      PAST MEDICAL & SURGICAL HISTORY:  Anemia    Cardiac arrhythmia    Anxiety    COPD (chronic obstructive pulmonary disease)    Aortic aneurysm    Heart disease  sp cardiac cath with stent placement    BPH (benign prostatic hyperplasia)    Hypertension    S/P coronary artery stent placement  1 stent- blocked artery        Medications:  MEDICATIONS  (STANDING):  ALBUTerol    90 MICROgram(s) HFA Inhaler 2 Puff(s) Inhalation every 6 hours  aMIOdarone    Tablet 200 milliGRAM(s) Oral daily  amLODIPine   Tablet 10 milliGRAM(s) Oral daily  ampicillin  IVPB 2 Gram(s) IV Intermittent every 6 hours  Biotene Dry Mouth Oral Rinse 5 milliLiter(s) Swish and Spit every 12 hours  bisacodyl Suppository 10 milliGRAM(s) Rectal daily  enoxaparin Injectable 60 milliGRAM(s) SubCutaneous every 12 hours  furosemide   Injectable 40 milliGRAM(s) IV Push daily  influenza   Vaccine 0.5 milliLiter(s) IntraMuscular once  levothyroxine 100 MICROGram(s) Oral daily  lidocaine   Patch 1 Patch Transdermal every 24 hours  pantoprazole    Tablet 40 milliGRAM(s) Oral before breakfast  senna 2 Tablet(s) Oral at bedtime  tamsulosin 0.4 milliGRAM(s) Oral two times a day    MEDICATIONS  (PRN):  acetaminophen   Tablet .. 650 milliGRAM(s) Oral every 6 hours PRN Temp greater or equal to 38C (100.4F), Mild Pain (1 - 3)  albuterol/ipratropium for Nebulization 3 milliLiter(s) Nebulizer every 6 hours PRN Shortness of Breath and/or Wheezing    COVID  10-24-20 @ 21:00  COVID -   NotDetec  09-18-20 @ 16:30  COVID -   NotDete      COVID Biomarkers    11-04-20 @ 05:00 ESR --  ---  CRP --  ---  DDimer  3496<H>   ---   LDH --   ---   Ferritin --    10-28-20 @ 06:00 ESR --  ---  CRP --  ---  DDimer  --   ---      ---   Ferritin --    09-22-20 @ 06:08 ESR --  ---  CRP --  ---  DDimer  --   ---      ---   Ferritin --      Covid 19 IgG ab Index/Interpretation 10-25-20 @ 05:00 0.08 / Negative  Covid 19 IgG ab Index/Interpretation 09-18-20 @ 20:45 <0.10 / Negative      Trend BNP  11-04-20 @ 05:00   -  1113<H>  10-24-20 @ 20:28   -  3345<H>    Procalcitonin Trend  11-04-20 @ 05:00   -   0.40<H>    WBC Trend  11-05-20 @ 05:00   -  8.76  11-04-20 @ 05:00   -  8.22  11-03-20 @ 06:00   -  7.67    H/H Trend  11-05-20 @ 05:00   -  8.3<L> / 27.0<L>  11-04-20 @ 05:00   -  8.3<L> / 27.0<L>  11-03-20 @ 06:00   -  7.8<L> / 25.2<L>    Platelet Trend  11-05-20 @ 05:00   -  186  11-04-20 @ 05:00   -  199  11-03-20 @ 06:00   -  176    Trend Sodium  11-05-20 @ 05:00   -  138  11-04-20 @ 05:00   -  142  11-03-20 @ 16:00   -  140  11-03-20 @ 06:00   -  141    Trend Potassium  11-05-20 @ 05:00   -  3.6  11-04-20 @ 05:00   -  3.8  11-03-20 @ 16:00   -  3.6  11-03-20 @ 06:00   -  2.8<LL>    Trend Bun/Cr  11-05-20 @ 05:00  BUN/CR -  19 / 1.34<H>  11-04-20 @ 05:00  BUN/CR -  18 / 1.39<H>  11-03-20 @ 16:00  BUN/CR -  18 / 1.46<H>  11-03-20 @ 06:00  BUN/CR -  18 / 1.35<H>    LABS:                        8.3    8.76  )-----------( 186      ( 05 Nov 2020 05:00 )             27.0     11-05    138  |  102  |  19  ----------------------------<  71  3.6   |  29  |  1.34<H>    Ca    8.5      05 Nov 2020 05:00  Phos  3.3     11-05  Mg     2.1     11-05    TPro  5.7<L>  /  Alb  2.0<L>  /  TBili  0.2  /  DBili  x   /  AST  11  /  ALT  10  /  AlkPhos  100  11-04        PT/INR - ( 04 Nov 2020 05:00 )   PT: 13.9 sec;   INR: 1.16 ratio         PTT - ( 04 Nov 2020 05:00 )  PTT:48.7 sec    Procalcitonin, Serum: 0.40 ng/mL (11-04-20 @ 05:00)    Serum Pro-Brain Natriuretic Peptide: 1113 pg/mL (11-04-20 @ 05:00)        CULTURES: (if applicable)    Culture - Blood (collected 11-02-20 @ 15:40)  Source: .Blood Blood  Preliminary Report (11-03-20 @ 22:01):    No growth to date.    Culture - Blood (collected 11-02-20 @ 15:00)  Source: .Blood Blood  Gram Stain (11-04-20 @ 22:02):    Growth in anaerobic bottle: Gram positive cocci in pairs  Preliminary Report (11-04-20 @ 22:02):    Growth in anaerobic bottle: Gram positive cocci in pairs    Culture - Blood (collected 10-31-20 @ 16:30)  Source: .Blood Blood-Venous  Preliminary Report (11-01-20 @ 21:02):    No growth to date.    Culture - Blood (collected 10-31-20 @ 16:30)  Source: .Blood Blood-Peripheral  Preliminary Report (11-01-20 @ 21:02):    No growth to date.    Culture - Blood (collected 10-29-20 @ 06:20)  Source: .Blood Blood-Venous  Gram Stain (10-31-20 @ 12:47):    Growth in aerobic bottle: Gram positive cocci in pairs    Growth in anaerobic bottle: Gram positive cocci in pairs  Final Report (11-02-20 @ 13:00):    Growth in aerobic and anaerobic bottles: Enterococcus faecalis    "Due to technical problems, Proteus sp. will Not be reported as part of    the BCID panel until further notice"    ***Blood Panel PCR results on this specimen are available    approximately 3 hours after the Gram stain result.***    Gram stain, PCR, and/or culture results may not always    correspond due to difference in methodologies.    ************************************************************    This PCR assay was performed using [employer].    The following targets are tested for: Enterococcus,    vancomycin resistant enterococci, Listeria monocytogenes,    coagulase negative staphylococci, S. aureus,    methicillin resistant S. aureus, Streptococcus agalactiae    (Group B), S. pneumoniae, S. pyogenes (Group A),    Acinetobacter baumannii, Enterobacter cloacae, E. coli,    Klebsiella oxytoca, K. pneumoniae, Proteus sp.,    Serratia marcescens, Haemophilus influenzae,    Neisseria meningitidis, Pseudomonas aeruginosa, Candida    albicans, C. glabrata, C krusei, C parapsilosis,    C. tropicalis and the KPC resistance gene.  Organism: Blood Culture PCR  Enterococcus faecalis (11-02-20 @ 13:00)  Organism: Enterococcus faecalis (11-02-20 @ 13:00)      -  Ampicillin: S <=2 Predicts results to ampicillin/sulbactam, amoxacillin-clavulanate and  piperacillin-tazobactam.      -  Gentamicin synergy: R      -  Vancomycin: S 2      Method Type: COLTEN  Organism: Blood Culture PCR (11-02-20 @ 13:00)      -  Enterococcus species: Detec      Method Type: PCR    Culture - Blood (collected 10-29-20 @ 06:20)  Source: .Blood Blood-Peripheral  Gram Stain (10-31-20 @ 04:51):    Growth in anaerobic bottle: Gram Positive Cocci in Pairs and Chains    Growth in aerobic bottle: Gram Positive Cocci in Pairs and Chains  Final Report (11-02-20 @ 08:57):    Growth in aerobic and anaerobic bottles: Enterococcus faecalis  Organism: Enterococcus faecalis (11-02-20 @ 08:57)  Organism: Enterococcus faecalis (11-02-20 @ 08:57)      -  Ampicillin: S <=2 Predicts results to ampicillin/sulbactam, amoxacillin-clavulanate and  piperacillin-tazobactam.      -  Gentamicin synergy: R      -  Vancomycin: S 2      Method Type: COLTEN    Culture - Fungal, Body Fluid (collected 10-27-20 @ 12:15)  Source: Pleural Fl Pleural Fluid  Preliminary Report (11-04-20 @ 15:02):    No growth    Culture - Body Fluid with Gram Stain (collected 10-27-20 @ 12:15)  Source: Pleural Fl Pleural Fluid  Gram Stain (10-27-20 @ 23:20):    polymorphonuclear leukocytes seen    No organisms seen    by cytocentrifuge  Final Report (11-01-20 @ 15:04):    No growth    Culture - Blood (collected 10-27-20 @ 05:20)  Source: .Blood Blood-Venous  Gram Stain (10-28-20 @ 02:51):    Growth in aerobic bottle: Gram Positive Cocci in Pairs and Chains    Growth in anaerobic bottle: Gram Positive Cocci in Pairs and Chains  Final Report (10-28-20 @ 21:15):    Growth in aerobic and anaerobic bottles: Enterococcus faecalis    See previous culture [id number]    Culture - Blood (collected 10-27-20 @ 05:20)  Source: .Blood Blood-Peripheral  Gram Stain (10-28-20 @ 03:37):    Growth in anaerobic bottle: Gram Positive Cocci in Pairs and Chains    Growth in aerobic bottle: Gram Positive Cocci in Pairs and Chains  Final Report (10-28-20 @ 22:37):    Growth in aerobic and anaerobic bottles: Enterococcus faecalis    See previous culture [id number]    Culture - Urine (collected 10-24-20 @ 22:15)  Source: .Urine Clean Catch (Midstream)  Final Report (10-26-20 @ 07:55):    <10,000 CFU/mL Normal Urogenital Tari    Culture - Blood (collected 10-24-20 @ 20:28)  Source: .Blood Blood-Peripheral  Gram Stain (10-25-20 @ 16:04):    Growth in aerobic and anaerobic bottles: Gram Positive Cocci in Pairs and    Chains  Final Report (10-27-20 @ 14:11):    Growth in aerobic and anaerobic bottles: Enterococcus faecalis    See previous culture [id number]    Culture - Blood (collected 10-24-20 @ 20:28)  Source: .Blood Blood-Peripheral  Gram Stain (10-25-20 @ 16:01):    Growth in anaerobic bottle: Gram Positive Cocci in Pairs and Chains    Growth in aerobic bottle: Gram Positive Cocci in Pairs and Chains  Final Report (10-27-20 @ 14:11):    Growth in aerobic and anaerobic bottles: Enterococcus faecalis    "Due to technical problems, Proteus sp. will Not be reported as part of    the BCID panel until further notice" ***Blood Panel PCR results on this    specimen are available    approximately 3 hours after the Gram stain result.***    Gram stain, PCR, and/or culture results may not always    correspond due to difference in methodologies.    ************************************************************    This PCR assay was performed using [employer].    The following targets are tested for: Enterococcus,    vancomycin resistant enterococci, Listeria monocytogenes,    coagulase negative staphylococci, S. aureus,    methicillin resistant S. aureus, Streptococcus agalactiae    (Group B), S. pneumoniae, S. pyogenes (Group A),    Acinetobacter baumannii, Enterobacter cloacae, E. coli,    Klebsiella oxytoca, K. pneumoniae, Proteus sp.,    Serratia marcescens, Haemophilus influenzae,    Neisseria meningitidis, Pseudomonas aeruginosa, Candida    albicans, C. glabrata, C krusei, C parapsilosis,    C. tropicalis and the KPC resistance gene.  Organism: Blood Culture PCR  Enterococcus faecalis (10-27-20 @ 14:11)  Organism: Enterococcus faecalis (10-27-20 @ 14:11)      -  Ampicillin: S <=2 Predicts results to ampicillin/sulbactam, amoxacillin-clavulanate and  piperacillin-tazobactam.      -  Gentamicin synergy: R      -  Vancomycin: S 2      Method Type: COLTEN  Organism: Blood Culture PCR (10-27-20 @ 14:11)      -  Enterococcus species: Detec      Method Type: PCR            CAPILLARY BLOOD GLUCOSE          RADIOLOGY  CXR:    < from: Xray Chest 1 View- PORTABLE-Routine (Xray Chest 1 View- PORTABLE-Routine in AM.) (11.04.20 @ 10:19) >  INTERPRETATION:  XR CHEST    Single AP view    HISTORY:  pl effusion    Comparison: Chest x-ray 2 days prior    The cardiac silhouette is enlarged. Aortic stent is noted. Bibasilar opacities. Bilateral pleural effusions are more or less the same.    IMPRESSION: Bilateral pleural effusions are more or less the same      < end of copied text >      VITALS:  T(C): 36.4 (11-05-20 @ 07:00), Max: 36.6 (11-05-20 @ 04:00)  T(F): 97.5 (11-05-20 @ 07:00), Max: 97.8 (11-05-20 @ 04:00)  HR: 73 (11-05-20 @ 08:00) (69 - 81)  BP: 93/61 (11-05-20 @ 08:00) (93/53 - 119/61)  BP(mean): 71 (11-05-20 @ 08:00) (64 - 88)  ABP: --  ABP(mean): --  RR: 15 (11-05-20 @ 08:00) (14 - 24)  SpO2: 100% (11-05-20 @ 08:00) (89% - 100%)  CVP(mm Hg): --  CVP(cm H2O): --    Ins and Outs     11-04-20 @ 07:01  -  11-05-20 @ 07:00  --------------------------------------------------------  IN: 250 mL / OUT: 1040 mL / NET: -790 mL    11-05-20 @ 07:01  -  11-05-20 @ 08:47  --------------------------------------------------------  IN: 0 mL / OUT: 200 mL / NET: -200 mL                I&O's Detail    04 Nov 2020 07:01  -  05 Nov 2020 07:00  --------------------------------------------------------  IN:    IV PiggyBack: 100 mL    Oral Fluid: 150 mL  Total IN: 250 mL    OUT:    Indwelling Catheter - Urethral (mL): 990 mL    Voided (mL): 50 mL  Total OUT: 1040 mL    Total NET: -790 mL      05 Nov 2020 07:01  -  05 Nov 2020 08:47  --------------------------------------------------------  IN:  Total IN: 0 mL    OUT:    Indwelling Catheter - Urethral (mL): 200 mL  Total OUT: 200 mL    Total NET: -200 mL

## 2020-11-05 NOTE — PROGRESS NOTE ADULT - ASSESSMENT
80M admitted to ICU initially with hypoxemic respiratory failure secondary to combination of large right sided pleural effusion as well as significant mucus plug, with likely underlying PNA. Patient required HFNC with high FIO2, he was transferred out of ICU once stabalized. However recently he was noted to ahve an epsidoe of severe hypoxia, required replacement on NIPPV, and was transferred back to ICU.      Events last 24 hours:   -patient now transferred to  service  -remains DNR/DNI    PLAN:    #Acute hypoxic respiratory failure w/ large right plural effusion and mucous plug/atelectasis  -Patient currently on BiPAP  -will titrate IPAP and EPAP to maintain pH >7.30 and SaO2 >92%  -alarms reviewed  -NPO while on BiPAP   -continue duonebs and budesonide for COPD  -S/P pigtail CT for effusion which is now removed.  -diurese with lasix daily while monitoring lytes.   -continue with aggressive chest PT, pulmonary toilet, bed percussion, and IS use    #Enterococcal Bactermia:  -noted on blood cultures from 10/27/2020  -currently on  amipicillin, will need to compelte course, ID following    #Anemia   -patient Hemoglobin is 8.3 again today   -no signs of bleeding, will trend Hb daily with AM labs, transfuse if Hb <7    #hypothyroid   - Patient received synthroid 100mcg, c/w current dosage     #BPH  - Patient on flomax 0.4mg twice daily. Will continue to prevent retention     #Dvt prophylaxis   -on full dose loveonx for elevated D-dimer and left atrial mass seen on ECHO.    #GI prophylaxis   - Protonix 40mg IVP once daily

## 2020-11-05 NOTE — PROGRESS NOTE ADULT - ASSESSMENT
80y male with a PMH of HTN, COPD, CAD, hypothyroidism, ascending aneurysm, BPH, s/p stent graft to repair AAA in late August /early September at [facility] [place]   His Chest CT scan in September showed B/L effusions, RT>Lt, RUL atelectasis, endobronchial debris, and aortic graft stent with endoleak. S/p thoracentesis with fluid transudative. Was treated with Zosyn    Admitted on 10/24 with c/o SOB & started CTX and azithro at [facility]  He has multiple reasons for shortness of breath-effusions, atelectasis, and infiltrates.  Vancomycin added after blood cx recovered Enterococcus which is not a respiratory pathogen.  The organism is not growing in the urine, therefore raises concerns of endocarditis as well as graft infection. With his recent ? GI bleed, I am concerned about status of aneurysm and graft.  He has had a sustained bacteremia-2 sets of 10/24 blood cultures with enterococcus and 2 sets of blood cultures sent on 10/27 with enterococcus.He is now growing a GPC in 11/2 culture  Sustained bacteremia suggestive of endovascular focus  TTE without evidence of endocarditis.He does have a clot in LA  CTA of abd & pelvis revealed stable appearance of descending thoracoabdominal aortic stent graft. No evidence of perigraft collection or gas to suggest graft infection. 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   pleural fluid is not infected, appears to be in heart failure  S/P pigtail on rt side, may have left side drained    Suggest:  1. Continue Amp/CTX for extended enterococcal therapy  2.await ID of 11/2 blood culture, if positive it will imply ongoing enterococcal bacteremia  3He may have a graft infection and while I do not think he would be a surgical candidate this remains in the differential.Endocarditis a concern as well.  4.Goals of care will be important.He appears to have a life limiting infecion

## 2020-11-05 NOTE — PROGRESS NOTE ADULT - SUBJECTIVE AND OBJECTIVE BOX
Patient is a 80y old  Male who presents with a chief complaint of SOB/Hypoxia/Right lung white out/mucous plug (05 Nov 2020 20:43)      BRIEF HOSPITAL COURSE:       80M admitted to ICU initially with hypoxemic respiratory failure secondary to combination of large right sided pleural effusion as well as significant mucus plug, with likely underlying PNA. Patient required HFNC with high FIO2, he was transferred out of ICU once stabalized. However recently he was noted to ahve an epsidoe of severe hypoxia, required replacement on NIPPV, and was transferred back to ICU.      Events last 24 hours:   -patient now transferred to  service  -remains DNR/DNI    PAST MEDICAL & SURGICAL HISTORY:  Anemia    Cardiac arrhythmia    Anxiety    COPD (chronic obstructive pulmonary disease)    Aortic aneurysm    Heart disease  sp cardiac cath with stent placement    BPH (benign prostatic hyperplasia)    Hypertension    S/P coronary artery stent placement  1 stent- blocked artery        Review of Systems:  -unable to obtain at this time. BiPAP/confusion      Medications:  ampicillin  IVPB 2 Gram(s) IV Intermittent every 6 hours    aMIOdarone    Tablet 200 milliGRAM(s) Oral daily  amLODIPine   Tablet 10 milliGRAM(s) Oral daily  furosemide   Injectable 40 milliGRAM(s) IV Push daily  tamsulosin 0.4 milliGRAM(s) Oral two times a day    ALBUTerol    90 MICROgram(s) HFA Inhaler 2 Puff(s) Inhalation every 6 hours  albuterol/ipratropium for Nebulization 3 milliLiter(s) Nebulizer every 6 hours PRN    acetaminophen   Tablet .. 650 milliGRAM(s) Oral every 6 hours PRN        bisacodyl Suppository 10 milliGRAM(s) Rectal daily  pantoprazole    Tablet 40 milliGRAM(s) Oral before breakfast  senna 2 Tablet(s) Oral at bedtime      levothyroxine 100 MICROGram(s) Oral daily      influenza   Vaccine 0.5 milliLiter(s) IntraMuscular once    Biotene Dry Mouth Oral Rinse 5 milliLiter(s) Swish and Spit every 12 hours  lidocaine   Patch 1 Patch Transdermal every 24 hours            ICU Vital Signs Last 24 Hrs  T(C): 36.3 (05 Nov 2020 17:40), Max: 36.6 (05 Nov 2020 04:00)  T(F): 97.3 (05 Nov 2020 17:40), Max: 97.8 (05 Nov 2020 04:00)  HR: 74 (05 Nov 2020 17:40) (69 - 81)  BP: 107/68 (05 Nov 2020 17:40) (93/61 - 126/74)  BP(mean): 87 (05 Nov 2020 11:00) (71 - 89)  RR: 15 (05 Nov 2020 17:40) (14 - 24)  SpO2: 96% (05 Nov 2020 17:40) (96% - 100%)          I&O's Detail    04 Nov 2020 07:01  -  05 Nov 2020 07:00  --------------------------------------------------------  IN:    IV PiggyBack: 100 mL    Oral Fluid: 150 mL  Total IN: 250 mL    OUT:    Indwelling Catheter - Urethral (mL): 990 mL    Voided (mL): 50 mL  Total OUT: 1040 mL    Total NET: -790 mL      05 Nov 2020 07:01  -  05 Nov 2020 22:03  --------------------------------------------------------  IN:    IV PiggyBack: 100 mL  Total IN: 100 mL    OUT:    Indwelling Catheter - Urethral (mL): 500 mL  Total OUT: 500 mL    Total NET: -400 mL            LABS:                        8.3    8.76  )-----------( 186      ( 05 Nov 2020 05:00 )             27.0     11-05    138  |  102  |  19  ----------------------------<  71  3.6   |  29  |  1.34<H>    Ca    8.5      05 Nov 2020 05:00  Phos  3.3     11-05  Mg     2.1     11-05    TPro  5.7<L>  /  Alb  2.0<L>  /  TBili  0.2  /  DBili  x   /  AST  11  /  ALT  10  /  AlkPhos  100  11-04          CAPILLARY BLOOD GLUCOSE        PT/INR - ( 04 Nov 2020 05:00 )   PT: 13.9 sec;   INR: 1.16 ratio         PTT - ( 04 Nov 2020 05:00 )  PTT:48.7 sec    CULTURES:  Culture Results:   No growth to date. (11-02-20 @ 15:40)  Culture Results:   Growth in anaerobic bottle: Enterococcus faecalis (11-02-20 @ 15:00)  Culture Results:   No Growth Final (10-31-20 @ 16:30)  Culture Results:   No Growth Final (10-31-20 @ 16:30)      Physical Examination:    General: On BiPAP, intermittently confused.     HEENT: Pupils equal, reactive to light.  Symmetric.    PULM: rhonci noted bilaterally, no significant sputum production    CVS: Regular rate and rhythm, no murmurs, rubs, or gallops    ABD: Soft, nondistended, nontender, normoactive bowel sounds, no masses    EXT: No edema, nontender    SKIN: Warm and well perfused, no rashes noted.

## 2020-11-05 NOTE — PROVIDER CONTACT NOTE (MEDICATION) - SITUATION
Pt was given medications today in ice cream, pt spit out medications and stated he doesn't want them. Md aware.

## 2020-11-06 NOTE — PROGRESS NOTE ADULT - SUBJECTIVE AND OBJECTIVE BOX
Follow-up Critical Care Progress Note  Chief Complaint : Hypoxemia      patient seen and examined  confused today  refusing to eat  now started on d5 drip  noted worsening hypoxia placed on n/c     Extensive discussion with dtr had, patient has started the dieing processes  patient now with worsening renal function  thoracentesis at this time would not change outcome  patient with continued bacteremia  GOC re discussed wants Comfort measures   ok with Bipap if patient accepts,   ok with morphine if needed  she will start making arrangements for his passing  would not want autopsy         Allergies :No Known Allergies      PAST MEDICAL & SURGICAL HISTORY:  Anemia    Cardiac arrhythmia    Anxiety    COPD (chronic obstructive pulmonary disease)    Aortic aneurysm    Heart disease  sp cardiac cath with stent placement    BPH (benign prostatic hyperplasia)    Hypertension    S/P coronary artery stent placement  1 stent- blocked artery        Medications:  MEDICATIONS  (STANDING):  ALBUTerol    90 MICROgram(s) HFA Inhaler 2 Puff(s) Inhalation every 6 hours  aMIOdarone    Tablet 200 milliGRAM(s) Oral daily  amLODIPine   Tablet 10 milliGRAM(s) Oral daily  ampicillin  IVPB 2 Gram(s) IV Intermittent every 6 hours  Biotene Dry Mouth Oral Rinse 5 milliLiter(s) Swish and Spit every 12 hours  bisacodyl Suppository 10 milliGRAM(s) Rectal daily  cefTRIAXone   IVPB 2000 milliGRAM(s) IV Intermittent every 12 hours  dextrose 5% 1000 milliLiter(s) (50 mL/Hr) IV Continuous <Continuous>  furosemide   Injectable 40 milliGRAM(s) IV Push daily  influenza   Vaccine 0.5 milliLiter(s) IntraMuscular once  levothyroxine 100 MICROGram(s) Oral daily  lidocaine   Patch 1 Patch Transdermal every 24 hours  pantoprazole    Tablet 40 milliGRAM(s) Oral before breakfast  senna 2 Tablet(s) Oral at bedtime  tamsulosin 0.4 milliGRAM(s) Oral two times a day    MEDICATIONS  (PRN):  acetaminophen   Tablet .. 650 milliGRAM(s) Oral every 6 hours PRN Temp greater or equal to 38C (100.4F), Mild Pain (1 - 3)  albuterol/ipratropium for Nebulization 3 milliLiter(s) Nebulizer every 6 hours PRN Shortness of Breath and/or Wheezing    COVID  10-24-20 @ 21:00  COVID -   NotDetec  09-18-20 @ 16:30  COVID -   NotDetec      COVID Biomarkers    11-04-20 @ 05:00 ESR --  ---  CRP --  ---  DDimer  3496<H>   ---   LDH --   ---   Ferritin --    10-28-20 @ 06:00 ESR --  ---  CRP --  ---  DDimer  --   ---      ---   Ferritin --    09-22-20 @ 06:08 ESR --  ---  CRP --  ---  DDimer  --   ---      ---   Ferritin --          Covid 19 IgG ab Index/Interpretation 10-25-20 @ 05:00 0.08 / Negative  Covid 19 IgG ab Index/Interpretation 09-18-20 @ 20:45 <0.10 / Negative    Trend Cardiac Enzymes    Trend BNP  11-04-20 @ 05:00   -  1113<H>  10-24-20 @ 20:28   -  3345<H>    Procalcitonin Trend  11-04-20 @ 05:00   -   0.40<H>    WBC Trend  11-06-20 @ 05:30   -  9.11  11-05-20 @ 05:00   -  8.76  11-04-20 @ 05:00   -  8.22    H/H Trend  11-06-20 @ 05:30   -  8.0<L> / 26.7<L>  11-05-20 @ 05:00   -  8.3<L> / 27.0<L>  11-04-20 @ 05:00   -  8.3<L> / 27.0<L>    Platelet Trend  11-06-20 @ 05:30   -  176  11-05-20 @ 05:00   -  186  11-04-20 @ 05:00   -  199    Trend Sodium  11-06-20 @ 05:30   -  149<H>  11-05-20 @ 05:00   -  138  11-04-20 @ 05:00   -  142  11-03-20 @ 16:00   -  140    Trend Potassium  11-06-20 @ 05:30   -  3.2<L>  11-05-20 @ 05:00   -  3.6  11-04-20 @ 05:00   -  3.8  11-03-20 @ 16:00   -  3.6    Trend Bun/Cr  11-06-20 @ 05:30  BUN/CR -  24<H> / 1.61<H>  11-05-20 @ 05:00  BUN/CR -  19 / 1.34<H>  11-04-20 @ 05:00  BUN/CR -  18 / 1.39<H>  11-03-20 @ 16:00  BUN/CR -  18 / 1.46<H>    ABG Trend  11-02-20 @ 11:55   - 7.45/42/199<H>/97<H>  11-02-20 @ 08:00   - 7.42/47<H>/62<L>/90<L>  10-25-20 @ 05:01   - 7.41/37/79/94  10-24-20 @ 20:30   - 7.41/39/56<L>/85<L>    Trend AST/ALT/ALK Phos/Bili  11-04-20 @ 05:00   11/10/100/0.2  11-03-20 @ 06:00   13/9<L>/95/0.2  10-29-20 @ 06:15   11/18/94/0.4  10-27-20 @ 05:20   11/15/103/0.3  10-24-20 @ 20:28   28/20/140<H>/0.4  09-18-20 @ 11:34   24/27/101/0.4    Albumin Trend  11-04-20 @ 05:00   -   2.0<L>  11-03-20 @ 06:00   -   2.0<L>  10-29-20 @ 06:15   -   2.9<L>  10-27-20 @ 05:20   -   2.9<L>  10-24-20 @ 20:28   -   1.4<L>  09-18-20 @ 11:34   -   2.0<L>      PTT - PT - INR Trend  11-04-20 @ 05:00   -   48.7<H> - 13.9<H> - 1.16  10-24-20 @ 20:28   -   39.9<H> - 14.0<H> - 1.17<H>  09-18-20 @ 11:34   -   -- - 13.2 - 1.10    Glucose Trend  11-06-20 @ 08:50   -  -- -- 148<H>  11-06-20 @ 08:23   -  -- -- 50<LL>  11-06-20 @ 05:30   -  -- 43<LL> --  11-05-20 @ 05:00   -  -- 71 --  11-04-20 @ 05:00   -  -- 73 --  11-03-20 @ 16:00   -  -- 124<H> --        LABS:                        8.0    9.11  )-----------( 176      ( 06 Nov 2020 05:30 )             26.7     11-06    149<H>  |  109<H>  |  24<H>  ----------------------------<  43<LL>  3.2<L>   |  28  |  1.61<H>    Ca    8.6      06 Nov 2020 05:30  Phos  3.3     11-05  Mg     2.1     11-05      Procalcitonin, Serum: 0.40 ng/mL (11-04-20 @ 05:00)    Serum Pro-Brain Natriuretic Peptide: 1113 pg/mL (11-04-20 @ 05:00)        CULTURES: (if applicable)    Culture - Blood (collected 11-02-20 @ 15:40)  Source: .Blood Blood  Preliminary Report (11-03-20 @ 22:01):    No growth to date.    Culture - Blood (collected 11-02-20 @ 15:00)  Source: .Blood Blood  Gram Stain (11-04-20 @ 22:02):    Growth in anaerobic bottle: Gram positive cocci in pairs  Preliminary Report (11-05-20 @ 17:50):    Growth in anaerobic bottle: Enterococcus faecalis    Culture - Blood (collected 10-31-20 @ 16:30)  Source: .Blood Blood-Venous  Final Report (11-05-20 @ 21:00):    No Growth Final    Culture - Blood (collected 10-31-20 @ 16:30)  Source: .Blood Blood-Peripheral  Final Report (11-05-20 @ 21:00):    No Growth Final    Culture - Blood (collected 10-29-20 @ 06:20)  Source: .Blood Blood-Venous  Gram Stain (10-31-20 @ 12:47):    Growth in aerobic bottle: Gram positive cocci in pairs    Growth in anaerobic bottle: Gram positive cocci in pairs  Final Report (11-02-20 @ 13:00):    Growth in aerobic and anaerobic bottles: Enterococcus faecalis    "Due to technical problems, Proteus sp. will Not be reported as part of    the BCID panel until further notice"    ***Blood Panel PCR results on this specimen are available    approximately 3 hours after the Gram stain result.***    Gram stain, PCR, and/or culture results may not always    correspond due to difference in methodologies.    ************************************************************    This PCR assay was performed using [employer].    The following targets are tested for: Enterococcus,    vancomycin resistant enterococci, Listeria monocytogenes,    coagulase negative staphylococci, S. aureus,    methicillin resistant S. aureus, Streptococcus agalactiae    (Group B), S. pneumoniae, S. pyogenes (Group A),    Acinetobacter baumannii, Enterobacter cloacae, E. coli,    Klebsiella oxytoca, K. pneumoniae, Proteus sp.,    Serratia marcescens, Haemophilus influenzae,    Neisseria meningitidis, Pseudomonas aeruginosa, Candida    albicans, C. glabrata, C krusei, C parapsilosis,    C. tropicalis and the KPC resistance gene.  Organism: Blood Culture PCR  Enterococcus faecalis (11-02-20 @ 13:00)  Organism: Enterococcus faecalis (11-02-20 @ 13:00)      -  Ampicillin: S <=2 Predicts results to ampicillin/sulbactam, amoxacillin-clavulanate and  piperacillin-tazobactam.      -  Gentamicin synergy: R      -  Vancomycin: S 2      Method Type: COLTEN  Organism: Blood Culture PCR (11-02-20 @ 13:00)      -  Enterococcus species: Detec      Method Type: PCR    Culture - Blood (collected 10-29-20 @ 06:20)  Source: .Blood Blood-Peripheral  Gram Stain (10-31-20 @ 04:51):    Growth in anaerobic bottle: Gram Positive Cocci in Pairs and Chains    Growth in aerobic bottle: Gram Positive Cocci in Pairs and Chains  Final Report (11-02-20 @ 08:57):    Growth in aerobic and anaerobic bottles: Enterococcus faecalis  Organism: Enterococcus faecalis (11-02-20 @ 08:57)  Organism: Enterococcus faecalis (11-02-20 @ 08:57)      -  Ampicillin: S <=2 Predicts results to ampicillin/sulbactam, amoxacillin-clavulanate and  piperacillin-tazobactam.      -  Gentamicin synergy: R      -  Vancomycin: S 2      Method Type: COLTEN    Culture - Fungal, Body Fluid (collected 10-27-20 @ 12:15)  Source: Pleural Fl Pleural Fluid  Preliminary Report (11-04-20 @ 15:02):    No growth    Culture - Body Fluid with Gram Stain (collected 10-27-20 @ 12:15)  Source: Pleural Fl Pleural Fluid  Gram Stain (10-27-20 @ 23:20):    polymorphonuclear leukocytes seen    No organisms seen    by cytocentrifuge  Final Report (11-01-20 @ 15:04):    No growth    Culture - Blood (collected 10-27-20 @ 05:20)  Source: .Blood Blood-Venous  Gram Stain (10-28-20 @ 02:51):    Growth in aerobic bottle: Gram Positive Cocci in Pairs and Chains    Growth in anaerobic bottle: Gram Positive Cocci in Pairs and Chains  Final Report (10-28-20 @ 21:15):    Growth in aerobic and anaerobic bottles: Enterococcus faecalis    See previous culture [id number]    Culture - Blood (collected 10-27-20 @ 05:20)  Source: .Blood Blood-Peripheral  Gram Stain (10-28-20 @ 03:37):    Growth in anaerobic bottle: Gram Positive Cocci in Pairs and Chains    Growth in aerobic bottle: Gram Positive Cocci in Pairs and Chains  Final Report (10-28-20 @ 22:37):    Growth in aerobic and anaerobic bottles: Enterococcus faecalis    See previous culture [id number]    Culture - Urine (collected 10-24-20 @ 22:15)  Source: .Urine Clean Catch (Midstream)  Final Report (10-26-20 @ 07:55):    <10,000 CFU/mL Normal Urogenital Tari    Culture - Blood (collected 10-24-20 @ 20:28)  Source: .Blood Blood-Peripheral  Gram Stain (10-25-20 @ 16:04):    Growth in aerobic and anaerobic bottles: Gram Positive Cocci in Pairs and    Chains  Final Report (10-27-20 @ 14:11):    Growth in aerobic and anaerobic bottles: Enterococcus faecalis    See previous culture [id number]    Culture - Blood (collected 10-24-20 @ 20:28)  Source: .Blood Blood-Peripheral  Gram Stain (10-25-20 @ 16:01):    Growth in anaerobic bottle: Gram Positive Cocci in Pairs and Chains    Growth in aerobic bottle: Gram Positive Cocci in Pairs and Chains  Final Report (10-27-20 @ 14:11):    Growth in aerobic and anaerobic bottles: Enterococcus faecalis    "Due to technical problems, Proteus sp. will Not be reported as part of    the BCID panel until further notice" ***Blood Panel PCR results on this    specimen are available    approximately 3 hours after the Gram stain result.***    Gram stain, PCR, and/or culture results may not always    correspond due to difference in methodologies.    ************************************************************    This PCR assay was performed using [employer].    The following targets are tested for: Enterococcus,    vancomycin resistant enterococci, Listeria monocytogenes,    coagulase negative staphylococci, S. aureus,    methicillin resistant S. aureus, Streptococcus agalactiae    (Group B), S. pneumoniae, S. pyogenes (Group A),    Acinetobacter baumannii, Enterobacter cloacae, E. coli,    Klebsiella oxytoca, K. pneumoniae, Proteus sp.,    Serratia marcescens, Haemophilus influenzae,    Neisseria meningitidis, Pseudomonas aeruginosa, Candida    albicans, C. glabrata, C krusei, C parapsilosis,    C. tropicalis and the KPC resistance gene.  Organism: Blood Culture PCR  Enterococcus faecalis (10-27-20 @ 14:11)  Organism: Enterococcus faecalis (10-27-20 @ 14:11)      -  Ampicillin: S <=2 Predicts results to ampicillin/sulbactam, amoxacillin-clavulanate and  piperacillin-tazobactam.      -  Gentamicin synergy: R      -  Vancomycin: S 2      Method Type: COLTEN  Organism: Blood Culture PCR (10-27-20 @ 14:11)      -  Enterococcus species: Detec      Method Type: PCR            CAPILLARY BLOOD GLUCOSE      POCT Blood Glucose.: 148 mg/dL (06 Nov 2020 08:50)      RADIOLOGY  CXR:    EXAM: XR CHEST PORTABLE ROUTINE 1V    PROCEDURE DATE: 11/05/2020      INTERPRETATION: CLINICAL INDICATION: 80 years Male with eval chf.    COMPARISON: 11/4/2020    The right hemithorax is partially omitted as is the left lung base. The patient is markedly rotated to the right. The lung apices are also partially omitted.    AP view of the chest demonstrates a left pleural effusion layering posteriorly. Cannot exclude underlying infiltrate or atelectasis. The visualized right lung is clear.    The heart is enlarged. Mediastinum and samm cannot be assessed. A stent graft is reidentified in the descending thoracic aorta.    The pulmonary vasculature is normal. There is no visualized pneumothorax.        IMPRESSION:    Limited as above.    Left pleural effusion layering posteriorly. Cannot exclude underlying infiltrate or atelectasis.    Cardiomegaly. Descending thoracic aortic stent graft.    VITALS:  T(C): 36.4 (11-06-20 @ 06:00), Max: 36.8 (11-06-20 @ 00:00)  T(F): 97.6 (11-06-20 @ 06:00), Max: 98.3 (11-06-20 @ 00:00)  HR: 72 (11-06-20 @ 09:13) (70 - 78)  BP: 110/54 (11-06-20 @ 08:00) (92/54 - 126/74)  BP(mean): 87 (11-05-20 @ 11:00) (87 - 87)  ABP: --  ABP(mean): --  RR: 16 (11-06-20 @ 08:00) (15 - 18)  SpO2: 100% (11-06-20 @ 09:13) (96% - 100%)  CVP(mm Hg): --  CVP(cm H2O): --    Ins and Outs     11-05-20 @ 07:01  -  11-06-20 @ 07:00  --------------------------------------------------------  IN: 200 mL / OUT: 900 mL / NET: -700 mL    11-06-20 @ 07:01  -  11-06-20 @ 10:32  --------------------------------------------------------  IN: 50 mL / OUT: 0 mL / NET: 50 mL                I&O's Detail    05 Nov 2020 07:01  -  06 Nov 2020 07:00  --------------------------------------------------------  IN:    IV PiggyBack: 200 mL  Total IN: 200 mL    OUT:    Indwelling Catheter - Urethral (mL): 900 mL  Total OUT: 900 mL    Total NET: -700 mL      06 Nov 2020 07:01  -  06 Nov 2020 10:32  --------------------------------------------------------  IN:    IV PiggyBack: 50 mL  Total IN: 50 mL    OUT:  Total OUT: 0 mL    Total NET: 50 mL

## 2020-11-06 NOTE — DISCHARGE NOTE FOR THE EXPIRED PATIENT - OTHER SIGNIFICANT FINDINGS
-Neuro: Unresponsive to verbal/tactile or painful stimuli  -Eyes: Pupils fixed and dilated   -Cardiac: asystole on monitor, no heart beat, no carotid/femoral pulses palpated  -Resp: No breath sounds of spontaneous breathing b/l  -Skin: warm/dry

## 2020-11-06 NOTE — PROGRESS NOTE ADULT - SUBJECTIVE AND OBJECTIVE BOX
Progress: pt weaker, declining , not eating    Present Symptoms:   Dyspnea:   Nausea/Vomiting:   Anxiety:    Depressed Mood:   Fatigue:   Loss of appetite:   Pain:                   location:   Review of Systems: Unable to obtain due to poor mentation]    MEDICATIONS  (STANDING):  ALBUTerol    90 MICROgram(s) HFA Inhaler 2 Puff(s) Inhalation every 6 hours  aMIOdarone    Tablet 200 milliGRAM(s) Oral daily  amLODIPine   Tablet 10 milliGRAM(s) Oral daily  ampicillin  IVPB 2 Gram(s) IV Intermittent every 6 hours  apixaban 5 milliGRAM(s) Oral every 12 hours  Biotene Dry Mouth Oral Rinse 5 milliLiter(s) Swish and Spit every 12 hours  bisacodyl Suppository 10 milliGRAM(s) Rectal daily  cefTRIAXone   IVPB 2000 milliGRAM(s) IV Intermittent every 12 hours  dextrose 5% 1000 milliLiter(s) (50 mL/Hr) IV Continuous <Continuous>  furosemide   Injectable 40 milliGRAM(s) IV Push daily  influenza   Vaccine 0.5 milliLiter(s) IntraMuscular once  levothyroxine 100 MICROGram(s) Oral daily  lidocaine   Patch 1 Patch Transdermal every 24 hours  pantoprazole    Tablet 40 milliGRAM(s) Oral before breakfast  senna 2 Tablet(s) Oral at bedtime  tamsulosin 0.4 milliGRAM(s) Oral two times a day    MEDICATIONS  (PRN):  acetaminophen   Tablet .. 650 milliGRAM(s) Oral every 6 hours PRN Temp greater or equal to 38C (100.4F), Mild Pain (1 - 3)  albuterol/ipratropium for Nebulization 3 milliLiter(s) Nebulizer every 6 hours PRN Shortness of Breath and/or Wheezing  morphine  - Injectable 2 milliGRAM(s) IV Push every 2 hours PRN Pain or Respiratory distress      PHYSICAL EXAM:  Vital Signs Last 24 Hrs  T(C): 36.5 (06 Nov 2020 12:00), Max: 36.8 (06 Nov 2020 00:00)  T(F): 97.7 (06 Nov 2020 12:00), Max: 98.3 (06 Nov 2020 00:00)  HR: 74 (06 Nov 2020 12:00) (70 - 78)  BP: 98/53 (06 Nov 2020 12:00) (92/54 - 110/54)  BP(mean): --  RR: 16 (06 Nov 2020 12:00) (15 - 18)  SpO2: 100% (06 Nov 2020 12:00) (96% - 100%)  General: alert to stimuli, but very lethargic, mumbles few words, not in any distress currently      HEENT: n/c, a/t , + temporal wasting, oral mucosa w/ dried secretions , on bi-pap     Lungs: coarse , dim to bases    CV: normal  rate  GI: flat, soft, n/t , bs     : normal/durand    Musculoskeletal: sl contracted, weakened x 4    Skin: dry, fragile, mult senile purpura w/ skin tears     Neuro: mostly lethargic, mostly non verbal    Oral intake ability:  oral feeding- minimal  Diet: as param / NPO w/ bi-pap     LABS:                          8.0    9.11  )-----------( 176      ( 06 Nov 2020 05:30 )             26.7     11-06    149<H>  |  109<H>  |  24<H>  ----------------------------<  43<LL>  3.2<L>   |  28  |  1.61<H>    Ca    8.6      06 Nov 2020 05:30  Phos  3.3     11-05  Mg     2.1     11-05          RADIOLOGY & ADDITIONAL STUDIES: < from: Xray Chest 1 View- PORTABLE-Routine (Xray Chest 1 View- PORTABLE-Routine in AM.) (11.05.20 @ 06:50) >    EXAM:  XR CHEST PORTABLE ROUTINE 1V      PROCEDURE DATE:  11/05/2020        INTERPRETATION:  CLINICAL INDICATION: 80 years  Male with eval chf.    COMPARISON: 11/4/2020    The right hemithorax is partially omitted as is the left lung base. The patient is markedly rotated to the right. The lung apices are also partially omitted.    AP view of the chest demonstrates a left pleural effusion layering posteriorly. Cannot exclude underlying infiltrate or atelectasis. The visualized right lung is clear.    The heart is enlarged. Mediastinum and samm cannot be assessed. A stent graft is reidentified in the descending thoracic aorta.    The pulmonary vasculature is normal. There is no visualized pneumothorax.        IMPRESSION:    Limited as above.    Left pleural effusion layering posteriorly. Cannot exclude underlying infiltrate or atelectasis.    Cardiomegaly. Descending thoracic aortic stent graft.          ADVANCE DIRECTIVES:  molst Dnr/Dni , comfort care , bi-pap if pt tolerates   Advanced Care Planning discussion total time spent:

## 2020-11-06 NOTE — PROVIDER CONTACT NOTE (CRITICAL VALUE NOTIFICATION) - ASSESSMENT
ACCU CHECK DONE BLOOD SUGAR 50 MG/DL, JUANITA CHRISTINE MADE AWARE
alert /oriented x1 ,base line same
no distress VSS

## 2020-11-06 NOTE — PROGRESS NOTE ADULT - SUBJECTIVE AND OBJECTIVE BOX
CC: f/u for sustained E faecalis bacteremia    Patient calm, no distress, quite debilitated, slow to arouse, mostly nonverbal    REVIEW OF SYSTEMS:  Not provided    Antimicrobials Day # 10  ampicillin  IVPB 2 Gram(s) IV Intermittent every 6 hours    Other Medications Reviewed    T(F): 97.6 (11-06-20 @ 06:00), Max: 98.3 (11-06-20 @ 00:00)  HR: 72 (11-06-20 @ 09:13)  BP: 110/54 (11-06-20 @ 08:00)  RR: 16 (11-06-20 @ 08:00)  SpO2: 100% (11-06-20 @ 09:13)  Wt(kg): --    PHYSICAL EXAM:  General: no acute distress  Eyes:  anicteric, no conjunctival injection, no discharge  Oropharynx: dried blood lips 	  Neck: without adenopathy  Lungs: shallow effort, diminished bases, R pigtail  Heart: distant tones, regular rate and rhythm; no murmur, rubs or gallops  Abdomen: soft, nondistended, nontender, without mass or organomegaly  Skin: friable skin, ecchymosis UEs  Extremities: no edema  Neurologic: arouses, mostly nonverbal, poorly interactive    LAB RESULTS:                        8.0    9.11  )-----------( 176      ( 06 Nov 2020 05:30 )             26.7     11-06    149<H>  |  109<H>  |  24<H>  ----------------------------<  43<LL>  3.2<L>   |  28  |  1.61<H>    Ca    8.6      06 Nov 2020 05:30  Phos  3.3     11-05  Mg     2.1     11-05      MICROBIOLOGY:  RECENT CULTURES:  11-02 @ 15:40 .Blood Blood     No growth to date.    11-02 @ 15:00 .Blood Blood     Growth in anaerobic bottle: Enterococcus faecalis      RADIOLOGY REVIEWED

## 2020-11-06 NOTE — PROGRESS NOTE ADULT - REASON FOR ADMISSION
SOB/Hypoxia/Right lung white out/mucous plug

## 2020-11-06 NOTE — DISCHARGE NOTE FOR THE EXPIRED PATIENT - HOSPITAL COURSE
80M admitted to ICU initially with hypoxemic respiratory failure secondary to combination of large right sided pleural effusion as well as significant mucus plug, with likely underlying PNA. Patient required HFNC with high FIO2, he was transferred out of ICU once stabilized. However recently he was noted to have an episode of severe hypoxia, required replacement on NIPPV, and was transferred back to ICU. Day team had GOC discussion w/ daughter pt was made DNR/DNI and comfort measures were order with acception to continue IVF and IVAB. Tonight pt started to desaturated on bipap dropping his o2 into the 60s, bipap adjustment made w/ increase in EPAP and IPAP at 100% fio2 w/ minimal improvement, CXR was ordered to r/o PTX but by the time xray tech arrived patient had already passed. Emergency contact daughter was contacted prior to patient expirations to discuss and confirm MOLST forms and agreed to no further escalation of care given poor prognosis she requested I keep him pain free and comfortable and was given 0.5mg of IVP morphine. Pt  at 11:29pm. Daughter Kaur called and notify of expiration 80M admitted to ICU initially with hypoxemic respiratory failure secondary to combination of large right sided pleural effusion as well as significant mucus plug, with likely underlying PNA. Patient required HFNC with high FIO2, he was transferred out of ICU once stabilized. However recently he was noted to have an episode of severe hypoxia, required replacement on NIPPV, and was transferred back to ICU. Day team had GOC discussion w/ daughter pt was made DNR/DNI and comfort measures were order with acception to continue IVF and IVAB. on 20 patient started to desaturated on bipap dropping his o2 into the 60s, bipap adjustment made w/ increase in EPAP and IPAP at 100% fio2 w/ minimal improvement and      Emergency contact daughter was contacted prior to patient expirations to discuss and confirm MOLST forms and agreed to no further escalation of care given poor prognosis she requested I keep him pain free and comfortable and was given 0.5mg of IVP morphine. Pt  at 11:29pm. Daughter Kaur called and notify of expiration     For full account of hospital course please refer to actual medical records. As this is a brief summery. 80M admitted to ICU initially with hypoxemic respiratory failure secondary to combination of large right sided pleural effusion as well as significant mucus plug, with likely underlying PNA. Patient required HFNC with high FIO2, He received a pigtail right chest tube found to have transudative fluid suggestive that fluid is from CHF, His hypoxia improved and  he was transferred out of ICU once stabilized. However recently he was noted to have an episode of severe hypoxia, required replacement on NIPPV, and was transferred back to ICU. Day team had GOC discussion w/ daughter pt was made DNR/DNI and comfort measures were order with acception to continue IVF and IVAB. on 20 patient started to desaturated on bipap dropping his o2 into the 60s, bipap adjustment made w/ increase in EPAP and IPAP at 100% fio2 w/ minimal improvement and      Emergency contact daughter was contacted prior to patient expirations to discuss and confirm MOLST forms and agreed to no further escalation of care given poor prognosis she requested I keep him pain free and comfortable and was given 0.5mg of IVP morphine. Pt  at 11:29pm. Daughter Kaur called and notify of expiration     For full account of hospital course please refer to actual medical records. As this is a brief summery. 80M admitted to ICU initially with hypoxemic respiratory failure secondary to combination of large right sided pleural effusion as well as significant mucus plug, with likely underlying PNA. Patient required HFNC with high FIO2, He received a pigtail right chest tube found to have transudative fluid suggestive that fluid is from decompensated acute  on chronic diastolic chf, His hypoxia improved and  he was transferred out of ICU once stabilized. However recently he was noted to have an episode of severe hypoxia, required replacement on NIPPV, and was transferred back to ICU. Day team had GOC discussion w/ daughter pt was made DNR/DNI and comfort measures were order with acception to continue IVF and IVAB. on 20 patient started to desaturated on bipap dropping his o2 into the 60s, bipap adjustment made w/ increase in EPAP and IPAP at 100% fio2 w/ minimal improvement and      Emergency contact daughter was contacted prior to patient expirations to discuss and confirm MOLST forms and agreed to no further escalation of care given poor prognosis she requested I keep him pain free and comfortable and was given 0.5mg of IVP morphine. Pt  at 11:29pm. Daughter Kaur called and notify of expiration     For full account of hospital course please refer to actual medical records. As this is a brief summery.

## 2020-11-06 NOTE — PROGRESS NOTE ADULT - ASSESSMENT
A/P 80y male with a PMH of HTN, COPD, CAD, hypothyroidism, ascending aneurysm, BPH, s/p stent graft to repair AAA in late August /early September at [facility]   His Chest CT scan in September showed B/L effusions, RT>Lt, RUL atelectasis, endobronchial debris, and aortic graft stent with endoleak. S/p thoracentesis with fluid transudative.   Today weaker, more lethargic, and not eating. Currently using bi-pap, resting quietly, no distress     Plan per CCU:  #Acute hypoxic respiratory failure w/ large right plural effusion and mucous plug/atelectasis  -Patient currently on BiPAP  -will titrate IPAP and EPAP to maintain pH >7.30 and SaO2 >92%  -alarms reviewed  -NPO while on BiPAP   -continue duonebs and budesonide for COPD  -S/P pigtail CT for effusion which is now removed.  -diurese with lasix daily while monitoring lytes.   -continue with aggressive chest PT, pulmonary toilet, bed percussion, and IS use    #Enterococcal Bactermia:  -noted on blood cultures from 10/27/2020  -currently on  amipicillin, will need to compelte course, ID following- notes appreciated     #Anemia   -patient Hemoglobin is 8.3 again today   -no signs of bleeding, will trend Hb daily with AM labs, transfuse if Hb <7    #hypothyroid   - Patient received synthroid 100mcg, c/w current dosage     #BPH  - Patient on flomax 0.4mg twice daily. Will continue to prevent retention     #Dvt prophylaxis   -on full dose loveonx for elevated D-dimer and left atrial mass seen on ECHO.    #GI prophylaxis   - Protonix 40mg IVP once daily       -     NIV PRN alternating with  N/C  - NEW MOLST filled for comfort care  - no plans for thoracentesis today  - morphine prn  - On abx and a/c for  LA mobile mass as per ID      Palliative : f/u case reviewed w/ ccu team this Am and pt seen/examined  bedside. ID notes appreciated. Pt much more lethargic , confused ,and weaker today, refusing to eat. However, currently  in no distress.   Case again d/w dr Ni who just had lengthy talk w/ pts daughter today, per ccu team, daughter now wishes full comfort care  measures , w/ pain meds/MS if needed,  new molst outlining current wishes/goc was completed. Bi-Pap to be used only if pt tolerates. Pt currently looks calm/not in distress. Currently using bi-pap. Not requiring symptom management meds at this time.  Can consider offering hospice services if pt should start to require symptom management and IF not too fragile to transfer. Will cont to monitor/ follow pt w/ ccu team. Comfort Care measures in place.

## 2020-11-06 NOTE — PROVIDER CONTACT NOTE (CRITICAL VALUE NOTIFICATION) - ACTION/TREATMENT ORDERED:
Aware - no orders at this time - on Zithromax and rocephin
Aware no orders at this time - patient on Zithromax and rocephin
D50% 25 GM IV PUSH X1
T&S done and will transfuse one unit PRBC
none
ID consult

## 2020-11-06 NOTE — PROGRESS NOTE ADULT - ASSESSMENT
80y male with HTN, COPD, CAD, hypothyroidism, ascending aneurysm, BPH, s/p stent graft repair AAA in late August /early September at [facility]   Chest CT scan in September showed B/L effusions, endobronchial debris, and aortic graft stent with endoleak  Admitted on 10/24 with SOB & started CTX and azithro   Sustained E faecalis bacteremia 10/27- 11/2- concern of endocarditis as well as graft infection  Sustained bacteremia suggestive of endovascular focus- and in view of known endoleak, infected stent graft most likely  TTE without evidence of endocarditis; clot in LA  CTA of abd & pelvis revealed stable appearance of descending thoracoabdominal aortic stent graft. No evidence of perigraft collection or gas to suggest graft infection. 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   Pleural fluid is not infected, appears to be in heart failure  S/P pigtail on rt side, may have left side drained  Afebrile, WBC normal, but quite debilitated, and given inability to control bacteremia, along with rising Creat, prognosis quite guarded    Plan:  Continue Amp; restart Ceftriaxone for "synergy"  Repeat Bld Cxs in AM  Doubt he would be viewed as a surgical candidate, brendan for infected endostent   Not sure we can offer much beyond medical management  Prognosis guarded as above

## 2020-11-07 LAB
CULTURE RESULTS: SIGNIFICANT CHANGE UP
SPECIMEN SOURCE: SIGNIFICANT CHANGE UP

## 2020-11-25 LAB
CULTURE RESULTS: SIGNIFICANT CHANGE UP
SPECIMEN SOURCE: SIGNIFICANT CHANGE UP

## 2020-12-28 PROCEDURE — 82962 GLUCOSE BLOOD TEST: CPT

## 2020-12-28 PROCEDURE — 97162 PT EVAL MOD COMPLEX 30 MIN: CPT

## 2020-12-28 PROCEDURE — 83605 ASSAY OF LACTIC ACID: CPT

## 2020-12-28 PROCEDURE — 97530 THERAPEUTIC ACTIVITIES: CPT

## 2020-12-28 PROCEDURE — 93005 ELECTROCARDIOGRAM TRACING: CPT

## 2020-12-28 PROCEDURE — 89051 BODY FLUID CELL COUNT: CPT

## 2020-12-28 PROCEDURE — 94640 AIRWAY INHALATION TREATMENT: CPT

## 2020-12-28 PROCEDURE — 85027 COMPLETE CBC AUTOMATED: CPT

## 2020-12-28 PROCEDURE — 86769 SARS-COV-2 COVID-19 ANTIBODY: CPT

## 2020-12-28 PROCEDURE — 80053 COMPREHEN METABOLIC PANEL: CPT

## 2020-12-28 PROCEDURE — 81001 URINALYSIS AUTO W/SCOPE: CPT

## 2020-12-28 PROCEDURE — 87070 CULTURE OTHR SPECIMN AEROBIC: CPT

## 2020-12-28 PROCEDURE — 85610 PROTHROMBIN TIME: CPT

## 2020-12-28 PROCEDURE — 82803 BLOOD GASES ANY COMBINATION: CPT

## 2020-12-28 PROCEDURE — 74174 CTA ABD&PLVS W/CONTRAST: CPT

## 2020-12-28 PROCEDURE — 84484 ASSAY OF TROPONIN QUANT: CPT

## 2020-12-28 PROCEDURE — 96365 THER/PROPH/DIAG IV INF INIT: CPT

## 2020-12-28 PROCEDURE — 84145 PROCALCITONIN (PCT): CPT

## 2020-12-28 PROCEDURE — 83615 LACTATE (LD) (LDH) ENZYME: CPT

## 2020-12-28 PROCEDURE — 85730 THROMBOPLASTIN TIME PARTIAL: CPT

## 2020-12-28 PROCEDURE — 87150 DNA/RNA AMPLIFIED PROBE: CPT

## 2020-12-28 PROCEDURE — 71250 CT THORAX DX C-: CPT

## 2020-12-28 PROCEDURE — 82550 ASSAY OF CK (CPK): CPT

## 2020-12-28 PROCEDURE — 80048 BASIC METABOLIC PNL TOTAL CA: CPT

## 2020-12-28 PROCEDURE — 99291 CRITICAL CARE FIRST HOUR: CPT | Mod: 25

## 2020-12-28 PROCEDURE — 86850 RBC ANTIBODY SCREEN: CPT

## 2020-12-28 PROCEDURE — 96367 TX/PROPH/DG ADDL SEQ IV INF: CPT

## 2020-12-28 PROCEDURE — 84100 ASSAY OF PHOSPHORUS: CPT

## 2020-12-28 PROCEDURE — 85379 FIBRIN DEGRADATION QUANT: CPT

## 2020-12-28 PROCEDURE — 87102 FUNGUS ISOLATION CULTURE: CPT

## 2020-12-28 PROCEDURE — 82042 OTHER SOURCE ALBUMIN QUAN EA: CPT

## 2020-12-28 PROCEDURE — P9016: CPT

## 2020-12-28 PROCEDURE — 83036 HEMOGLOBIN GLYCOSYLATED A1C: CPT

## 2020-12-28 PROCEDURE — 94667 MNPJ CHEST WALL 1ST: CPT

## 2020-12-28 PROCEDURE — 87635 SARS-COV-2 COVID-19 AMP PRB: CPT

## 2020-12-28 PROCEDURE — 87449 NOS EACH ORGANISM AG IA: CPT

## 2020-12-28 PROCEDURE — 85025 COMPLETE CBC W/AUTO DIFF WBC: CPT

## 2020-12-28 PROCEDURE — 87075 CULTR BACTERIA EXCEPT BLOOD: CPT

## 2020-12-28 PROCEDURE — P9047: CPT

## 2020-12-28 PROCEDURE — 84157 ASSAY OF PROTEIN OTHER: CPT

## 2020-12-28 PROCEDURE — 88112 CYTOPATH CELL ENHANCE TECH: CPT

## 2020-12-28 PROCEDURE — 83880 ASSAY OF NATRIURETIC PEPTIDE: CPT

## 2020-12-28 PROCEDURE — 87040 BLOOD CULTURE FOR BACTERIA: CPT

## 2020-12-28 PROCEDURE — 94668 MNPJ CHEST WALL SBSQ: CPT

## 2020-12-28 PROCEDURE — 82945 GLUCOSE OTHER FLUID: CPT

## 2020-12-28 PROCEDURE — 86901 BLOOD TYPING SEROLOGIC RH(D): CPT

## 2020-12-28 PROCEDURE — 94660 CPAP INITIATION&MGMT: CPT

## 2020-12-28 PROCEDURE — 36600 WITHDRAWAL OF ARTERIAL BLOOD: CPT

## 2020-12-28 PROCEDURE — 86920 COMPATIBILITY TEST SPIN: CPT

## 2020-12-28 PROCEDURE — 88305 TISSUE EXAM BY PATHOLOGIST: CPT

## 2020-12-28 PROCEDURE — 87186 SC STD MICRODIL/AGAR DIL: CPT

## 2020-12-28 PROCEDURE — 71045 X-RAY EXAM CHEST 1 VIEW: CPT

## 2020-12-28 PROCEDURE — 86900 BLOOD TYPING SEROLOGIC ABO: CPT

## 2020-12-28 PROCEDURE — 71275 CT ANGIOGRAPHY CHEST: CPT

## 2020-12-28 PROCEDURE — 87205 SMEAR GRAM STAIN: CPT

## 2020-12-28 PROCEDURE — 83986 ASSAY PH BODY FLUID NOS: CPT

## 2020-12-28 PROCEDURE — 82553 CREATINE MB FRACTION: CPT

## 2020-12-28 PROCEDURE — 87086 URINE CULTURE/COLONY COUNT: CPT

## 2020-12-28 PROCEDURE — 93306 TTE W/DOPPLER COMPLETE: CPT

## 2020-12-28 PROCEDURE — 36430 TRANSFUSION BLD/BLD COMPNT: CPT

## 2020-12-28 PROCEDURE — 83735 ASSAY OF MAGNESIUM: CPT

## 2020-12-28 PROCEDURE — 36415 COLL VENOUS BLD VENIPUNCTURE: CPT

## 2022-10-08 NOTE — DIETITIAN INITIAL EVALUATION ADULT. - OTHER INFO
81 y/o male, recent aortic aneurysm repair 1 month ago ([facility] Dr [facility]), HTN, Emphysema, BPH, currently being tx for UTI/pneumonia  (on zosyn and Macrobid, last dose today of macrobid), hypothyroidism, comes from [place] [facility] c/o hemoptysis admitted found to have b/l pleural effusions. H&P: 79 y/o male, recent aortic aneurysm repair 1 month ago ([facility] Dr [place]), HTN, Emphysema, BPH, currently being tx for UTI/pneumonia , hypothyroidism, [facility] admitted found to have b/l pleural effusions.  Pt reports good appetite. PO intake <50% because he does not like the foods he has had recently. Pt does not eat fish, fat free milk, cheese or cheese cake.  Food preference taken in detailed. Pt feed self, no chewing/swallowing difficulties. NKFA. Denies N/V/C/D. Last BM: 09/20. PTA pt consumed a regular diet, pt states he cooks at home breakfast, lunch and dinner. Pt observed with muscle and fat wasting in temporalis, interosseous, clavicles, biceps severe, Buccal area and under eyes mild fat loss. Pt reports UBW: 140 Lbs. Current weight: 136.7 Lbs. BMI: <19. Pt has 76% of IBW. Pt reports last time he was in the hospital consumed regular foods and gained 4 Lbs. Suspect low weight is due to low PO intake at home. Pt reports dislike commercial supplements. Will provide Magic cup, fruit cup for snack at 2pm. No edema. Skin WDL except ecchymotic. Opt out

## 2024-06-11 NOTE — PROVIDER CONTACT NOTE (CRITICAL VALUE NOTIFICATION) - DATE AND TIME:
03-Nov-2020 06:30 -Downgraded from NSCU   -EVD/hydro watch   -NSGY following through day 7 (6/12) for exams   -Please obtain CTH and call neurosurgery once done, prior to d/c  03-Nov-2020 06:31

## 2024-08-10 NOTE — PROCEDURE NOTE - NSASSISTBY_GEN_A_CORE
Patient is started on IV Levaquin and IV Flagyl  Use dexamethasone 12 mg daily in place of budesonide 6 mg while in-house  UC flare suspected-GI consulted       Myself

## 2025-06-09 NOTE — PHARMACOTHERAPY INTERVENTION NOTE - INTERVENTION TYPE MED REC
Med Rec - Admission

## 2025-06-19 NOTE — ED PROVIDER NOTE - EKG #1 DATE/TIME
[FreeTextEntry1] : % pred, FEV1 113% pred, FEV1/FVC 89%, FEF 25-75 91% pred
[FreeTextEntry1] : % pred, FEV1 113% pred, FEV1/FVC 89%, FEF 25-75 91% pred
18-Sep-2020 12:13